# Patient Record
Sex: FEMALE | Race: BLACK OR AFRICAN AMERICAN | NOT HISPANIC OR LATINO | Employment: OTHER | URBAN - METROPOLITAN AREA
[De-identification: names, ages, dates, MRNs, and addresses within clinical notes are randomized per-mention and may not be internally consistent; named-entity substitution may affect disease eponyms.]

---

## 2020-03-28 ENCOUNTER — HOSPITAL ENCOUNTER (INPATIENT)
Facility: HOSPITAL | Age: 85
LOS: 2 days | Discharge: HOME | DRG: 189 | End: 2020-03-31
Attending: EMERGENCY MEDICINE | Admitting: EMERGENCY MEDICINE
Payer: MEDICARE

## 2020-03-28 DIAGNOSIS — R79.89 ELEVATED TROPONIN: ICD-10-CM

## 2020-03-28 DIAGNOSIS — R06.02 SHORTNESS OF BREATH: ICD-10-CM

## 2020-03-28 DIAGNOSIS — N17.9 AKI (ACUTE KIDNEY INJURY): ICD-10-CM

## 2020-03-28 DIAGNOSIS — J45.901 EXACERBATION OF ASTHMA, UNSPECIFIED ASTHMA SEVERITY, UNSPECIFIED WHETHER PERSISTENT: Primary | ICD-10-CM

## 2020-03-28 PROBLEM — I10 ESSENTIAL HYPERTENSION: Chronic | Status: ACTIVE | Noted: 2020-03-28

## 2020-03-28 PROBLEM — J96.01 ACUTE RESPIRATORY FAILURE WITH HYPOXIA: Status: ACTIVE | Noted: 2020-03-28

## 2020-03-28 LAB
ALBUMIN SERPL BCP-MCNC: 3.8 G/DL (ref 3.5–5.2)
ALP SERPL-CCNC: 59 U/L (ref 55–135)
ALT SERPL W/O P-5'-P-CCNC: 20 U/L (ref 10–44)
ANION GAP SERPL CALC-SCNC: 13 MMOL/L (ref 8–16)
AST SERPL-CCNC: 23 U/L (ref 10–40)
BACTERIA #/AREA URNS HPF: ABNORMAL /HPF
BASOPHILS # BLD AUTO: 0.03 K/UL (ref 0–0.2)
BASOPHILS NFR BLD: 0.2 % (ref 0–1.9)
BILIRUB SERPL-MCNC: 0.2 MG/DL (ref 0.1–1)
BILIRUB UR QL STRIP: NEGATIVE
BNP SERPL-MCNC: 36 PG/ML (ref 0–99)
BUN SERPL-MCNC: 25 MG/DL (ref 8–23)
CALCIUM SERPL-MCNC: 9.2 MG/DL (ref 8.7–10.5)
CHLORIDE SERPL-SCNC: 107 MMOL/L (ref 95–110)
CLARITY UR: CLEAR
CO2 SERPL-SCNC: 23 MMOL/L (ref 23–29)
COLOR UR: YELLOW
CREAT SERPL-MCNC: 1.6 MG/DL (ref 0.5–1.4)
CRP SERPL-MCNC: 40.3 MG/L (ref 0–8.2)
DIFFERENTIAL METHOD: ABNORMAL
EOSINOPHIL # BLD AUTO: 0.1 K/UL (ref 0–0.5)
EOSINOPHIL NFR BLD: 1 % (ref 0–8)
ERYTHROCYTE [DISTWIDTH] IN BLOOD BY AUTOMATED COUNT: 14.3 % (ref 11.5–14.5)
EST. GFR  (AFRICAN AMERICAN): 34 ML/MIN/1.73 M^2
EST. GFR  (NON AFRICAN AMERICAN): 30 ML/MIN/1.73 M^2
GLUCOSE SERPL-MCNC: 112 MG/DL (ref 70–110)
GLUCOSE UR QL STRIP: NEGATIVE
HCT VFR BLD AUTO: 35.1 % (ref 37–48.5)
HGB BLD-MCNC: 10.4 G/DL (ref 12–16)
HGB UR QL STRIP: ABNORMAL
HYALINE CASTS #/AREA URNS LPF: 15 /LPF
IMM GRANULOCYTES # BLD AUTO: 0.09 K/UL (ref 0–0.04)
IMM GRANULOCYTES NFR BLD AUTO: 0.7 % (ref 0–0.5)
INR PPP: 0.9 (ref 0.8–1.2)
KETONES UR QL STRIP: NEGATIVE
LEUKOCYTE ESTERASE UR QL STRIP: NEGATIVE
LYMPHOCYTES # BLD AUTO: 0.8 K/UL (ref 1–4.8)
LYMPHOCYTES NFR BLD: 6 % (ref 18–48)
MCH RBC QN AUTO: 27.1 PG (ref 27–31)
MCHC RBC AUTO-ENTMCNC: 29.6 G/DL (ref 32–36)
MCV RBC AUTO: 91 FL (ref 82–98)
MICROSCOPIC COMMENT: ABNORMAL
MONOCYTES # BLD AUTO: 0.8 K/UL (ref 0.3–1)
MONOCYTES NFR BLD: 5.6 % (ref 4–15)
NEUTROPHILS # BLD AUTO: 11.9 K/UL (ref 1.8–7.7)
NEUTROPHILS NFR BLD: 86.5 % (ref 38–73)
NITRITE UR QL STRIP: NEGATIVE
NRBC BLD-RTO: 0 /100 WBC
PH UR STRIP: 5 [PH] (ref 5–8)
PLATELET # BLD AUTO: 286 K/UL (ref 150–350)
PMV BLD AUTO: 10 FL (ref 9.2–12.9)
POTASSIUM SERPL-SCNC: 4.5 MMOL/L (ref 3.5–5.1)
PROCALCITONIN SERPL IA-MCNC: 0.05 NG/ML
PROT SERPL-MCNC: 7.4 G/DL (ref 6–8.4)
PROT UR QL STRIP: ABNORMAL
PROTHROMBIN TIME: 10.2 SEC (ref 9–12.5)
RBC # BLD AUTO: 3.84 M/UL (ref 4–5.4)
RBC #/AREA URNS HPF: 1 /HPF (ref 0–4)
SODIUM SERPL-SCNC: 143 MMOL/L (ref 136–145)
SP GR UR STRIP: 1.01 (ref 1–1.03)
TROPONIN I SERPL DL<=0.01 NG/ML-MCNC: 0.06 NG/ML (ref 0–0.03)
URN SPEC COLLECT METH UR: ABNORMAL
UROBILINOGEN UR STRIP-ACNC: NEGATIVE EU/DL
WBC # BLD AUTO: 13.7 K/UL (ref 3.9–12.7)
WBC #/AREA URNS HPF: 5 /HPF (ref 0–5)

## 2020-03-28 PROCEDURE — 86140 C-REACTIVE PROTEIN: CPT

## 2020-03-28 PROCEDURE — 93010 ELECTROCARDIOGRAM REPORT: CPT | Mod: ,,, | Performed by: INTERNAL MEDICINE

## 2020-03-28 PROCEDURE — 63600175 PHARM REV CODE 636 W HCPCS: Performed by: EMERGENCY MEDICINE

## 2020-03-28 PROCEDURE — 82728 ASSAY OF FERRITIN: CPT

## 2020-03-28 PROCEDURE — 83615 LACTATE (LD) (LDH) ENZYME: CPT

## 2020-03-28 PROCEDURE — 85610 PROTHROMBIN TIME: CPT

## 2020-03-28 PROCEDURE — 83880 ASSAY OF NATRIURETIC PEPTIDE: CPT

## 2020-03-28 PROCEDURE — 99285 EMERGENCY DEPT VISIT HI MDM: CPT | Mod: 25

## 2020-03-28 PROCEDURE — 83605 ASSAY OF LACTIC ACID: CPT

## 2020-03-28 PROCEDURE — 96374 THER/PROPH/DIAG INJ IV PUSH: CPT

## 2020-03-28 PROCEDURE — 93005 ELECTROCARDIOGRAM TRACING: CPT

## 2020-03-28 PROCEDURE — 93010 EKG 12-LEAD: ICD-10-PCS | Mod: ,,, | Performed by: INTERNAL MEDICINE

## 2020-03-28 PROCEDURE — 80053 COMPREHEN METABOLIC PANEL: CPT

## 2020-03-28 PROCEDURE — 84145 PROCALCITONIN (PCT): CPT

## 2020-03-28 PROCEDURE — 84484 ASSAY OF TROPONIN QUANT: CPT

## 2020-03-28 PROCEDURE — 82550 ASSAY OF CK (CPK): CPT

## 2020-03-28 PROCEDURE — 85025 COMPLETE CBC W/AUTO DIFF WBC: CPT

## 2020-03-28 PROCEDURE — 85652 RBC SED RATE AUTOMATED: CPT

## 2020-03-28 PROCEDURE — 81000 URINALYSIS NONAUTO W/SCOPE: CPT

## 2020-03-28 RX ORDER — AMLODIPINE AND BENAZEPRIL HYDROCHLORIDE 10; 40 MG/1; MG/1
1 CAPSULE ORAL DAILY
COMMUNITY

## 2020-03-28 RX ORDER — HYDRALAZINE HYDROCHLORIDE 20 MG/ML
5 INJECTION INTRAMUSCULAR; INTRAVENOUS
Status: COMPLETED | OUTPATIENT
Start: 2020-03-28 | End: 2020-03-28

## 2020-03-28 RX ORDER — ATORVASTATIN CALCIUM 40 MG/1
40 TABLET, FILM COATED ORAL DAILY
COMMUNITY

## 2020-03-28 RX ADMIN — HYDRALAZINE HYDROCHLORIDE 5 MG: 20 INJECTION INTRAMUSCULAR; INTRAVENOUS at 10:03

## 2020-03-29 PROBLEM — J45.901 EXACERBATION OF ASTHMA: Status: ACTIVE | Noted: 2020-03-29

## 2020-03-29 LAB
CK SERPL-CCNC: 87 U/L (ref 20–180)
ERYTHROCYTE [SEDIMENTATION RATE] IN BLOOD BY WESTERGREN METHOD: 36 MM/HR (ref 0–20)
FERRITIN SERPL-MCNC: 22 NG/ML (ref 20–300)
LACTATE SERPL-SCNC: 0.7 MMOL/L (ref 0.5–2.2)
LDH SERPL L TO P-CCNC: 244 U/L (ref 110–260)
TROPONIN I SERPL DL<=0.01 NG/ML-MCNC: 0.02 NG/ML (ref 0–0.03)

## 2020-03-29 PROCEDURE — 84484 ASSAY OF TROPONIN QUANT: CPT

## 2020-03-29 PROCEDURE — 25000003 PHARM REV CODE 250: Performed by: EMERGENCY MEDICINE

## 2020-03-29 PROCEDURE — 63600175 PHARM REV CODE 636 W HCPCS: Performed by: HOSPITALIST

## 2020-03-29 PROCEDURE — U0002 COVID-19 LAB TEST NON-CDC: HCPCS

## 2020-03-29 PROCEDURE — 21400001 HC TELEMETRY ROOM

## 2020-03-29 PROCEDURE — 63600175 PHARM REV CODE 636 W HCPCS: Performed by: EMERGENCY MEDICINE

## 2020-03-29 PROCEDURE — 36415 COLL VENOUS BLD VENIPUNCTURE: CPT

## 2020-03-29 RX ORDER — SODIUM CHLORIDE 0.9 % (FLUSH) 0.9 %
10 SYRINGE (ML) INJECTION
Status: DISCONTINUED | OUTPATIENT
Start: 2020-03-29 | End: 2020-03-31 | Stop reason: HOSPADM

## 2020-03-29 RX ORDER — SODIUM CHLORIDE 9 MG/ML
INJECTION, SOLUTION INTRAVENOUS CONTINUOUS
Status: DISCONTINUED | OUTPATIENT
Start: 2020-03-29 | End: 2020-03-30

## 2020-03-29 RX ORDER — ONDANSETRON 2 MG/ML
4 INJECTION INTRAMUSCULAR; INTRAVENOUS EVERY 8 HOURS PRN
Status: DISCONTINUED | OUTPATIENT
Start: 2020-03-29 | End: 2020-03-31 | Stop reason: HOSPADM

## 2020-03-29 RX ORDER — ACETAMINOPHEN 325 MG/1
650 TABLET ORAL EVERY 8 HOURS PRN
Status: DISCONTINUED | OUTPATIENT
Start: 2020-03-29 | End: 2020-03-31 | Stop reason: HOSPADM

## 2020-03-29 RX ORDER — PANTOPRAZOLE SODIUM 40 MG/1
40 TABLET, DELAYED RELEASE ORAL DAILY
Status: DISCONTINUED | OUTPATIENT
Start: 2020-03-29 | End: 2020-03-31 | Stop reason: HOSPADM

## 2020-03-29 RX ORDER — PREDNISONE 20 MG/1
20 TABLET ORAL DAILY
Status: DISCONTINUED | OUTPATIENT
Start: 2020-03-29 | End: 2020-03-31 | Stop reason: HOSPADM

## 2020-03-29 RX ORDER — AZITHROMYCIN 250 MG/1
500 TABLET, FILM COATED ORAL DAILY
Status: DISCONTINUED | OUTPATIENT
Start: 2020-03-29 | End: 2020-03-31 | Stop reason: HOSPADM

## 2020-03-29 RX ORDER — HYDRALAZINE HYDROCHLORIDE 20 MG/ML
10 INJECTION INTRAMUSCULAR; INTRAVENOUS EVERY 6 HOURS PRN
Status: DISCONTINUED | OUTPATIENT
Start: 2020-03-29 | End: 2020-03-31 | Stop reason: HOSPADM

## 2020-03-29 RX ORDER — ALBUTEROL SULFATE 90 UG/1
2 AEROSOL, METERED RESPIRATORY (INHALATION) EVERY 4 HOURS PRN
Status: DISCONTINUED | OUTPATIENT
Start: 2020-03-29 | End: 2020-03-31 | Stop reason: HOSPADM

## 2020-03-29 RX ORDER — PREDNISONE 20 MG/1
20 TABLET ORAL
Status: COMPLETED | OUTPATIENT
Start: 2020-03-29 | End: 2020-03-29

## 2020-03-29 RX ADMIN — AZITHROMYCIN 500 MG: 250 TABLET, FILM COATED ORAL at 11:03

## 2020-03-29 RX ADMIN — HYDRALAZINE HYDROCHLORIDE 10 MG: 20 INJECTION INTRAMUSCULAR; INTRAVENOUS at 07:03

## 2020-03-29 RX ADMIN — CEFTRIAXONE 1 G: 1 INJECTION, POWDER, FOR SOLUTION INTRAMUSCULAR; INTRAVENOUS at 12:03

## 2020-03-29 RX ADMIN — PANTOPRAZOLE SODIUM 40 MG: 40 TABLET, DELAYED RELEASE ORAL at 11:03

## 2020-03-29 RX ADMIN — PREDNISONE 20 MG: 20 TABLET ORAL at 11:03

## 2020-03-29 RX ADMIN — SODIUM CHLORIDE: 0.9 INJECTION, SOLUTION INTRAVENOUS at 11:03

## 2020-03-29 RX ADMIN — PREDNISONE 20 MG: 20 TABLET ORAL at 12:03

## 2020-03-29 RX ADMIN — SODIUM CHLORIDE: 0.9 INJECTION, SOLUTION INTRAVENOUS at 12:03

## 2020-03-29 RX ADMIN — CEFTRIAXONE 1 G: 1 INJECTION, POWDER, FOR SOLUTION INTRAMUSCULAR; INTRAVENOUS at 11:03

## 2020-03-29 RX ADMIN — AZITHROMYCIN 500 MG: 250 TABLET, FILM COATED ORAL at 12:03

## 2020-03-29 RX ADMIN — ACETAMINOPHEN 650 MG: 325 TABLET ORAL at 10:03

## 2020-03-29 NOTE — HPI
85 y.o. female with asthma, HTN, and former smoker presents with a complaint of SOB.  Acute onset tonight, associated with wheezing and chest tightness.  Attempted self treatment with albuterol MDI without relief.  Similar occurrence 2/23/2020 for which she was hospitalized at [facility], no intubation, treated with prednisone.  Denies fever, chills, cough, chest pain, orthopnea, PND, dizziness, syncope, n/v/d, abd pain, bloody or black stools, dysuria, frequency, or urgency.  In the ED, she was found to be hypoxic on room air.  CXR without acute abnormality.  Labs reveal ROD, mildly elevated troponin, lymphopenia, and elevated CRP.  UA unremarkable.  procalcitonin normal.  Admitted to Hospital Medicine for further evaluation and treatment.

## 2020-03-29 NOTE — ASSESSMENT & PLAN NOTE
Greater than 50% drop in GFR, Cr doubled.  Continue IV fluids, strict I/O's, monitor renal function and electrolytes, avoid nephrotoxic agents.

## 2020-03-29 NOTE — ED NOTES
LOC: The patient is awake, alert and aware of environment with an appropriate affect, the patient is oriented x 3 and speaking appropriately.  APPEARANCE: Patient resting comfortably and in no acute distress, patient is clean and well groomed, patient's clothing is properly fastened.  SKIN: The skin is warm and dry, color consistent with ethnicity, patient has normal skin turgor and moist mucus membranes, skin intact, no breakdown or bruising noted to visible skin.  MUSCULOSKELETAL: Patient moving all extremities spontaneously, no obvious swelling or deformities noted.  RESPIRATORY: Airway is open and patent, respirations are spontaneous, patient has a mild increase effort and normal rate, no accessory muscle use noted, bilateral breath sounds deminished and occasional wheeze throughout to auscultation. Patient reports no current shortness of breath, no distress noted. On 2L NC  CARDIAC: Patient has a normal rate and regular rhythm, no periphreal edema noted, capillary refill < 3 seconds.  ABDOMEN: Soft and non tender to palpation, no distention noted, normoactive bowel sounds present in all four quadrants.  NEUROLOGIC: PERRL, 3 mm bilaterally, eyes open spontaneously, behavior appropriate to situation, follows commands, facial expression symmetrical, bilateral hand grasp equal and even, purposeful motor response noted, normal sensation in all extremities when touched with a finger.  PSCYH: Brief WNL

## 2020-03-29 NOTE — PLAN OF CARE
03/29/20 1616   Discharge Assessment   Assessment Type Discharge Planning Assessment   Confirmed/corrected address and phone number on facesheet? Yes   Assessment information obtained from? Patient   Communicated expected length of stay with patient/caregiver no   Prior to hospitilization cognitive status: Alert/Oriented   Prior to hospitalization functional status: Independent;Assistive Equipment;Needs Assistance  (Nebulizer; walker; friends assist and transport if needed)   Current cognitive status: Alert/Oriented   Current Functional Status: Independent;Assistive Equipment;Needs Assistance  (Nebulizer, walker; friends assist and transport)   Facility Arrived From: Home   Lives With friend(s)   Able to Return to Prior Arrangements yes   Is patient able to care for self after discharge? Yes   Who are your caregiver(s) and their phone number(s)? Iris-friend: [phone]   Patient's perception of discharge disposition home or selfcare   Readmission Within the Last 30 Days no previous admission in last 30 days   Patient currently being followed by outpatient case management? No   Patient currently receives any other outside agency services? No   Equipment Currently Used at Home nebulizer;walker, rolling   Do you have any problems affording any of your prescribed medications? TBD  (No insurance listed)   Is the patient taking medications as prescribed? yes   Does the patient have transportation home? Yes   Transportation Anticipated family or friend will provide   Does the patient receive services at the [facility]? No   Discharge Plan A Home with family   Discharge Plan B Other  (TBD)   DME Needed Upon Discharge  other (see comments)  (TBD)   Patient/Family in Agreement with Plan yes   SW Role explained to patient; two patient identifiers recognized; SW contact information placed on Communication board. Discussed patient managing health care at home; determined who would be helping patient at home with recovery:  Lives with friend who helps; friends transport    PCP: [facility]    Extended Emergency Contact Information  Primary Emergency Contact: Misty Melvin  Address: [address] [place] [id number] [facility]  Home Phone: [phone]  Relation: Friend     Pam [address] GARCIA    Payor: /  None listed

## 2020-03-29 NOTE — ED NOTES
Patient resting, eyes closed. Appears alseep and resting comfortable. No distress. Vitals remained stable and uneventful overnight after improvemetn of BP with hydralizine eariler in shift.

## 2020-03-29 NOTE — ED NOTES
The patient is resting quietly, eyes closed, arouses easily to stimuli. Updated on plan of care. Medications as ordered. No complaints at this time.  Airway is open and patent, respirations are spontaneous, normal respiratory effort and rate noted, skin warm and dry, appearance: in no acute distress and resting comfortably.

## 2020-03-29 NOTE — ED PROVIDER NOTES
Encounter Date: 3/28/2020    SCRIBE #1 NOTE: I, Herman Miller, am scribing for, and in the presence of,  Jevon Conner MD. I have scribed the following portions of the note - Other sections scribed: HPI, ROS, PE.       History     Chief Complaint   Patient presents with    Shortness of Breath     pt c/o SOB that started all of a sudden tonight; hx of asthma; states this feels like an asthma attack; denies cough or fever    Hypoxia     pt is 88% on RA; received 1 breathing tx with EMS     This is a 85 y.o. female who presents to the ED via EMS with c/o sudden onset SOB that began earlier this evening. Symptoms are constant and severe in severity. The pt notes the current symptoms are consistent with her usual Asthma symptoms. She used her Albuterol pump at home with no relief. Upon EMS arrival the pt was given a duoneb treatment with noticeable improvement. No mitigating or exacerbating factors reported. There are no associated symptoms. Patient denies any known sick contact. Denies any fever, cough, CP, myalgias, and all other sxs at this time.    PMHx of Asthma.      The history is provided by the patient and medical records.     Review of patient's allergies indicates:  No Known Allergies  Past Medical History:   Diagnosis Date    Asthma     Hypertension      Past Surgical History:   Procedure Laterality Date    HYSTERECTOMY       Family History   Problem Relation Age of Onset    Hypertension Mother     Stroke Father      Social History     Tobacco Use    Smoking status: Former Smoker     Types: Cigarettes     Last attempt to quit: 3/28/1995     Years since quittin.0    Smokeless tobacco: Never Used   Substance Use Topics    Alcohol use: Yes     Frequency: Never     Comment: occasional/social    Drug use: Never     Review of Systems   Constitutional: Negative for chills, diaphoresis and fever.   HENT: Negative for congestion and sore throat.    Eyes: Negative for visual disturbance.   Respiratory:  Positive for shortness of breath. Negative for cough.    Cardiovascular: Negative for chest pain.   Gastrointestinal: Negative for abdominal pain, blood in stool, diarrhea, nausea and vomiting.        No melena.   Genitourinary: Negative for dysuria, flank pain and hematuria.   Skin: Negative for rash and wound.   Neurological: Negative for dizziness, speech difficulty, weakness, numbness and headaches.   Psychiatric/Behavioral: Negative for confusion.       Physical Exam     Initial Vitals [03/28/20 2023]   BP Pulse Resp Temp SpO2   (!) 186/104 106 (!) 30 98.4 °F (36.9 °C) (S) (!) 88 %      MAP       --         Physical Exam    Nursing note and vitals reviewed.  Constitutional: She is not diaphoretic. No distress.   HENT:   Head: Normocephalic and atraumatic.   Mouth/Throat: Oropharynx is clear and moist.   Eyes: EOM are normal. Pupils are equal, round, and reactive to light. No scleral icterus.   Neck: Normal range of motion. Neck supple. No JVD present.   Cardiovascular: Normal rate, regular rhythm, normal heart sounds and intact distal pulses.   Pulmonary/Chest: Breath sounds normal. No stridor. No respiratory distress. She has no wheezes. She has no rhonchi. She has no rales. She exhibits no tenderness.   Pt is speaking in full sentences.   Abdominal: Soft. Bowel sounds are normal. She exhibits no distension. There is no tenderness.   Musculoskeletal: Normal range of motion. She exhibits no edema or tenderness.   Neurological: She is alert. She has normal strength. No cranial nerve deficit or sensory deficit. GCS score is 15. GCS eye subscore is 4. GCS verbal subscore is 5. GCS motor subscore is 6.   Skin: Skin is warm and dry.   Psychiatric: She has a normal mood and affect.         ED Course   Procedures  Labs Reviewed   CBC W/ AUTO DIFFERENTIAL - Abnormal; Notable for the following components:       Result Value    WBC 13.70 (*)     RBC 3.84 (*)     Hemoglobin 10.4 (*)     Hematocrit 35.1 (*)     Mean  Corpuscular Hemoglobin Conc 29.6 (*)     Immature Granulocytes 0.7 (*)     Gran # (ANC) 11.9 (*)     Immature Grans (Abs) 0.09 (*)     Lymph # 0.8 (*)     Gran% 86.5 (*)     Lymph% 6.0 (*)     All other components within normal limits   COMPREHENSIVE METABOLIC PANEL - Abnormal; Notable for the following components:    Glucose 112 (*)     BUN, Bld 25 (*)     Creatinine 1.6 (*)     eGFR if  34 (*)     eGFR if non  30 (*)     All other components within normal limits   TROPONIN I - Abnormal; Notable for the following components:    Troponin I 0.058 (*)     All other components within normal limits   C-REACTIVE PROTEIN - Abnormal; Notable for the following components:    CRP 40.3 (*)     All other components within normal limits   URINALYSIS, REFLEX TO URINE CULTURE - Abnormal; Notable for the following components:    Protein, UA 1+ (*)     Occult Blood UA 1+ (*)     All other components within normal limits    Narrative:     Preferred Collection Type->Urine, Clean Catch   URINALYSIS MICROSCOPIC - Abnormal; Notable for the following components:    Hyaline Casts, UA 15 (*)     All other components within normal limits    Narrative:     Preferred Collection Type->Urine, Clean Catch   SEDIMENTATION RATE - Abnormal; Notable for the following components:    Sed Rate 36 (*)     All other components within normal limits    Narrative:     Suspect COVID 19   B-TYPE NATRIURETIC PEPTIDE   PROTIME-INR   PROCALCITONIN   FERRITIN    Narrative:     Suspect COVID 19   LACTATE DEHYDROGENASE    Narrative:     Suspect COVID 19   CK    Narrative:     Suspect COVID 19   LACTIC ACID, PLASMA    Narrative:     Suspect COVID 19     EKG Readings: (Independently Interpreted)   Initial Reading: No STEMI. Rhythm: Normal Sinus Rhythm. Heart Rate: 88 BPM. Ectopy: No Ectopy. Conduction: Normal. ST Segments: Normal ST Segments. T Waves: Normal. Clinical Impression: Normal Sinus Rhythm       Imaging Results          X-Ray  Chest AP Portable (Final result)  Result time 03/28/20 21:49:02    Final result by Mark Bustillo MD (03/28/20 21:49:02)                 Impression:      No acute cardiopulmonary process identified.      Electronically signed by: Mark Bustillo MD  Date:    03/28/2020  Time:    21:49             Narrative:    EXAMINATION:  XR CHEST AP PORTABLE    CLINICAL HISTORY:  SOB;    TECHNIQUE:  Single frontal view of the chest was performed.    COMPARISON:  None    FINDINGS:  Cardiac silhouette appears upper limits of normal in size, noting magnification on this single AP portable view.  Lungs are symmetrically expanded.  No evidence of focal consolidative process, pneumothorax, or significant effusion.  No acute osseous abnormality identified.                                 Medical Decision Making:   History:   Old Medical Records: I decided to obtain old medical records.  Differential Diagnosis:   Asthma, COPD,URI, PNA, CHF, ACS            Scribe Attestation:   Scribe #1: I performed the above scribed service and the documentation accurately describes the services I performed. I attest to the accuracy of the note.            ED Course as of Mar 29 0252   Sat Mar 28, 2020   2122 Patient is afebrile and in no acute distress at time of history and physical.  She denies fever, body aches, sick contacts or cough.  She reports her symptoms are consistent with her you for asthma exacerbation.  Patient reports improvement in his symptoms after DuoNeb given by EMS she does not have wheezing on auscultation time assessment.  She is saturating 100% on 2 L nasal cannula.  Due to concern steroid use in the setting of high local COVID-19 prevalent and patient without wheezing will withhold steroids at this time.  Will obtain lab testing imaging to further evaluate.    [SG]   2250 LabsWith leukocytosis and mild granulocytosis.  Unclear if this is related to infectious source or to demand.  Patient has ROD.  Unclear if this is related  to her hypertension or to CKD.  Troponin is elevated at 0.058.  Patient denies chest pain.  Unclear elevated troponin is related to renal insufficiency or demand related patient's hypertension.  Patient has mild lymphopenia, normal procalcitonin, CRP elevated at 40.3.  Chest x-ray without obvious infiltrate.  Given patient's ROD elevated troponin he is to be placed in observation for further evaluation.  Case discussed with hospital medicine PA recommends COVID-19 testing as well as isolation.  Recommends low does steroids and albuterol MDI treatment.     [SG]      ED Course User Index  [SG] Jevon Conner MD     After further discussion with utilization management patient to advise for inpatient admission due to acute kidney injury to as well as few symptom admitted           Clinical Impression:       ICD-10-CM ICD-9-CM   1. Exacerbation of asthma, unspecified asthma severity, unspecified whether persistent J45.901 493.92   2. Shortness of breath R06.02 786.05   3. ROD (acute kidney injury) N17.9 584.9   4. Elevated troponin R79.89 790.6         Disposition:   Disposition: Admitted  Condition: Fair     ED Disposition Condition    Admit            I, Jevon Conner , personally performed the services described in this documentation. All medical record entries made by the scribe were at my direction and in my presence.  I have reviewed the chart and agree that the record reflects my personal performance and is accurate and complete                    Jevon Conner MD  03/29/20 0254

## 2020-03-29 NOTE — ASSESSMENT & PLAN NOTE
Hypoxic on room air requiring supplemental oxygen, likely asthma exacerbation.  No fever, cough, or other associated symptoms. CRP elevated, lymphopenia, normal procal, and mildly elevated trop somewhat concerning.  Placed in isolation.

## 2020-03-29 NOTE — NURSING
"Admission to Telemetry from ED:  Patient assigned to Airborne/Driolet isolation.   Note that patient awake, alert and fully oriented;  C/O "off and on SOB & my asthma."  On 3L NC with SPO2@98%   BP: elevated at 186/88.  Denies pain at this time.     Oriented to room;  Will review orders.  All safety measures maintained.   Telemetry box 2669 applied.   Will continue to f/u.     "

## 2020-03-29 NOTE — ED TRIAGE NOTES
Patient with asthma hx and exacerbation not releived by at home albuterol inhaler. Patient called EMS. Was given additonal neb inroute with little improvement. Patient denies fever or feeling bad. Reports has taken bp medication today.

## 2020-03-29 NOTE — SUBJECTIVE & OBJECTIVE
Past Medical History:   Diagnosis Date    Asthma     Hypertension        Past Surgical History:   Procedure Laterality Date    HYSTERECTOMY         Review of patient's allergies indicates:  No Known Allergies    No current facility-administered medications on file prior to encounter.      Current Outpatient Medications on File Prior to Encounter   Medication Sig    amLODIPine-benazepriL (LOTREL) 10-40 mg per capsule Take 1 capsule by mouth once daily.    atorvastatin (LIPITOR) 40 MG tablet Take 40 mg by mouth once daily.     Family History     Problem Relation (Age of Onset)    Hypertension Mother    Stroke Father        Tobacco Use    Smoking status: Former Smoker     Types: Cigarettes     Last attempt to quit: 3/28/1995     Years since quittin.0    Smokeless tobacco: Never Used   Substance and Sexual Activity    Alcohol use: Yes     Frequency: Never     Comment: occasional/social    Drug use: Never    Sexual activity: Not Currently     Review of Systems   Constitutional: Negative for chills, fatigue and fever.   Eyes: Negative for photophobia and visual disturbance.   Respiratory: Positive for chest tightness, shortness of breath and wheezing. Negative for cough.    Cardiovascular: Negative for chest pain, palpitations and leg swelling.   Gastrointestinal: Negative for abdominal pain, diarrhea, nausea and vomiting.   Genitourinary: Negative for dysuria, frequency and urgency.   Skin: Negative for pallor, rash and wound.   Neurological: Negative for light-headedness and headaches.   Psychiatric/Behavioral: Negative for confusion and decreased concentration.     Objective:     Vital Signs (Most Recent):  Temp: 98.1 °F (36.7 °C) (20)  Pulse: 82 (20)  Resp: (!) 29 (20)  BP: (!) 189/90 (20)  SpO2: 96 % (20) Vital Signs (24h Range):  Temp:  [98.1 °F (36.7 °C)-98.4 °F (36.9 °C)] 98.1 °F (36.7 °C)  Pulse:  [] 82  Resp:  [19-30] 29  SpO2:  [88  %-100 %] 96 %  BP: (186-190)/() 189/90     Weight: 54.4 kg (120 lb)  Body mass index is 23.44 kg/m².    Physical Exam   Constitutional: She is oriented to person, place, and time. She appears well-developed and well-nourished. No distress.   HENT:   Head: Normocephalic and atraumatic.   Right Ear: External ear normal.   Left Ear: External ear normal.   Nose: Nose normal.   Mouth/Throat: Oropharynx is clear and moist.   Eyes: Pupils are equal, round, and reactive to light. Conjunctivae and EOM are normal.   Neck: Normal range of motion. Neck supple.   Cardiovascular: Normal rate, regular rhythm and intact distal pulses.   Pulmonary/Chest: Effort normal. No respiratory distress. She has decreased breath sounds. She has no wheezes.   Abdominal: Soft. Bowel sounds are normal. She exhibits no distension. There is no tenderness.   No palpable hepatomegaly or splenomegaly    Musculoskeletal: Normal range of motion. She exhibits no edema or tenderness.   Neurological: She is alert and oriented to person, place, and time.   Skin: Skin is warm and dry.   Psychiatric: She has a normal mood and affect. Thought content normal.   Nursing note and vitals reviewed.        CRANIAL NERVES     CN III, IV, VI   Pupils are equal, round, and reactive to light.  Extraocular motions are normal.        Significant Labs: All pertinent labs within the past 24 hours have been reviewed.    Significant Imaging: I have reviewed all pertinent imaging results/findings within the past 24 hours.

## 2020-03-29 NOTE — ED NOTES
LOC: The patient is awake, alert and aware of environment with an appropriate affect, the patient is oriented x 3 and speaking appropriately.  APPEARANCE: Patient resting comfortably and in no acute distress, patient is clean and well groomed, patient's clothing is properly fastened.  SKIN: The skin is warm and dry, color consistent with ethnicity, patient has normal skin turgor and moist mucus membranes, skin intact, no breakdown or bruising noted to visible skin.  MUSCULOSKELETAL: Patient moving all extremities spontaneously, no obvious swelling or deformities noted.  RESPIRATORY: Airway is open and patent, respirations are spontaneous, patient has a normal effort and normal rate, no accessory muscle use noted, bilateral breath sounds deminished and occasional wheeze throughout to auscultation. Patient reports no current shortness of breath, no distress noted. On 1L NC  CARDIAC: Patient has a normal rate and regular rhythm, no periphreal edema noted, capillary refill < 3 seconds.  ABDOMEN: Soft and non tender to palpation, no distention noted, normoactive bowel sounds present in all four quadrants.  NEUROLOGIC: PERRL, 3 mm bilaterally, eyes open spontaneously, behavior appropriate to situation, follows commands, facial expression symmetrical, bilateral hand grasp equal and even, purposeful motor response noted, normal sensation in all extremities when touched with a finger.  PSCYH: Brief WNL

## 2020-03-29 NOTE — H&P
[facility] Hospital Medicine  History & Physical    Patient Name: Gloria Garcia  MRN: [id number]  Admission Date: 3/28/2020  Attending Physician: Jevon Conner MD   Primary Care Provider: No primary care provider on file.         Patient information was obtained from patient, past medical records and ER records.     Subjective:     Principal Problem:Acute respiratory failure with hypoxia    Chief Complaint:   Chief Complaint   Patient presents with    Shortness of Breath     pt c/o SOB that started all of a sudden tonight; hx of asthma; states this feels like an asthma attack; denies cough or fever    Hypoxia     pt is 88% on RA; received 1 breathing tx with EMS        HPI: 85 y.o. female with asthma, HTN, and former smoker presents with a complaint of SOB.  Acute onset tonight, associated with wheezing and chest tightness.  Attempted self treatment with albuterol MDI without relief.  Similar occurrence 2/23/2020 for which she was hospitalized at [facility], no intubation, treated with prednisone.  Denies fever, chills, cough, chest pain, orthopnea, PND, dizziness, syncope, n/v/d, abd pain, bloody or black stools, dysuria, frequency, or urgency.  In the ED, she was found to be hypoxic on room air.  CXR without acute abnormality.  Labs reveal ROD, mildly elevated troponin, lymphopenia, and elevated CRP.  UA unremarkable.  procalcitonin normal.  Admitted to Hospital Medicine for further evaluation and treatment.    Past Medical History:   Diagnosis Date    Asthma     Hypertension        Past Surgical History:   Procedure Laterality Date    HYSTERECTOMY         Review of patient's allergies indicates:  No Known Allergies    No current facility-administered medications on file prior to encounter.      Current Outpatient Medications on File Prior to Encounter   Medication Sig    amLODIPine-benazepriL (LOTREL) 10-40 mg per capsule Take 1 capsule by mouth once daily.    atorvastatin (LIPITOR) 40 MG  tablet Take 40 mg by mouth once daily.     Family History     Problem Relation (Age of Onset)    Hypertension Mother    Stroke Father        Tobacco Use    Smoking status: Former Smoker     Types: Cigarettes     Last attempt to quit: 3/28/1995     Years since quittin.0    Smokeless tobacco: Never Used   Substance and Sexual Activity    Alcohol use: Yes     Frequency: Never     Comment: occasional/social    Drug use: Never    Sexual activity: Not Currently     Review of Systems   Constitutional: Negative for chills, fatigue and fever.   Eyes: Negative for photophobia and visual disturbance.   Respiratory: Positive for chest tightness, shortness of breath and wheezing. Negative for cough.    Cardiovascular: Negative for chest pain, palpitations and leg swelling.   Gastrointestinal: Negative for abdominal pain, diarrhea, nausea and vomiting.   Genitourinary: Negative for dysuria, frequency and urgency.   Skin: Negative for pallor, rash and wound.   Neurological: Negative for light-headedness and headaches.   Psychiatric/Behavioral: Negative for confusion and decreased concentration.     Objective:     Vital Signs (Most Recent):  Temp: 98.1 °F (36.7 °C) (20)  Pulse: 82 (20)  Resp: (!) 29 (20)  BP: (!) 189/90 (20)  SpO2: 96 % (20) Vital Signs (24h Range):  Temp:  [98.1 °F (36.7 °C)-98.4 °F (36.9 °C)] 98.1 °F (36.7 °C)  Pulse:  [] 82  Resp:  [19-30] 29  SpO2:  [88 %-100 %] 96 %  BP: (186-190)/() 189/90     Weight: 54.4 kg (120 lb)  Body mass index is 23.44 kg/m².    Physical Exam   Constitutional: She is oriented to person, place, and time. She appears well-developed and well-nourished. No distress.   HENT:   Head: Normocephalic and atraumatic.   Right Ear: External ear normal.   Left Ear: External ear normal.   Nose: Nose normal.   Mouth/Throat: Oropharynx is clear and moist.   Eyes: Pupils are equal, round, and reactive to light. Conjunctivae  and EOM are normal.   Neck: Normal range of motion. Neck supple.   Cardiovascular: Normal rate, regular rhythm and intact distal pulses.   Pulmonary/Chest: Effort normal. No respiratory distress. She has decreased breath sounds. She has no wheezes.   Abdominal: Soft. Bowel sounds are normal. She exhibits no distension. There is no tenderness.   No palpable hepatomegaly or splenomegaly    Musculoskeletal: Normal range of motion. She exhibits no edema or tenderness.   Neurological: She is alert and oriented to person, place, and time.   Skin: Skin is warm and dry.   Psychiatric: She has a normal mood and affect. Thought content normal.   Nursing note and vitals reviewed.        CRANIAL NERVES     CN III, IV, VI   Pupils are equal, round, and reactive to light.  Extraocular motions are normal.        Significant Labs: All pertinent labs within the past 24 hours have been reviewed.    Significant Imaging: I have reviewed all pertinent imaging results/findings within the past 24 hours.    Assessment/Plan:     * Acute respiratory failure with hypoxia  Hypoxic on room air requiring supplemental oxygen, likely asthma exacerbation.  No fever, cough, or other associated symptoms. CRP elevated, lymphopenia, normal procal, and mildly elevated trop somewhat concerning.  Placed in isolation.    ROD (acute kidney injury)  Greater than 50% drop in GFR, Cr doubled.  Continue IV fluids, strict I/O's, monitor renal function and electrolytes, avoid nephrotoxic agents.     Moderate asthma with acute exacerbation  Complaint of SOB and wheezing, somewhat improved with nebs given by EMS, continue albuterol MDI and low dose prednisone.    Essential hypertension  Poorly controlled, continue home medications and monitor blood pressure, adjust as needed.       VTE Risk Mitigation (From admission, onward)    None        Ciro Meyers Jr., APRN, AGACN-BC  Hospitalist - Department of Hospital Medicine  [facility]  [address] [place] [address]  Office #: [phone]; Pager #: [phone]

## 2020-03-29 NOTE — ED NOTES
The patient is resting quietly, eyes closed, arouses easily to stimuli. Patient with no complaitns at this time. BP improvied.  Airway is open and patent, respirations are spontaneous, normal respiratory effort and rate noted, skin warm and dry, appearance: in no acute distress and resting comfortably. Awaiting dispo.

## 2020-03-29 NOTE — ASSESSMENT & PLAN NOTE
Complaint of SOB and wheezing, somewhat improved with nebs given by EMS, continue albuterol MDI and low dose prednisone.

## 2020-03-30 LAB
ALBUMIN SERPL BCP-MCNC: 3.3 G/DL (ref 3.5–5.2)
ALP SERPL-CCNC: 60 U/L (ref 55–135)
ALT SERPL W/O P-5'-P-CCNC: 19 U/L (ref 10–44)
ANION GAP SERPL CALC-SCNC: 8 MMOL/L (ref 8–16)
AST SERPL-CCNC: 18 U/L (ref 10–40)
BASOPHILS # BLD AUTO: 0.02 K/UL (ref 0–0.2)
BASOPHILS NFR BLD: 0.2 % (ref 0–1.9)
BILIRUB SERPL-MCNC: 0.5 MG/DL (ref 0.1–1)
BUN SERPL-MCNC: 15 MG/DL (ref 8–23)
CALCIUM SERPL-MCNC: 8.6 MG/DL (ref 8.7–10.5)
CHLORIDE SERPL-SCNC: 107 MMOL/L (ref 95–110)
CO2 SERPL-SCNC: 25 MMOL/L (ref 23–29)
CREAT SERPL-MCNC: 0.8 MG/DL (ref 0.5–1.4)
DIFFERENTIAL METHOD: ABNORMAL
EOSINOPHIL # BLD AUTO: 0 K/UL (ref 0–0.5)
EOSINOPHIL NFR BLD: 0.3 % (ref 0–8)
ERYTHROCYTE [DISTWIDTH] IN BLOOD BY AUTOMATED COUNT: 14.2 % (ref 11.5–14.5)
EST. GFR  (AFRICAN AMERICAN): >60 ML/MIN/1.73 M^2
EST. GFR  (NON AFRICAN AMERICAN): >60 ML/MIN/1.73 M^2
GLUCOSE SERPL-MCNC: 78 MG/DL (ref 70–110)
HCT VFR BLD AUTO: 36.1 % (ref 37–48.5)
HGB BLD-MCNC: 10.7 G/DL (ref 12–16)
IMM GRANULOCYTES # BLD AUTO: 0.06 K/UL (ref 0–0.04)
IMM GRANULOCYTES NFR BLD AUTO: 0.5 % (ref 0–0.5)
LYMPHOCYTES # BLD AUTO: 1.6 K/UL (ref 1–4.8)
LYMPHOCYTES NFR BLD: 14.6 % (ref 18–48)
MCH RBC QN AUTO: 26.9 PG (ref 27–31)
MCHC RBC AUTO-ENTMCNC: 29.6 G/DL (ref 32–36)
MCV RBC AUTO: 91 FL (ref 82–98)
MONOCYTES # BLD AUTO: 0.9 K/UL (ref 0.3–1)
MONOCYTES NFR BLD: 7.7 % (ref 4–15)
NEUTROPHILS # BLD AUTO: 8.5 K/UL (ref 1.8–7.7)
NEUTROPHILS NFR BLD: 76.7 % (ref 38–73)
NRBC BLD-RTO: 0 /100 WBC
PLATELET # BLD AUTO: 319 K/UL (ref 150–350)
PMV BLD AUTO: 10.6 FL (ref 9.2–12.9)
POTASSIUM SERPL-SCNC: 4 MMOL/L (ref 3.5–5.1)
PROT SERPL-MCNC: 6.7 G/DL (ref 6–8.4)
RBC # BLD AUTO: 3.98 M/UL (ref 4–5.4)
SODIUM SERPL-SCNC: 140 MMOL/L (ref 136–145)
TROPONIN I SERPL DL<=0.01 NG/ML-MCNC: 0.02 NG/ML (ref 0–0.03)
WBC # BLD AUTO: 11.02 K/UL (ref 3.9–12.7)

## 2020-03-30 PROCEDURE — 25000003 PHARM REV CODE 250: Performed by: EMERGENCY MEDICINE

## 2020-03-30 PROCEDURE — 63600175 PHARM REV CODE 636 W HCPCS: Performed by: HOSPITALIST

## 2020-03-30 PROCEDURE — 25000003 PHARM REV CODE 250: Performed by: HOSPITALIST

## 2020-03-30 PROCEDURE — 36415 COLL VENOUS BLD VENIPUNCTURE: CPT

## 2020-03-30 PROCEDURE — 80053 COMPREHEN METABOLIC PANEL: CPT

## 2020-03-30 PROCEDURE — 85025 COMPLETE CBC W/AUTO DIFF WBC: CPT

## 2020-03-30 PROCEDURE — 63600175 PHARM REV CODE 636 W HCPCS: Performed by: EMERGENCY MEDICINE

## 2020-03-30 PROCEDURE — 21400001 HC TELEMETRY ROOM

## 2020-03-30 PROCEDURE — 84484 ASSAY OF TROPONIN QUANT: CPT

## 2020-03-30 RX ORDER — BENAZEPRIL HYDROCHLORIDE 10 MG/1
40 TABLET ORAL DAILY
Status: DISCONTINUED | OUTPATIENT
Start: 2020-03-30 | End: 2020-03-31 | Stop reason: HOSPADM

## 2020-03-30 RX ORDER — AMLODIPINE BESYLATE 5 MG/1
10 TABLET ORAL DAILY
Status: DISCONTINUED | OUTPATIENT
Start: 2020-03-30 | End: 2020-03-31 | Stop reason: HOSPADM

## 2020-03-30 RX ORDER — ATORVASTATIN CALCIUM 40 MG/1
40 TABLET, FILM COATED ORAL NIGHTLY
Status: DISCONTINUED | OUTPATIENT
Start: 2020-03-30 | End: 2020-03-31 | Stop reason: HOSPADM

## 2020-03-30 RX ADMIN — ATORVASTATIN CALCIUM 40 MG: 40 TABLET, FILM COATED ORAL at 09:03

## 2020-03-30 RX ADMIN — PREDNISONE 20 MG: 20 TABLET ORAL at 08:03

## 2020-03-30 RX ADMIN — AMLODIPINE BESYLATE 10 MG: 5 TABLET ORAL at 05:03

## 2020-03-30 RX ADMIN — HYDRALAZINE HYDROCHLORIDE 10 MG: 20 INJECTION INTRAMUSCULAR; INTRAVENOUS at 03:03

## 2020-03-30 RX ADMIN — BENAZEPRIL HYDROCHLORIDE 40 MG: 10 TABLET, COATED ORAL at 05:03

## 2020-03-30 RX ADMIN — HYDRALAZINE HYDROCHLORIDE 10 MG: 20 INJECTION INTRAMUSCULAR; INTRAVENOUS at 09:03

## 2020-03-30 RX ADMIN — PANTOPRAZOLE SODIUM 40 MG: 40 TABLET, DELAYED RELEASE ORAL at 08:03

## 2020-03-30 RX ADMIN — CEFTRIAXONE 1 G: 1 INJECTION, POWDER, FOR SOLUTION INTRAMUSCULAR; INTRAVENOUS at 11:03

## 2020-03-30 RX ADMIN — AZITHROMYCIN 500 MG: 250 TABLET, FILM COATED ORAL at 08:03

## 2020-03-30 NOTE — PROGRESS NOTES
Patient reports pain in legs, PRN Tylenol 650mg given @ 2223. Patient reports relief.  Patient /94 PRN Hydralazine given @0303, no distress noted, will continue to monitor

## 2020-03-30 NOTE — PROGRESS NOTES
Received report from ROGELIO Jerez. Patient awake & alert resting quietly in bed, 3L O2 NC & telemetry monitoring in place, no distress noted. Safety maintained, call light in reach.

## 2020-03-30 NOTE — NURSING
Note that patient c/o pain to BLE;    Turned off SCD pump temporarily.     Also, patient given PRN hydralazine for elevated bp.    Shift-change report given to ROGELIO Samayoa.

## 2020-03-30 NOTE — NURSING
Spoke with Dr. Tafoya to stop NS infusion at 100 ml/hr and she was notified that pt takes amlodipine 10 mg.

## 2020-03-30 NOTE — PLAN OF CARE
Problem: Fall Injury Risk  Goal: Absence of Fall and Fall-Related Injury  Intervention: Identify and Manage Contributors to Fall Injury Risk  Flowsheets (Taken 3/29/2020 2002)  Self-Care Promotion: BADL personal objects within reach  Medication Review/Management: high risk medications identified  Intervention: Promote Injury-Free Environment  Flowsheets (Taken 3/29/2020 2002)  Safety Promotion/Fall Prevention: bed alarm set; Fall Risk reviewed with patient/family; room near unit station     Problem: Fall Injury Risk  Goal: Absence of Fall and Fall-Related Injury  Intervention: Identify and Manage Contributors to Fall Injury Risk  Flowsheets (Taken 3/29/2020 2002)  Self-Care Promotion: BADL personal objects within reach  Medication Review/Management: high risk medications identified     Problem: Fall Injury Risk  Goal: Absence of Fall and Fall-Related Injury  Intervention: Promote Injury-Free Environment  Flowsheets (Taken 3/29/2020 2002)  Safety Promotion/Fall Prevention: bed alarm set; Fall Risk reviewed with patient/family; room near unit station

## 2020-03-31 VITALS
HEART RATE: 80 BPM | HEIGHT: 60 IN | RESPIRATION RATE: 18 BRPM | BODY MASS INDEX: 21.39 KG/M2 | SYSTOLIC BLOOD PRESSURE: 146 MMHG | WEIGHT: 108.94 LBS | TEMPERATURE: 98 F | OXYGEN SATURATION: 99 % | DIASTOLIC BLOOD PRESSURE: 88 MMHG

## 2020-03-31 PROBLEM — J96.01 ACUTE RESPIRATORY FAILURE WITH HYPOXIA: Status: RESOLVED | Noted: 2020-03-28 | Resolved: 2020-03-31

## 2020-03-31 PROBLEM — N17.9 AKI (ACUTE KIDNEY INJURY): Status: RESOLVED | Noted: 2020-03-28 | Resolved: 2020-03-31

## 2020-03-31 LAB
ALBUMIN SERPL BCP-MCNC: 3.2 G/DL (ref 3.5–5.2)
ALP SERPL-CCNC: 55 U/L (ref 55–135)
ALT SERPL W/O P-5'-P-CCNC: 17 U/L (ref 10–44)
ANION GAP SERPL CALC-SCNC: 9 MMOL/L (ref 8–16)
AST SERPL-CCNC: 15 U/L (ref 10–40)
BASOPHILS # BLD AUTO: 0.02 K/UL (ref 0–0.2)
BASOPHILS NFR BLD: 0.2 % (ref 0–1.9)
BILIRUB SERPL-MCNC: 0.7 MG/DL (ref 0.1–1)
BUN SERPL-MCNC: 20 MG/DL (ref 8–23)
CALCIUM SERPL-MCNC: 8.7 MG/DL (ref 8.7–10.5)
CHLORIDE SERPL-SCNC: 107 MMOL/L (ref 95–110)
CO2 SERPL-SCNC: 25 MMOL/L (ref 23–29)
CREAT SERPL-MCNC: 0.8 MG/DL (ref 0.5–1.4)
DIFFERENTIAL METHOD: ABNORMAL
EOSINOPHIL # BLD AUTO: 0.1 K/UL (ref 0–0.5)
EOSINOPHIL NFR BLD: 0.5 % (ref 0–8)
ERYTHROCYTE [DISTWIDTH] IN BLOOD BY AUTOMATED COUNT: 14.2 % (ref 11.5–14.5)
EST. GFR  (AFRICAN AMERICAN): >60 ML/MIN/1.73 M^2
EST. GFR  (NON AFRICAN AMERICAN): >60 ML/MIN/1.73 M^2
GLUCOSE SERPL-MCNC: 62 MG/DL (ref 70–110)
HCT VFR BLD AUTO: 34.5 % (ref 37–48.5)
HGB BLD-MCNC: 10.1 G/DL (ref 12–16)
IMM GRANULOCYTES # BLD AUTO: 0.04 K/UL (ref 0–0.04)
IMM GRANULOCYTES NFR BLD AUTO: 0.4 % (ref 0–0.5)
LYMPHOCYTES # BLD AUTO: 1.9 K/UL (ref 1–4.8)
LYMPHOCYTES NFR BLD: 18.3 % (ref 18–48)
MCH RBC QN AUTO: 27.2 PG (ref 27–31)
MCHC RBC AUTO-ENTMCNC: 29.3 G/DL (ref 32–36)
MCV RBC AUTO: 93 FL (ref 82–98)
MONOCYTES # BLD AUTO: 1 K/UL (ref 0.3–1)
MONOCYTES NFR BLD: 9.4 % (ref 4–15)
NEUTROPHILS # BLD AUTO: 7.4 K/UL (ref 1.8–7.7)
NEUTROPHILS NFR BLD: 71.2 % (ref 38–73)
NRBC BLD-RTO: 0 /100 WBC
PLATELET # BLD AUTO: 299 K/UL (ref 150–350)
PMV BLD AUTO: 11.1 FL (ref 9.2–12.9)
POTASSIUM SERPL-SCNC: 4.4 MMOL/L (ref 3.5–5.1)
PROT SERPL-MCNC: 6.2 G/DL (ref 6–8.4)
RBC # BLD AUTO: 3.71 M/UL (ref 4–5.4)
SODIUM SERPL-SCNC: 141 MMOL/L (ref 136–145)
WBC # BLD AUTO: 10.42 K/UL (ref 3.9–12.7)

## 2020-03-31 PROCEDURE — 36415 COLL VENOUS BLD VENIPUNCTURE: CPT

## 2020-03-31 PROCEDURE — 25000003 PHARM REV CODE 250: Performed by: HOSPITALIST

## 2020-03-31 PROCEDURE — 63600175 PHARM REV CODE 636 W HCPCS: Performed by: HOSPITALIST

## 2020-03-31 PROCEDURE — 25000003 PHARM REV CODE 250: Performed by: EMERGENCY MEDICINE

## 2020-03-31 PROCEDURE — 63600175 PHARM REV CODE 636 W HCPCS: Performed by: EMERGENCY MEDICINE

## 2020-03-31 PROCEDURE — 80053 COMPREHEN METABOLIC PANEL: CPT

## 2020-03-31 PROCEDURE — 63700000 PHARM REV CODE 250 ALT 637 W/O HCPCS: Performed by: EMERGENCY MEDICINE

## 2020-03-31 PROCEDURE — 85025 COMPLETE CBC W/AUTO DIFF WBC: CPT

## 2020-03-31 RX ORDER — ALBUTEROL SULFATE 90 UG/1
2 AEROSOL, METERED RESPIRATORY (INHALATION) EVERY 4 HOURS PRN
Qty: 18 G | Refills: 11 | Status: SHIPPED | OUTPATIENT
Start: 2020-03-31

## 2020-03-31 RX ORDER — PREDNISONE 20 MG/1
20 TABLET ORAL DAILY
Qty: 3 TABLET | Refills: 0 | Status: SHIPPED | OUTPATIENT
Start: 2020-04-01 | End: 2020-04-04

## 2020-03-31 RX ORDER — AZITHROMYCIN 250 MG/1
250 TABLET, FILM COATED ORAL DAILY
Qty: 3 TABLET | Refills: 0 | Status: SHIPPED | OUTPATIENT
Start: 2020-04-01 | End: 2020-04-04

## 2020-03-31 RX ADMIN — AZITHROMYCIN 500 MG: 250 TABLET, FILM COATED ORAL at 09:03

## 2020-03-31 RX ADMIN — HYDRALAZINE HYDROCHLORIDE 10 MG: 20 INJECTION INTRAMUSCULAR; INTRAVENOUS at 06:03

## 2020-03-31 RX ADMIN — BENAZEPRIL HYDROCHLORIDE 40 MG: 10 TABLET, COATED ORAL at 09:03

## 2020-03-31 RX ADMIN — AMLODIPINE BESYLATE 10 MG: 5 TABLET ORAL at 09:03

## 2020-03-31 RX ADMIN — PANTOPRAZOLE SODIUM 40 MG: 40 TABLET, DELAYED RELEASE ORAL at 09:03

## 2020-03-31 RX ADMIN — PREDNISONE 20 MG: 20 TABLET ORAL at 09:03

## 2020-03-31 NOTE — PLAN OF CARE
10:29AM TN reviewed discharge instructions with pt.  COVID-19 WRITTEN DISCHARGE INSTRUCTIONS with teachback: 1. Wash carly with soap and water for 20 secs, 2. Use hand .  Help at home is significant other, Mark Delgado.    10:49AM TN Spoke with telemetry nurse Tiki that pt is [facility].     03/31/20 1050   Post-Acute Status   Discharge Delays None known at this time   Discharge Plan   Discharge Plan A Home with family   Discharge Plan B Home with family   .Bethany Dumont, RN, BSN, STN [facility]  3/31/2020

## 2020-03-31 NOTE — ASSESSMENT & PLAN NOTE
Hypoxic on room air requiring supplemental oxygen, likely asthma exacerbation.  No fever, cough, or other associated symptoms. CRP elevated, lymphopenia, normal procal, and mildly elevated trop somewhat concerning.  Placed in isolation.    covid pending

## 2020-03-31 NOTE — PROGRESS NOTES
[facility] WRITTEN HEALTHCARE AND DISCHARGE INFORMATION.  Follow-up Information     [facility]. Schedule an appointment as soon as possible for a visit in 2 weeks.    Contact information:  [address] [facility] [address]  [phone]             Schedule an appointment as soon as possible for a visit with [facility].    Contact information:  Patient will call.  Patient does not have doctor name and will make appointment from home for pcp care.                 Help at Home           [phone]  After discharge for assistance [facility] 24/7 Assistance.   Things You are responsible For To Manage Your Care At Home:  1.    Getting your prescriptions filled   2.    Taking your medications as directed, DO NOT MISS ANY DOSES!  3.    Going to your follow-up doctor appointment. This is important because it  allow the doctor to monitor your progress and determine if  any changes need to made to your treatment plan.   Thank you for choosing [facility] for your care. [facility] happy to have the opportunity to serve you.    Sincerely,  Your [facility],  Bethany Dumont RN, BSN, [facility]  [phone]  3/31/2020

## 2020-03-31 NOTE — DISCHARGE INSTRUCTIONS
Instructions for Patients Awaiting COVID-19 Test Results    You will either be called with your test result or it will be released to the patient portal.  If you have any questions about your test, please visit [email or web address] or call our COVID-19 information line at [phone].    Prevention steps for patients with confirmed or suspected COVID-19       Stay home and stay away from family members and friends. The CDC says, you can leave home after these three things have happened: 1) You have had no fever for at least 72 hours (that is three full days of no fever without the use of medicine that reduces fevers) 2) AND other symptoms have improved (for example, when your cough or shortness of breath have improved) 3) AND at least 7 days have passed since your symptoms first appeared.   Separate yourself from other people and animals in your home.   Call ahead before visiting your doctor.   Wear a facemask.   Cover your coughs and sneezes.   Wash your hands often with soap and water; hand  can be used, too.   Avoid sharing personal household items.   Wipe down surfaces used daily.   Monitor your symptoms. Seek prompt medical attention if your illness is worsening (e.g., difficulty breathing).    Before seeking care, call your healthcare provider.   If you have a medical emergency and need to call 911, notify the dispatch personnel that you have, or are being evaluated for COVID-19. If possible, put on a facemask before emergency medical services arrive.        Recommended precautions for household members, intimate partners, and caregivers in a home setting of a patient with symptomatic laboratory-confirmed COVID-19 or a patient under investigation.  Household members, intimate partners, and caregivers in the home setting awaiting tests results have close contact with a person with symptomatic, laboratory-confirmed COVID-19 or a person under investigation. Close contacts should  monitor their health; they should call their provider right away if they develop symptoms suggestive of COVID-19 (e.g., fever, cough, shortness of breath).    Close contacts should also follow these recommendations:   Make sure that you understand and can help the patient follow their provider's instructions for medication(s) and care. You should help the patient with basic needs in the home and provide support for getting groceries, prescriptions, and other personal needs.   Monitor the patient's symptoms. If the patient is getting sicker, call his or her healthcare provider and tell them that the patient has laboratory-confirmed COVID-19. If the patient has a medical emergency and you need to call 911, notify the dispatch personnel that the patient has, or is being evaluated for COVID-19.   Household members should stay in another room or be  from the patient. Household members should use a separate bedroom and bathroom, if available.   Prohibit visitors.   Household members should care for any pets in the home.   Make sure that shared spaces in the home have good air flow, such as by an air conditioner or an opened window, weather permitting.   Perform hand hygiene frequently. Wash your hands often with soap and water for at least 20 seconds or use an alcohol-based hand  (that contains > 60% alcohol) covering all surfaces of your hands and rubbing them together until they feel dry. Soap and water should be used preferentially.   Avoid touching your eyes, nose, and mouth.   The patient should wear a facemask. If the patient is not able to wear a facemask (for example, because it causes trouble breathing), caregivers should wear a mask when they are in the same room as the patient.   Wear a disposable facemask and gloves when you touch or have contact with the patient's blood, stool, or body fluids, such as saliva, sputum, nasal mucus, vomit, urine.  o Throw out disposable facemasks and  gloves after using them. Do not reuse.  o When removing personal protective equipment, first remove and dispose of gloves. Then, immediately clean your hands with soap and water or alcohol-based hand . Next, remove and dispose of facemask, and immediately clean your hands again with soap and water or alcohol-based hand .   You should not share dishes, drinking glasses, cups, eating utensils, towels, bedding, or other items with the patient. After the patient uses these items, you should wash them thoroughly (see below Wash laundry thoroughly).   Clean all high-touch surfaces, such as counters, tabletops, doorknobs, bathroom fixtures, toilets, phones, keyboards, tablets, and bedside tables, every day. Also, clean any surfaces that may have blood, stool, or body fluids on them.   Use a household cleaning spray or wipe, according to the label instructions. Labels contain instructions for safe and effective use of the cleaning product including precautions you should take when applying the product, such as wearing gloves and making sure you have good ventilation during use of the product.   Wash laundry thoroughly.  o Immediately remove and wash clothes or bedding that have blood, stool, or body fluids on them.  o Wear disposable gloves while handling soiled items and keep soiled items away from your body. Clean your hands (with soap and water or an alcohol-based hand ) immediately after removing your gloves.  o Read and follow directions on labels of laundry or clothing items and detergent. In general, using a normal laundry detergent according to washing machine instructions and dry thoroughly using the warmest temperatures recommended on the clothing label.   Place all used disposable gloves, facemasks, and other contaminated items in a lined container before disposing of them with other household waste. Clean your hands (with soap and water or an alcohol-based hand )  immediately after handling these items. Soap and water should be used preferentially if hands are visibly dirty.   Discuss any additional questions with your state or local health department or healthcare provider. Check available hours when contacting your local health department.    For more information see CDC link below.      https://www.cdc.gov/coronavirus/2019-ncov/hcp/guidance-prevent-spread.html#precautions        Sources:  [facility], [place] [facility] and [facility]

## 2020-03-31 NOTE — PROGRESS NOTES
[facility] Hospital Medicine  Progress Note    Patient Name: Gloria Garcia  MRN: [id number]  Patient Class: IP- Inpatient   Admission Date: 3/28/2020  Length of Stay: 1 days  Attending Physician: Morenita Tafoya MD  Primary Care Provider: No primary care provider on file.        Subjective:     Principal Problem:Acute respiratory failure with hypoxia        HPI:  85 y.o. female with asthma, HTN, and former smoker presents with a complaint of SOB.  Acute onset tonight, associated with wheezing and chest tightness.  Attempted self treatment with albuterol MDI without relief.  Similar occurrence 2/23/2020 for which she was hospitalized at [facility], no intubation, treated with prednisone.  Denies fever, chills, cough, chest pain, orthopnea, PND, dizziness, syncope, n/v/d, abd pain, bloody or black stools, dysuria, frequency, or urgency.  In the ED, she was found to be hypoxic on room air.  CXR without acute abnormality.  Labs reveal ROD, mildly elevated troponin, lymphopenia, and elevated CRP.  UA unremarkable.  procalcitonin normal.  Admitted to Hospital Medicine for further evaluation and treatment.    Overview/Hospital Course:  Pt admitted with SOB and ROD.     Covid test pending.   2 liters NC  BP uncontrolled   Renal function improved with IVF      Interval History: pt has no new complaints     Review of Systems   Constitutional: Negative for chills, fatigue and fever.   Eyes: Negative for photophobia and visual disturbance.   Respiratory: Positive for chest tightness, shortness of breath and wheezing. Negative for cough.    Cardiovascular: Negative for chest pain, palpitations and leg swelling.   Gastrointestinal: Negative for abdominal pain, diarrhea, nausea and vomiting.   Genitourinary: Negative for dysuria, frequency and urgency.   Skin: Negative for pallor, rash and wound.   Neurological: Negative for light-headedness and headaches.   Psychiatric/Behavioral: Negative for confusion and  decreased concentration.     Objective:     Vital Signs (Most Recent):  Temp: 98.7 °F (37.1 °C) (03/30/20 2137)  Pulse: 77 (03/30/20 2137)  Resp: 18 (03/30/20 2137)  BP: (!) 156/70 (03/30/20 2149)  SpO2: 100 % (03/30/20 2137) Vital Signs (24h Range):  Temp:  [97.4 °F (36.3 °C)-98.7 °F (37.1 °C)] 98.7 °F (37.1 °C)  Pulse:  [70-82] 77  Resp:  [18-19] 18  SpO2:  [99 %-100 %] 100 %  BP: (154-194)/(68-93) 156/70     Weight: 53.3 kg (117 lb 8.1 oz)  Body mass index is 22.95 kg/m².    Intake/Output Summary (Last 24 hours) at 3/30/2020 2255  Last data filed at 3/30/2020 1708  Gross per 24 hour   Intake 340 ml   Output --   Net 340 ml      Physical Exam   Constitutional: She is oriented to person, place, and time. She appears well-developed and well-nourished. No distress.   HENT:   Head: Normocephalic and atraumatic.   Right Ear: External ear normal.   Left Ear: External ear normal.   Nose: Nose normal.   Mouth/Throat: Oropharynx is clear and moist.   Eyes: Pupils are equal, round, and reactive to light. Conjunctivae and EOM are normal.   Neck: Normal range of motion. Neck supple.   Cardiovascular: Normal rate, regular rhythm and intact distal pulses.   Pulmonary/Chest: Effort normal. No respiratory distress. She has decreased breath sounds. She has no wheezes.   Abdominal: Soft. Bowel sounds are normal. She exhibits no distension. There is no tenderness.   No palpable hepatomegaly or splenomegaly    Musculoskeletal: Normal range of motion. She exhibits no edema or tenderness.   Neurological: She is alert and oriented to person, place, and time.   Skin: Skin is warm and dry.   Psychiatric: She has a normal mood and affect. Thought content normal.   Nursing note and vitals reviewed.      Significant Labs:   BMP:   Recent Labs   Lab 03/30/20  0746   GLU 78      K 4.0      CO2 25   BUN 15   CREATININE 0.8   CALCIUM 8.6*       Significant Imaging: I have reviewed and interpreted all pertinent imaging  results/findings within the past 24 hours.      Assessment/Plan:      * Acute respiratory failure with hypoxia  Hypoxic on room air requiring supplemental oxygen, likely asthma exacerbation.  No fever, cough, or other associated symptoms. CRP elevated, lymphopenia, normal procal, and mildly elevated trop somewhat concerning.  Placed in isolation.    covid pending       Essential hypertension  Poorly controlled, continue home medications and monitor blood pressure, adjust as needed.     Moderate asthma with acute exacerbation  Complaint of SOB and wheezing, somewhat improved with nebs given by EMS, continue albuterol MDI and low dose prednisone.    ROD (acute kidney injury)  Greater than 50% drop in GFR, Cr doubled.  Continue IV fluids, strict I/O's, monitor renal function and electrolytes, avoid nephrotoxic agents.       VTE Risk Mitigation (From admission, onward)         Ordered     IP VTE HIGH RISK PATIENT  Once      03/29/20 7138     Place sequential compression device  Until discontinued      03/29/20 9467                      Morenita Tafoya MD  Department of Hospital Medicine   [facility]

## 2020-03-31 NOTE — SUBJECTIVE & OBJECTIVE
Interval History: pt has no new complaints     Review of Systems   Constitutional: Negative for chills, fatigue and fever.   Eyes: Negative for photophobia and visual disturbance.   Respiratory: Positive for chest tightness, shortness of breath and wheezing. Negative for cough.    Cardiovascular: Negative for chest pain, palpitations and leg swelling.   Gastrointestinal: Negative for abdominal pain, diarrhea, nausea and vomiting.   Genitourinary: Negative for dysuria, frequency and urgency.   Skin: Negative for pallor, rash and wound.   Neurological: Negative for light-headedness and headaches.   Psychiatric/Behavioral: Negative for confusion and decreased concentration.     Objective:     Vital Signs (Most Recent):  Temp: 98.7 °F (37.1 °C) (03/30/20 2137)  Pulse: 77 (03/30/20 2137)  Resp: 18 (03/30/20 2137)  BP: (!) 156/70 (03/30/20 2149)  SpO2: 100 % (03/30/20 2137) Vital Signs (24h Range):  Temp:  [97.4 °F (36.3 °C)-98.7 °F (37.1 °C)] 98.7 °F (37.1 °C)  Pulse:  [70-82] 77  Resp:  [18-19] 18  SpO2:  [99 %-100 %] 100 %  BP: (154-194)/(68-93) 156/70     Weight: 53.3 kg (117 lb 8.1 oz)  Body mass index is 22.95 kg/m².    Intake/Output Summary (Last 24 hours) at 3/30/2020 3758  Last data filed at 3/30/2020 1708  Gross per 24 hour   Intake 340 ml   Output --   Net 340 ml      Physical Exam   Constitutional: She is oriented to person, place, and time. She appears well-developed and well-nourished. No distress.   HENT:   Head: Normocephalic and atraumatic.   Right Ear: External ear normal.   Left Ear: External ear normal.   Nose: Nose normal.   Mouth/Throat: Oropharynx is clear and moist.   Eyes: Pupils are equal, round, and reactive to light. Conjunctivae and EOM are normal.   Neck: Normal range of motion. Neck supple.   Cardiovascular: Normal rate, regular rhythm and intact distal pulses.   Pulmonary/Chest: Effort normal. No respiratory distress. She has decreased breath sounds. She has no wheezes.   Abdominal: Soft.  Bowel sounds are normal. She exhibits no distension. There is no tenderness.   No palpable hepatomegaly or splenomegaly    Musculoskeletal: Normal range of motion. She exhibits no edema or tenderness.   Neurological: She is alert and oriented to person, place, and time.   Skin: Skin is warm and dry.   Psychiatric: She has a normal mood and affect. Thought content normal.   Nursing note and vitals reviewed.      Significant Labs:   BMP:   Recent Labs   Lab 03/30/20  0746   GLU 78      K 4.0      CO2 25   BUN 15   CREATININE 0.8   CALCIUM 8.6*       Significant Imaging: I have reviewed and interpreted all pertinent imaging results/findings within the past 24 hours.

## 2020-03-31 NOTE — NURSING
Discharge instructions given to patient in blue folder. Belongings given to patient. IV and cardiac monitor removed. Three prescriptions were written for the patient; albuterol, azithromycin, and prednisone. Review of medications with pt. Pt stated that she has used an inhaler before and reteaching of how to use inhaler was reviewed with pt. Patient folder at desk was cleaned and put in filing bin.

## 2020-03-31 NOTE — HOSPITAL COURSE
84 y/o female with hx of asthma presents with shortness of breath.  Noted to be hypoxic on RA on presentation.  Admitted with acute hypoxic respiratory failure, probably secondary to asthma exacerbation.  Patient tested for COVID 19, but has remained afebrile with no associated symptoms.  Test results still pending.  She has remained afebrile and hemodynamically stable.  Empirically started on Rocephin and Zithromax on presentation.  Patient's symptoms have greatly improved.  Currently not requiring any oxygen and no wheezing on exam.  She is back to baseline and ready for discharge.  Patient will be given detailed COVID instruction upon discharge while test is still pending.  She will be discharged home on Albuterol inhaler, few more days of Zithromax and Prednisone.

## 2020-03-31 NOTE — NURSING
Hydralazine 10mg IV administered for b/p 168/77. See MARs.  0650- Report given to Tiki MERCADO. Pt had an uneventful night. Pt resting comfortably in bed. NAD noted. Airborne, droplet and contact precautions maintained.

## 2020-03-31 NOTE — DISCHARGE SUMMARY
[facility] Hospital Medicine  Discharge Summary      Patient Name: Gloria Garcia  MRN: [id number]  Admission Date: 3/28/2020  Hospital Length of Stay: 2 days  Discharge Date and Time:  03/31/2020 9:36 AM  Attending Physician: Washington Pierson MD   Discharging Provider: Washington Pierson MD  Primary Care Provider: No primary care provider on file.      HPI:   85 y.o. female with asthma, HTN, and former smoker presents with a complaint of SOB.  Acute onset tonight, associated with wheezing and chest tightness.  Attempted self treatment with albuterol MDI without relief.  Similar occurrence 2/23/2020 for which she was hospitalized at [facility], no intubation, treated with prednisone.  Denies fever, chills, cough, chest pain, orthopnea, PND, dizziness, syncope, n/v/d, abd pain, bloody or black stools, dysuria, frequency, or urgency.  In the ED, she was found to be hypoxic on room air.  CXR without acute abnormality.  Labs reveal ROD, mildly elevated troponin, lymphopenia, and elevated CRP.  UA unremarkable.  procalcitonin normal.  Admitted to Hospital Medicine for further evaluation and treatment.    * No surgery found *      Hospital Course:   84 y/o female with hx of asthma presents with shortness of breath.  Noted to be hypoxic on RA on presentation.  Admitted with acute hypoxic respiratory failure, probably secondary to asthma exacerbation.  Patient tested for COVID 19, but has remained afebrile with no associated symptoms.  Test results still pending.  She has remained afebrile and hemodynamically stable.  Empirically started on Rocephin and Zithromax on presentation.  Patient's symptoms have greatly improved.  Currently not requiring any oxygen and no wheezing on exam.  She is back to baseline and ready for discharge.  Patient will be given detailed COVID instruction upon discharge while test is still pending.  She will be discharged home on Albuterol inhaler, few more days of Zithromax and  Prednisone.       Consults:     No new Assessment & Plan notes have been filed under this hospital service since the last note was generated.  Service: Hospital Medicine    Final Active Diagnoses:    Diagnosis Date Noted POA    Moderate asthma with acute exacerbation [J45.901] 03/28/2020 Yes    Essential hypertension [I10] 03/28/2020 Yes     Chronic      Problems Resolved During this Admission:    Diagnosis Date Noted Date Resolved POA    PRINCIPAL PROBLEM:  Acute respiratory failure with hypoxia [J96.01] 03/28/2020 03/31/2020 Yes    ROD (acute kidney injury) [N17.9] 03/28/2020 03/31/2020 Yes       Discharged Condition: stable    Disposition: Home or Self Care    Follow Up:  Follow-up Information     [facility] In 2 weeks.    Contact information:  [address] [place] [address] [phone]                 Patient Instructions:      Diet Cardiac     Notify your health care provider if you experience any of the following:  temperature >100.4     Notify your health care provider if you experience any of the following:  persistent nausea and vomiting or diarrhea     Notify your health care provider if you experience any of the following:  severe uncontrolled pain     Notify your health care provider if you experience any of the following:  difficulty breathing or increased cough     Notify your health care provider if you experience any of the following:  persistent dizziness, light-headedness, or visual disturbances     Notify your health care provider if you experience any of the following:  increased confusion or weakness     Activity as tolerated         Pending Diagnostic Studies:     Procedure Component Value Units Date/Time    SARS- CoV-2 (COVID-19) QUALITATIVE PCR [[id number]] Collected:  03/29/20 0824    Order Status:  Sent Lab Status:  In process Updated:  03/29/20 0900    Specimen:  Nasopharyngeal          Medications:  Reconciled Home Medications:      Medication List      START  taking these medications    albuterol 90 mcg/actuation inhaler  Commonly known as:  PROVENTIL/VENTOLIN HFA  Inhale 2 puffs into the lungs every 4 (four) hours as needed for Wheezing or Shortness of Breath. Rescue     azithromycin 250 MG tablet  Commonly known as:  Z-DIMA  Take 1 tablet (250 mg total) by mouth once daily. for 3 days  Start taking on:  April 1, 2020     predniSONE 20 MG tablet  Commonly known as:  DELTASONE  Take 1 tablet (20 mg total) by mouth once daily. for 3 days  Start taking on:  April 1, 2020        CONTINUE taking these medications    amLODIPine-benazepriL 10-40 mg per capsule  Commonly known as:  LOTREL  Take 1 capsule by mouth once daily.     atorvastatin 40 MG tablet  Commonly known as:  LIPITOR  Take 40 mg by mouth once daily.            Indwelling Lines/Drains at time of discharge:   Lines/Drains/Airways     None                 Time spent on the discharge of patient: >30 minutes  Patient was seen and examined on the date of discharge and determined to be suitable for discharge.         Washington Pierson MD  Department of Hospital Medicine  [facility]

## 2020-04-01 ENCOUNTER — TELEPHONE (OUTPATIENT)
Dept: FAMILY MEDICINE | Facility: CLINIC | Age: 85
End: 2020-04-01

## 2020-04-01 LAB — SARS-COV-2 RNA RESP QL NAA+PROBE: NORMAL

## 2024-04-20 ENCOUNTER — APPOINTMENT (OUTPATIENT)
Dept: RADIOLOGY | Facility: OTHER | Age: 89
End: 2024-04-20
Attending: EMERGENCY MEDICINE
Payer: MEDICARE

## 2024-04-20 PROCEDURE — 71045 X-RAY EXAM CHEST 1 VIEW: CPT | Mod: 26,,, | Performed by: RADIOLOGY

## 2024-04-20 PROCEDURE — 71045 X-RAY EXAM CHEST 1 VIEW: CPT | Mod: TC

## 2024-07-09 ENCOUNTER — HOSPITAL ENCOUNTER (OUTPATIENT)
Facility: HOSPITAL | Age: 89
Discharge: HOME | End: 2024-07-10
Attending: EMERGENCY MEDICINE | Admitting: HOSPITALIST
Payer: MEDICARE

## 2024-07-09 DIAGNOSIS — R07.9 CHEST PAIN: ICD-10-CM

## 2024-07-09 DIAGNOSIS — J44.1 COPD EXACERBATION: Primary | ICD-10-CM

## 2024-07-09 DIAGNOSIS — R06.02 SHORTNESS OF BREATH: ICD-10-CM

## 2024-07-09 PROBLEM — J96.02 ACUTE RESPIRATORY FAILURE WITH HYPOXIA AND HYPERCAPNIA: Status: ACTIVE | Noted: 2024-07-09

## 2024-07-09 PROBLEM — J96.01 ACUTE RESPIRATORY FAILURE WITH HYPOXIA AND HYPERCAPNIA: Status: ACTIVE | Noted: 2024-07-09

## 2024-07-09 LAB
ALBUMIN SERPL BCP-MCNC: 3.9 G/DL (ref 3.5–5.2)
ALLENS TEST: ABNORMAL
ALP SERPL-CCNC: 51 U/L (ref 55–135)
ALT SERPL W/O P-5'-P-CCNC: 7 U/L (ref 10–44)
ANION GAP SERPL CALC-SCNC: 11 MMOL/L (ref 8–16)
AST SERPL-CCNC: 16 U/L (ref 10–40)
BASOPHILS # BLD AUTO: 0.06 K/UL (ref 0–0.2)
BASOPHILS NFR BLD: 1.1 % (ref 0–1.9)
BILIRUB SERPL-MCNC: 0.5 MG/DL (ref 0.1–1)
BNP SERPL-MCNC: 49 PG/ML (ref 0–99)
BUN SERPL-MCNC: 20 MG/DL (ref 8–23)
CALCIUM SERPL-MCNC: 9.4 MG/DL (ref 8.7–10.5)
CHLORIDE SERPL-SCNC: 108 MMOL/L (ref 95–110)
CO2 SERPL-SCNC: 22 MMOL/L (ref 23–29)
CREAT SERPL-MCNC: 1.5 MG/DL (ref 0.5–1.4)
DELSYS: ABNORMAL
DIFFERENTIAL METHOD BLD: ABNORMAL
EOSINOPHIL # BLD AUTO: 0.2 K/UL (ref 0–0.5)
EOSINOPHIL NFR BLD: 4.2 % (ref 0–8)
ERYTHROCYTE [DISTWIDTH] IN BLOOD BY AUTOMATED COUNT: 13.2 % (ref 11.5–14.5)
ERYTHROCYTE [SEDIMENTATION RATE] IN BLOOD BY WESTERGREN METHOD: 19 MM/H
EST. GFR  (NO RACE VARIABLE): 33.5 ML/MIN/1.73 M^2
FIO2: 32
FLOW: 3
GLUCOSE SERPL-MCNC: 104 MG/DL (ref 70–110)
HCO3 UR-SCNC: 28.8 MMOL/L (ref 24–28)
HCT VFR BLD AUTO: 38 % (ref 37–48.5)
HCV AB SERPL QL IA: NORMAL
HGB BLD-MCNC: 11.8 G/DL (ref 12–16)
HIV 1+2 AB+HIV1 P24 AG SERPL QL IA: NORMAL
IMM GRANULOCYTES # BLD AUTO: 0.01 K/UL (ref 0–0.04)
IMM GRANULOCYTES NFR BLD AUTO: 0.2 % (ref 0–0.5)
INFLUENZA A, MOLECULAR: NEGATIVE
INFLUENZA B, MOLECULAR: NEGATIVE
LYMPHOCYTES # BLD AUTO: 2.2 K/UL (ref 1–4.8)
LYMPHOCYTES NFR BLD: 40.1 % (ref 18–48)
MCH RBC QN AUTO: 27.6 PG (ref 27–31)
MCHC RBC AUTO-ENTMCNC: 31.1 G/DL (ref 32–36)
MCV RBC AUTO: 89 FL (ref 82–98)
MODE: ABNORMAL
MONOCYTES # BLD AUTO: 0.3 K/UL (ref 0.3–1)
MONOCYTES NFR BLD: 5.8 % (ref 4–15)
NEUTROPHILS # BLD AUTO: 2.7 K/UL (ref 1.8–7.7)
NEUTROPHILS NFR BLD: 48.6 % (ref 38–73)
NRBC BLD-RTO: 0 /100 WBC
OHS QRS DURATION: 68 MS
OHS QTC CALCULATION: 478 MS
PCO2 BLDA: 53.9 MMHG (ref 35–45)
PH SMN: 7.33 [PH] (ref 7.35–7.45)
PLATELET # BLD AUTO: 307 K/UL (ref 150–450)
PMV BLD AUTO: 9.3 FL (ref 9.2–12.9)
PO2 BLDA: 17 MMHG (ref 40–60)
POC BE: 3 MMOL/L
POC SATURATED O2: 20 % (ref 95–100)
POC TCO2: 30 MMOL/L (ref 24–29)
POTASSIUM SERPL-SCNC: 4.4 MMOL/L (ref 3.5–5.1)
PROT SERPL-MCNC: 7.3 G/DL (ref 6–8.4)
RBC # BLD AUTO: 4.27 M/UL (ref 4–5.4)
SAMPLE: ABNORMAL
SARS-COV-2 RDRP RESP QL NAA+PROBE: NEGATIVE
SITE: ABNORMAL
SODIUM SERPL-SCNC: 141 MMOL/L (ref 136–145)
SP02: 100
SPECIMEN SOURCE: NORMAL
TROPONIN I SERPL DL<=0.01 NG/ML-MCNC: 0.01 NG/ML (ref 0–0.03)
WBC # BLD AUTO: 5.48 K/UL (ref 3.9–12.7)

## 2024-07-09 PROCEDURE — G0378 HOSPITAL OBSERVATION PER HR: HCPCS

## 2024-07-09 PROCEDURE — U0002 COVID-19 LAB TEST NON-CDC: HCPCS

## 2024-07-09 PROCEDURE — 96372 THER/PROPH/DIAG INJ SC/IM: CPT | Performed by: PHYSICIAN ASSISTANT

## 2024-07-09 PROCEDURE — 94640 AIRWAY INHALATION TREATMENT: CPT | Mod: XB

## 2024-07-09 PROCEDURE — 63600175 PHARM REV CODE 636 W HCPCS: Performed by: PHYSICIAN ASSISTANT

## 2024-07-09 PROCEDURE — 87389 HIV-1 AG W/HIV-1&-2 AB AG IA: CPT | Performed by: PHYSICIAN ASSISTANT

## 2024-07-09 PROCEDURE — 25000003 PHARM REV CODE 250: Performed by: PHYSICIAN ASSISTANT

## 2024-07-09 PROCEDURE — 84484 ASSAY OF TROPONIN QUANT: CPT

## 2024-07-09 PROCEDURE — 86803 HEPATITIS C AB TEST: CPT | Performed by: PHYSICIAN ASSISTANT

## 2024-07-09 PROCEDURE — 25000242 PHARM REV CODE 250 ALT 637 W/ HCPCS: Performed by: PHYSICIAN ASSISTANT

## 2024-07-09 PROCEDURE — 82803 BLOOD GASES ANY COMBINATION: CPT

## 2024-07-09 PROCEDURE — 85025 COMPLETE CBC W/AUTO DIFF WBC: CPT

## 2024-07-09 PROCEDURE — 93005 ELECTROCARDIOGRAM TRACING: CPT

## 2024-07-09 PROCEDURE — 87502 INFLUENZA DNA AMP PROBE: CPT

## 2024-07-09 PROCEDURE — 82800 BLOOD PH: CPT

## 2024-07-09 PROCEDURE — 93010 ELECTROCARDIOGRAM REPORT: CPT | Mod: ,,, | Performed by: INTERNAL MEDICINE

## 2024-07-09 PROCEDURE — 25000242 PHARM REV CODE 250 ALT 637 W/ HCPCS

## 2024-07-09 PROCEDURE — 80053 COMPREHEN METABOLIC PANEL: CPT

## 2024-07-09 PROCEDURE — 99900035 HC TECH TIME PER 15 MIN (STAT)

## 2024-07-09 PROCEDURE — 94761 N-INVAS EAR/PLS OXIMETRY MLT: CPT

## 2024-07-09 PROCEDURE — 83880 ASSAY OF NATRIURETIC PEPTIDE: CPT

## 2024-07-09 PROCEDURE — 27000221 HC OXYGEN, UP TO 24 HOURS

## 2024-07-09 RX ORDER — ATORVASTATIN CALCIUM 40 MG/1
40 TABLET, FILM COATED ORAL DAILY
Status: DISCONTINUED | OUTPATIENT
Start: 2024-07-09 | End: 2024-07-10 | Stop reason: HOSPADM

## 2024-07-09 RX ORDER — POLYETHYLENE GLYCOL 3350 17 G/17G
17 POWDER, FOR SOLUTION ORAL DAILY PRN
Status: DISCONTINUED | OUTPATIENT
Start: 2024-07-09 | End: 2024-07-10 | Stop reason: HOSPADM

## 2024-07-09 RX ORDER — ACETAMINOPHEN 500 MG
500 TABLET ORAL 2 TIMES DAILY PRN
COMMUNITY

## 2024-07-09 RX ORDER — HEPARIN SODIUM 5000 [USP'U]/ML
5000 INJECTION, SOLUTION INTRAVENOUS; SUBCUTANEOUS EVERY 8 HOURS
Status: DISCONTINUED | OUTPATIENT
Start: 2024-07-09 | End: 2024-07-10 | Stop reason: HOSPADM

## 2024-07-09 RX ORDER — SODIUM CHLORIDE 0.9 % (FLUSH) 0.9 %
3 SYRINGE (ML) INJECTION
Status: DISCONTINUED | OUTPATIENT
Start: 2024-07-09 | End: 2024-07-10 | Stop reason: HOSPADM

## 2024-07-09 RX ORDER — TALC
6 POWDER (GRAM) TOPICAL NIGHTLY PRN
Status: DISCONTINUED | OUTPATIENT
Start: 2024-07-09 | End: 2024-07-10 | Stop reason: HOSPADM

## 2024-07-09 RX ORDER — ONDANSETRON HYDROCHLORIDE 2 MG/ML
4 INJECTION, SOLUTION INTRAVENOUS EVERY 12 HOURS PRN
Status: DISCONTINUED | OUTPATIENT
Start: 2024-07-09 | End: 2024-07-09

## 2024-07-09 RX ORDER — ONDANSETRON 8 MG/1
8 TABLET, ORALLY DISINTEGRATING ORAL EVERY 8 HOURS PRN
Status: DISCONTINUED | OUTPATIENT
Start: 2024-07-09 | End: 2024-07-09

## 2024-07-09 RX ORDER — IPRATROPIUM BROMIDE AND ALBUTEROL SULFATE 2.5; .5 MG/3ML; MG/3ML
3 SOLUTION RESPIRATORY (INHALATION)
Status: DISCONTINUED | OUTPATIENT
Start: 2024-07-09 | End: 2024-07-09

## 2024-07-09 RX ORDER — GLUCAGON 1 MG
1 KIT INJECTION
Status: DISCONTINUED | OUTPATIENT
Start: 2024-07-09 | End: 2024-07-10 | Stop reason: HOSPADM

## 2024-07-09 RX ORDER — AMLODIPINE BESYLATE 10 MG/1
10 TABLET ORAL DAILY
Status: DISCONTINUED | OUTPATIENT
Start: 2024-07-09 | End: 2024-07-10 | Stop reason: HOSPADM

## 2024-07-09 RX ORDER — PREDNISONE 20 MG/1
40 TABLET ORAL DAILY
Status: DISCONTINUED | OUTPATIENT
Start: 2024-07-09 | End: 2024-07-10 | Stop reason: HOSPADM

## 2024-07-09 RX ORDER — PROCHLORPERAZINE EDISYLATE 5 MG/ML
5 INJECTION INTRAMUSCULAR; INTRAVENOUS EVERY 6 HOURS PRN
Status: DISCONTINUED | OUTPATIENT
Start: 2024-07-09 | End: 2024-07-10 | Stop reason: HOSPADM

## 2024-07-09 RX ORDER — ALBUTEROL SULFATE 90 UG/1
2 AEROSOL, METERED RESPIRATORY (INHALATION) EVERY 4 HOURS PRN
Status: DISCONTINUED | OUTPATIENT
Start: 2024-07-09 | End: 2024-07-09

## 2024-07-09 RX ORDER — IBUPROFEN 200 MG
16 TABLET ORAL
Status: DISCONTINUED | OUTPATIENT
Start: 2024-07-09 | End: 2024-07-10 | Stop reason: HOSPADM

## 2024-07-09 RX ORDER — ALBUTEROL SULFATE 2.5 MG/.5ML
10 SOLUTION RESPIRATORY (INHALATION)
Status: COMPLETED | OUTPATIENT
Start: 2024-07-09 | End: 2024-07-09

## 2024-07-09 RX ORDER — IBUPROFEN 200 MG
24 TABLET ORAL
Status: DISCONTINUED | OUTPATIENT
Start: 2024-07-09 | End: 2024-07-10 | Stop reason: HOSPADM

## 2024-07-09 RX ORDER — DONEPEZIL HYDROCHLORIDE 5 MG/1
5 TABLET, FILM COATED ORAL NIGHTLY
COMMUNITY

## 2024-07-09 RX ORDER — ACETAMINOPHEN 325 MG/1
650 TABLET ORAL EVERY 8 HOURS PRN
Status: DISCONTINUED | OUTPATIENT
Start: 2024-07-09 | End: 2024-07-09

## 2024-07-09 RX ORDER — IPRATROPIUM BROMIDE AND ALBUTEROL SULFATE 2.5; .5 MG/3ML; MG/3ML
3 SOLUTION RESPIRATORY (INHALATION)
Status: COMPLETED | OUTPATIENT
Start: 2024-07-09 | End: 2024-07-09

## 2024-07-09 RX ORDER — IPRATROPIUM BROMIDE AND ALBUTEROL SULFATE 2.5; .5 MG/3ML; MG/3ML
3 SOLUTION RESPIRATORY (INHALATION)
Status: DISCONTINUED | OUTPATIENT
Start: 2024-07-09 | End: 2024-07-10 | Stop reason: HOSPADM

## 2024-07-09 RX ORDER — NALOXONE HCL 0.4 MG/ML
0.02 VIAL (ML) INJECTION
Status: DISCONTINUED | OUTPATIENT
Start: 2024-07-09 | End: 2024-07-10 | Stop reason: HOSPADM

## 2024-07-09 RX ADMIN — ATORVASTATIN CALCIUM 40 MG: 40 TABLET, FILM COATED ORAL at 12:07

## 2024-07-09 RX ADMIN — PREDNISONE 40 MG: 20 TABLET ORAL at 12:07

## 2024-07-09 RX ADMIN — IPRATROPIUM BROMIDE AND ALBUTEROL SULFATE 3 ML: 2.5; .5 SOLUTION RESPIRATORY (INHALATION) at 07:07

## 2024-07-09 RX ADMIN — ALBUTEROL SULFATE 10 MG: 2.5 SOLUTION RESPIRATORY (INHALATION) at 09:07

## 2024-07-09 RX ADMIN — IPRATROPIUM BROMIDE AND ALBUTEROL SULFATE 3 ML: 2.5; .5 SOLUTION RESPIRATORY (INHALATION) at 08:07

## 2024-07-09 RX ADMIN — HEPARIN SODIUM 5000 UNITS: 5000 INJECTION INTRAVENOUS; SUBCUTANEOUS at 09:07

## 2024-07-09 RX ADMIN — HEPARIN SODIUM 5000 UNITS: 5000 INJECTION INTRAVENOUS; SUBCUTANEOUS at 02:07

## 2024-07-09 RX ADMIN — IPRATROPIUM BROMIDE AND ALBUTEROL SULFATE 3 ML: 2.5; .5 SOLUTION RESPIRATORY (INHALATION) at 01:07

## 2024-07-09 RX ADMIN — AMLODIPINE BESYLATE 10 MG: 10 TABLET ORAL at 12:07

## 2024-07-09 NOTE — HPI
Ms. Gloria Garcia is a 88 yo F w/ a pmhx of hypertension, dementia, COPD and asthma w/ presents w/ c/o shortness of breath, which began last night. Pt reports SOB began while in her home at rest. She endorses associated dry cough. She has utilized her albuterol treatments multiple times w/o significant improvement. States she does not have home O2. Denies any fever, chills, congestion, malaise, wheezing, chest pain, lower leg edema, REIS. She reports compliance w/ her home medications.     In ED, Pt is afebrile, satting 90% on room air, placed on 4L nasal cannula with improvement to %. No leukocytosis, no significant electrolyte abnormalities. Cr 1.5. BNP 49, trop 0.006. PH 7.33, CO2 53.9, PO2 17, HCO3 28.8. Covid and flu negative. CXR without acute process  Received 125 mg solumedrol and duoneb x1 per EMS. Given duoneb x2 and 1 continous albuterol in the ED.

## 2024-07-09 NOTE — PHARMACY MED REC
"Admission Medication History     The home medication history was taken by Su Myers.    You may go to "Admission" then "Reconcile Home Medications" tabs to review and/or act upon these items.     The home medication list has been updated by the Pharmacy department.   Please read ALL comments highlighted in yellow.   Please address this information as you see fit.    Feel free to contact us if you have any questions or require assistance.      Medications listed below were obtained from: Patient and Analytic software- [employer] Medications   Medication Sig    acetaminophen (TYLENOL) 500 MG tablet   Take 500 mg by mouth 2 (two) times daily as needed for Pain.    albuterol (PROVENTIL/VENTOLIN HFA) 90 mcg/actuation inhaler   Inhale 1-2 puffs into the lungs every 6 (six) hours as needed for Wheezing. Rescue    amlodipine-benazepril 10-20mg (LOTREL) 10-20 mg per capsule   Take 1 capsule by mouth every morning.    atorvastatin (LIPITOR) 10 MG tablet   Take 10 mg by mouth once daily.    cyanocobalamin, vitamin B-12, (VITAMIN B-12 ORAL)   Take 1 tablet by mouth once daily.    trolamine salicylate (ARTHRITIS TOP)   Apply topically daily as needed (Pain).    ARICEPT 5 mg tablet Take 5 mg by mouth every evening.       Potential issues to be addressed PRIOR TO DISCHARGE  Patient requested refills for the following medications: (VENTOLIN INH)          Su Myers  EXT [id number]                .          "

## 2024-07-09 NOTE — NURSING
Nurses Note -- 4 Eyes      7/9/2024   4:01 PM      Skin assessed during: Admit      [] No Altered Skin Integrity Present    []Prevention Measures Documented      [] Yes- Altered Skin Integrity Present or Discovered   [] LDA Added if Not in Epic (Describe Wound)   [] New Altered Skin Integrity was Present on Admit and Documented in LDA   [] Wound Image Taken    Wound Care Consulted? No    Attending Nurse:  Mary Bazan RN/Staff Member:   Hermes

## 2024-07-09 NOTE — ED NOTES
Assumed care of the patient. Report received from Sam MERCADO. Pt on continuous cardiac monitoring, continuous pulse oximetry, and automatic BP cuff cycling Q 15 min. Pt in hospital gown, side rails up X2, bed low and locked, and call light is placed within reach. No family/visitors at bedside at this time. Pt denies any complaints or needs.

## 2024-07-09 NOTE — PROGRESS NOTES
07/09/24 1531   Vital Signs   Temp 98.2 °F (36.8 °C)   Temp Source Oral   Pulse 89   Heart Rate Source Monitor   Resp 16   SpO2 100 %   BP (!) 153/67   MAP (mmHg) 96   BP Location Left arm   Patient Position Lying   Height and Weight   Weight 89 kg (196 lb 3.4 oz)   Weight Method Bed Scale   Assessments (Pre/Post)   Level of Consciousness (AVPU) alert     Pt arrived to the unit. Pt ambulated to the bed with a one person assistance. No distress noted. NC 1L. Bed in lowest position. Side rails up x2. Call bell and personal belongs within reach. Bed alarm activated. Safety precautions maintained. Pt free of falls or injuries. No further issues or concerns at this time. Plan of care continues.

## 2024-07-09 NOTE — ED TRIAGE NOTES
PT arrives to ED via EMS with complaints of SOB.  Pt does breathing treatments at home but reports her mask is broken/not working and was not able to get her treatments effectively  Pt started experiencing SOB last night . . Pt is supposed to be on  1L O2 at home but ran out a couple days ago. Denies CP . PT has hx of asthma and COPD.

## 2024-07-09 NOTE — ASSESSMENT & PLAN NOTE
Moderate asthma w/ acute exacerbation  Patient's COPD is with exacerbation noted by continued dyspnea and worsening of baseline hypoxia currently.  Patient is currently on COPD Pathway. Continue scheduled inhalers Steroids and Supplemental oxygen and monitor respiratory status closely.     - 4L nasal cannula with improvement to %.  - No leukocytosis, no significant electrolyte abnormalities.  - BNP 49, trop 0.006  - PH 7.33, CO2 53.9, PO2 17, HCO3 28.8.   - Covid and flu negative.  - CXR without acute process  - s/p 125 mg solumedrol x1, now prednisone 40 mg qd  - duonebs q6h and incentive spirometry  - COPD pathway  - prn O2  - 6mwt prior to dc  - continue to monitor

## 2024-07-09 NOTE — ED PROVIDER NOTES
Encounter Date: 2024       History     Chief Complaint   Patient presents with    Shortness of Breath     Arrives via EMS with c/o SOB received Due neb in route and IV steroids RA currently 93% hx of asthma      Pt is an 89 year old female with PMHx significant for HTN, asthma, and COPD who presents for evaluation of shortness of breath, which began last night. Associated cough. She has utilized her albuterol treatments more often. States she utilized 1 L home O2 but has run out in the last several days. No fever, chills, chest pain, nausea, vomiting, numbness, or weakness. Received duoneb x1 and solumedrol 125 mg per EMS prior to arrival.    The history is provided by the patient, the EMS personnel and medical records.     Review of patient's allergies indicates:  No Known Allergies  Past Medical History:   Diagnosis Date    Asthma     COPD exacerbation 2024    Hypertension      Past Surgical History:   Procedure Laterality Date    HYSTERECTOMY       Family History   Problem Relation Name Age of Onset    Hypertension Mother      Stroke Father       Social History     Tobacco Use    Smoking status: Former     Current packs/day: 0.00     Types: Cigarettes     Quit date: 3/28/1995     Years since quittin.3    Smokeless tobacco: Never   Substance Use Topics    Alcohol use: Yes     Comment: occasional/social    Drug use: Never     Review of Systems    Physical Exam     Initial Vitals   BP Pulse Resp Temp SpO2   24 0803 24 0803 24 0803 24 0804 24 0803   (!) 200/100 76 18 97.9 °F (36.6 °C) (!) 93 %      MAP       --                Physical Exam    Nursing note and vitals reviewed.  Constitutional: She appears well-developed and well-nourished. No distress.   HENT:   Head: Normocephalic and atraumatic.   Eyes: EOM are normal. Pupils are equal, round, and reactive to light.   Neck: Neck supple. No tracheal deviation present.   Normal range of motion.  Cardiovascular:  Normal rate,  regular rhythm and intact distal pulses.           No murmur heard.  Pulmonary/Chest: No stridor. No respiratory distress. She has wheezes. She has no rhonchi. She has no rales.   Abdominal: Abdomen is soft. She exhibits no distension. There is no abdominal tenderness.   Musculoskeletal:         General: No edema. Normal range of motion.      Cervical back: Normal range of motion and neck supple.     Neurological: She is alert and oriented to person, place, and time.   Skin: Skin is warm and dry. Capillary refill takes less than 2 seconds.         ED Course   Procedures  Labs Reviewed   CBC W/ AUTO DIFFERENTIAL - Abnormal; Notable for the following components:       Result Value    Hemoglobin 11.8 (*)     MCHC 31.1 (*)     All other components within normal limits    Narrative:     Release to patient->Immediate   COMPREHENSIVE METABOLIC PANEL - Abnormal; Notable for the following components:    CO2 22 (*)     Creatinine 1.5 (*)     Alkaline Phosphatase 51 (*)     ALT 7 (*)     eGFR 33.5 (*)     All other components within normal limits    Narrative:     Release to patient->Immediate   ISTAT PROCEDURE - Abnormal; Notable for the following components:    POC PH 7.335 (*)     POC PCO2 53.9 (*)     POC PO2 17 (*)     POC HCO3 28.8 (*)     POC BE 3 (*)     POC TCO2 30 (*)     All other components within normal limits   INFLUENZA A & B BY MOLECULAR   HIV 1 / 2 ANTIBODY    Narrative:     Release to patient->Immediate    add BNP per Misha Guerin Jr, MD  07/09/2024  09:40    HEPATITIS C ANTIBODY    Narrative:     Release to patient->Immediate    add BNP per Misha Guerin Jr, MD  07/09/2024  09:40    TROPONIN I    Narrative:     Release to patient->Immediate   SARS-COV-2 RNA AMPLIFICATION, QUAL   B-TYPE NATRIURETIC PEPTIDE   B-TYPE NATRIURETIC PEPTIDE    Narrative:     Release to patient->Immediate    add BNP per Misha Guerin Jr, MD  07/09/2024  09:40         ECG Results              EKG 12-lead (Final result)         Collection Time Result Time QRS Duration OHS QTC Calculation    07/09/24 08:15:29 07/09/24 09:36:24 68 478                     Final result by Interface, Lab In [facility] (07/09/24 09:36:26)                   Narrative:    Test Reason : R06.02,    Vent. Rate : 066 BPM     Atrial Rate : 066 BPM     P-R Int : 152 ms          QRS Dur : 068 ms      QT Int : 456 ms       P-R-T Axes : 051 049 074 degrees     QTc Int : 478 ms    Normal sinus rhythm  Nonspecific T wave abnormality  Low septal forces ; Abnormal R wave progression in the precordial leads  Prolonged QT  Abnormal ECG  When compared with ECG of 20-APR-2024 23:40,  No significant change was found  Confirmed by Steve WILLIAM MD ([id number]) on 7/9/2024 9:36:19 AM    Referred By: System System           Confirmed By:Steve WILLIAM MD                                  Imaging Results              X-Ray Chest AP Portable (Final result)  Result time 07/09/24 10:28:00      Final result by Felipe Vega MD (07/09/24 10:28:00)                   Impression:      No significant intrathoracic abnormality directly referable to the current history of dyspnea is observed.  No significant detrimental interval change in the appearance of the chest since 04/20/2024 is appreciated.      Electronically signed by: Felipe Vega MD  Date:    07/09/2024  Time:    10:28               Narrative:    EXAMINATION:  XR CHEST AP PORTABLE    CLINICAL HISTORY:  shortness of breath;    TECHNIQUE:  One view    COMPARISON:  Comparison is made to 04/20/2024.  Clinical information of shortness of breath.    FINDINGS:  Heart size is within normal limits, as is the appearance of the pulmonary vascularity.  Lung zones appear stable, and are free of significant airspace consolidation or volume loss.  No definite pulmonary parenchymal nodules.  No pleural fluid.  No abnormal mediastinal widening, with some tortuosity of the thoracic aorta again incidentally noted.  Skin folds are superimposed over both hemithoraces, but  no pneumothorax is appreciated.                                       Medications   amLODIPine tablet 10 mg (10 mg Oral Given 7/9/24 1201)   atorvastatin tablet 40 mg (40 mg Oral Given 7/9/24 1201)   sodium chloride 0.9% flush 3 mL (has no administration in time range)   predniSONE tablet 40 mg (40 mg Oral Given 7/9/24 1201)   albuterol-ipratropium 2.5 mg-0.5 mg/3 mL nebulizer solution 3 mL (3 mLs Nebulization Given 7/9/24 1936)   heparin (porcine) injection 5,000 Units (5,000 Units Subcutaneous Given 7/9/24 1411)   melatonin tablet 6 mg (has no administration in time range)   polyethylene glycol packet 17 g (has no administration in time range)   prochlorperazine injection Soln 5 mg (has no administration in time range)   naloxone 0.4 mg/mL injection 0.02 mg (has no administration in time range)   glucose chewable tablet 16 g (has no administration in time range)   glucose chewable tablet 24 g (has no administration in time range)   glucagon (human recombinant) injection 1 mg (has no administration in time range)   dextrose 10% bolus 125 mL 125 mL (has no administration in time range)   dextrose 10% bolus 250 mL 250 mL (has no administration in time range)   albuterol-ipratropium 2.5 mg-0.5 mg/3 mL nebulizer solution 3 mL (3 mLs Nebulization Given 7/9/24 0835)   albuterol sulfate nebulizer solution 10 mg (10 mg Nebulization Given 7/9/24 0929)     Medical Decision Making  Pt presents for shortness of breath. Ddx: covid, flu, pneumonia, asthma exacerbation, COPD, CHF, ACS. Pt with diffuse wheezing on exam, satting 90% on room air. Placed on 4L nasal cannula with improvement to %. Received 125 mg solumedrol and duoneb x1 per EMS. Given duoneb x2 and 1 continous albuterol in the ED with improvement in wheezing. Labs without leukocytosis. Covid and flu negative. CXR without acute process. EKG without ST elevation. Trop negative. Discussed with hospital medicine, plan for admission for further management and  evaluation     Amount and/or Complexity of Data Reviewed  Labs: ordered.  Radiology: ordered.    Risk  Prescription drug management.                                      Clinical Impression:  Final diagnoses:  [R06.02] Shortness of breath  [J44.1] COPD exacerbation          ED Disposition Condition    Observation                 Misha Guerin Jr., MD  Resident  07/09/24 1952

## 2024-07-09 NOTE — ASSESSMENT & PLAN NOTE
Patient with Hypercapnic and Hypoxic Respiratory failure which is Acute on chronic.  she is not on home oxygen. Supplemental oxygen was provided and noted- Oxygen Concentration (%):  [32] 32    Signs/symptoms of respiratory failure include- wheezing and dyspnea . Contributing diagnoses includes - COPD Labs and images were reviewed. Patient Has recent ABG, which has been reviewed. Will treat underlying causes and adjust management of respiratory failure as needed. See COPD

## 2024-07-09 NOTE — ASSESSMENT & PLAN NOTE
Chronic, controlled. Latest blood pressure and vitals reviewed-     Temp:  [97.9 °F (36.6 °C)-98 °F (36.7 °C)]   Pulse:  [64-87]   Resp:  [15-24]   BP: (122-200)/()   SpO2:  [93 %-100 %] .   Home meds for hypertension were reviewed and noted below.   Hypertension Medications               amLODIPine-benazepriL (LOTREL) 10-40 mg per capsule Take 1 capsule by mouth once daily.     While in the hospital, will manage blood pressure as follows; Continue home antihypertensive regimen    Will utilize p.r.n. blood pressure medication only if patient's blood pressure greater than 180/110 and she develops symptoms such as worsening chest pain or shortness of breath.

## 2024-07-09 NOTE — H&P
[facility] - Emergency Dept  [facility]  History & Physical    Patient Name: Gloria Garcia  MRN: [id number]  Patient Class: OP- Observation  Admission Date: 7/9/2024  Attending Physician: Kaitlin Ortiz MD   Primary Care Provider: Hailey Primary Doctor         Patient information was obtained from patient, past medical records, and ER records.     Subjective:     Principal Problem:COPD exacerbation    Chief Complaint:   Chief Complaint   Patient presents with    Shortness of Breath     Arrives via EMS with c/o SOB received Due neb in route and IV steroids RA currently 93% hx of asthma         HPI: Ms. Gloria Garcia is a 88 yo F w/ a pmhx of hypertension, dementia, COPD and asthma w/ presents w/ c/o shortness of breath, which began last night. Pt reports SOB began while in her home at rest. She endorses associated dry cough. She has utilized her albuterol treatments multiple times w/o significant improvement. States she does not have home O2. Denies any fever, chills, congestion, malaise, wheezing, chest pain, lower leg edema, REIS. She reports compliance w/ her home medications.     In ED, Pt is afebrile, satting 90% on room air, placed on 4L nasal cannula with improvement to %. No leukocytosis, no significant electrolyte abnormalities. Cr 1.5. BNP 49, trop 0.006. PH 7.33, CO2 53.9, PO2 17, HCO3 28.8. Covid and flu negative. CXR without acute process  Received 125 mg solumedrol and duoneb x1 per EMS. Given duoneb x2 and 1 continous albuterol in the ED.    Past Medical History:   Diagnosis Date    Asthma     COPD exacerbation 7/9/2024    Hypertension        Past Surgical History:   Procedure Laterality Date    HYSTERECTOMY         Review of patient's allergies indicates:  No Known Allergies    No current facility-administered medications on file prior to encounter.     Current Outpatient Medications on File Prior to Encounter   Medication Sig    albuterol (PROVENTIL/VENTOLIN HFA) 90 mcg/actuation inhaler  Inhale 2 puffs into the lungs every 4 (four) hours as needed for Wheezing or Shortness of Breath. Rescue    albuterol (PROVENTIL/VENTOLIN HFA) 90 mcg/actuation inhaler Inhale 1-2 puffs into the lungs every 6 (six) hours as needed for Wheezing. Rescue    amLODIPine-benazepriL (LOTREL) 10-40 mg per capsule Take 1 capsule by mouth once daily.    atorvastatin (LIPITOR) 40 MG tablet Take 40 mg by mouth once daily.     Family History       Problem Relation (Age of Onset)    Hypertension Mother    Stroke Father          Tobacco Use    Smoking status: Former     Current packs/day: 0.00     Types: Cigarettes     Quit date: 3/28/1995     Years since quittin.3    Smokeless tobacco: Never   Substance and Sexual Activity    Alcohol use: Yes     Comment: occasional/social    Drug use: Never    Sexual activity: Not Currently     Review of Systems   Constitutional:  Negative for activity change, chills, diaphoresis, fatigue and fever.   HENT:  Negative for congestion, rhinorrhea and sore throat.    Respiratory:  Positive for cough (dry) and shortness of breath. Negative for chest tightness and wheezing.    Cardiovascular:  Negative for chest pain, palpitations and leg swelling.   Gastrointestinal:  Negative for abdominal distention, abdominal pain, blood in stool, constipation, diarrhea, nausea and vomiting.   Genitourinary:  Negative for difficulty urinating, dysuria, frequency, hematuria and urgency.   Musculoskeletal:  Negative for arthralgias, back pain and neck stiffness.   Neurological:  Negative for dizziness, tremors, seizures, syncope, weakness, light-headedness, numbness and headaches.   Psychiatric/Behavioral:  Negative for agitation, confusion and hallucinations.      Objective:     Vital Signs (Most Recent):  Temp: 98 °F (36.7 °C) (24 1100)  Pulse: 74 (24 1200)  Resp: 17 (24 1200)  BP: (!) 149/78 (24 1201)  SpO2: 98 % (24 1200) Vital Signs (24h Range):  Temp:  [97.9 °F (36.6 °C)-98 °F  (36.7 °C)] 98 °F (36.7 °C)  Pulse:  [64-87] 74  Resp:  [15-24] 17  SpO2:  [93 %-100 %] 98 %  BP: (122-200)/() 149/78     Weight: 47.2 kg (104 lb)  Body mass index is 19.02 kg/m².     Physical Exam  Vitals and nursing note reviewed.   Constitutional:       General: She is not in acute distress.     Appearance: She is well-developed. She is not diaphoretic.      Interventions: Nasal cannula in place.   HENT:      Head: Normocephalic and atraumatic.      Right Ear: External ear normal.      Left Ear: External ear normal.      Nose: Nose normal. No congestion.      Mouth/Throat:      Pharynx: Oropharynx is clear.   Eyes:      General: No scleral icterus.     Extraocular Movements: Extraocular movements intact.   Cardiovascular:      Rate and Rhythm: Normal rate and regular rhythm.      Pulses: Normal pulses.      Heart sounds: Normal heart sounds. No murmur heard.  Pulmonary:      Effort: Pulmonary effort is normal. No respiratory distress.      Breath sounds: Decreased breath sounds (throughout) present. No wheezing or rales.   Abdominal:      General: Bowel sounds are normal. There is no distension.      Palpations: Abdomen is soft.      Tenderness: There is no abdominal tenderness. There is no guarding or rebound.   Musculoskeletal:      Cervical back: Normal range of motion.      Right lower leg: No edema.      Left lower leg: No edema.   Skin:     General: Skin is warm and dry.      Capillary Refill: Capillary refill takes less than 2 seconds.   Neurological:      General: No focal deficit present.      Mental Status: She is alert and oriented to person, place, and time. Mental status is at baseline.   Psychiatric:         Mood and Affect: Mood normal.         Behavior: Behavior normal.         Thought Content: Thought content normal.                Significant Labs: All pertinent labs within the past 24 hours have been reviewed.  ABGs:   Recent Labs   Lab 07/09/24  0842   PH 7.335*   PCO2 53.9*   HCO3 28.8*    POCSATURATED 20   BE 3*   PO2 17*     CBC:   Recent Labs   Lab 07/09/24  0838   WBC 5.48   HGB 11.8*   HCT 38.0        CMP:   Recent Labs   Lab 07/09/24 0838      K 4.4      CO2 22*      BUN 20   CREATININE 1.5*   CALCIUM 9.4   PROT 7.3   ALBUMIN 3.9   BILITOT 0.5   ALKPHOS 51*   AST 16   ALT 7*   ANIONGAP 11     Cardiac Markers:   Recent Labs   Lab 07/09/24  0838   BNP 49     Troponin:   Recent Labs   Lab 07/09/24 0838   TROPONINI 0.006       Significant Imaging: I have reviewed all pertinent imaging results/findings within the past 24 hours.  Imaging Results              X-Ray Chest AP Portable (Final result)  Result time 07/09/24 10:28:00      Final result by Felipe Vega MD (07/09/24 10:28:00)                   Impression:      No significant intrathoracic abnormality directly referable to the current history of dyspnea is observed.  No significant detrimental interval change in the appearance of the chest since 04/20/2024 is appreciated.      Electronically signed by: Felipe Vega MD  Date:    07/09/2024  Time:    10:28               Narrative:    EXAMINATION:  XR CHEST AP PORTABLE    CLINICAL HISTORY:  shortness of breath;    TECHNIQUE:  One view    COMPARISON:  Comparison is made to 04/20/2024.  Clinical information of shortness of breath.    FINDINGS:  Heart size is within normal limits, as is the appearance of the pulmonary vascularity.  Lung zones appear stable, and are free of significant airspace consolidation or volume loss.  No definite pulmonary parenchymal nodules.  No pleural fluid.  No abnormal mediastinal widening, with some tortuosity of the thoracic aorta again incidentally noted.  Skin folds are superimposed over both hemithoraces, but no pneumothorax is appreciated.                                     Assessment/Plan:     * COPD exacerbation  Moderate asthma w/ acute exacerbation  Patient's COPD is with exacerbation noted by continued dyspnea and worsening of  baseline hypoxia currently.  Patient is currently on COPD Pathway. Continue scheduled inhalers Steroids and Supplemental oxygen and monitor respiratory status closely.     - 4L nasal cannula with improvement to %.  - No leukocytosis, no significant electrolyte abnormalities.  - BNP 49, trop 0.006  - PH 7.33, CO2 53.9, PO2 17, HCO3 28.8.   - Covid and flu negative.  - CXR without acute process  - s/p 125 mg solumedrol x1, now prednisone 40 mg qd  - duonebs q6h and incentive spirometry  - COPD pathway  - prn O2  - 6mwt prior to dc  - continue to monitor    Acute respiratory failure with hypoxia and hypercapnia  Patient with Hypercapnic and Hypoxic Respiratory failure which is Acute on chronic.  she is not on home oxygen. Supplemental oxygen was provided and noted- Oxygen Concentration (%):  [32] 32    Signs/symptoms of respiratory failure include- wheezing and dyspnea . Contributing diagnoses includes - COPD Labs and images were reviewed. Patient Has recent ABG, which has been reviewed. Will treat underlying causes and adjust management of respiratory failure as needed. See COPD    Essential hypertension  Chronic, controlled. Latest blood pressure and vitals reviewed-     Temp:  [97.9 °F (36.6 °C)-98 °F (36.7 °C)]   Pulse:  [64-87]   Resp:  [15-24]   BP: (122-200)/()   SpO2:  [93 %-100 %] .   Home meds for hypertension were reviewed and noted below.   Hypertension Medications               amLODIPine-benazepriL (LOTREL) 10-40 mg per capsule Take 1 capsule by mouth once daily.     While in the hospital, will manage blood pressure as follows; Continue home antihypertensive regimen    Will utilize p.r.n. blood pressure medication only if patient's blood pressure greater than 180/110 and she develops symptoms such as worsening chest pain or shortness of breath.      VTE Risk Mitigation (From admission, onward)           Ordered     heparin (porcine) injection 5,000 Units  Every 8 hours         07/09/24 3139      IP VTE HIGH RISK PATIENT  Once         07/09/24 1134     Place sequential compression device  Until discontinued         07/09/24 1134                         On 07/09/2024, patient should be placed in hospital observation services under my care in collaboration with Dr. Ortiz.           Thomas Olivares PA-C  Department of Hospital Medicine  [facility] - Emergency Dept

## 2024-07-09 NOTE — SUBJECTIVE & OBJECTIVE
Past Medical History:   Diagnosis Date    Asthma     COPD exacerbation 2024    Hypertension        Past Surgical History:   Procedure Laterality Date    HYSTERECTOMY         Review of patient's allergies indicates:  No Known Allergies    No current facility-administered medications on file prior to encounter.     Current Outpatient Medications on File Prior to Encounter   Medication Sig    albuterol (PROVENTIL/VENTOLIN HFA) 90 mcg/actuation inhaler Inhale 2 puffs into the lungs every 4 (four) hours as needed for Wheezing or Shortness of Breath. Rescue    albuterol (PROVENTIL/VENTOLIN HFA) 90 mcg/actuation inhaler Inhale 1-2 puffs into the lungs every 6 (six) hours as needed for Wheezing. Rescue    amLODIPine-benazepriL (LOTREL) 10-40 mg per capsule Take 1 capsule by mouth once daily.    atorvastatin (LIPITOR) 40 MG tablet Take 40 mg by mouth once daily.     Family History       Problem Relation (Age of Onset)    Hypertension Mother    Stroke Father          Tobacco Use    Smoking status: Former     Current packs/day: 0.00     Types: Cigarettes     Quit date: 3/28/1995     Years since quittin.3    Smokeless tobacco: Never   Substance and Sexual Activity    Alcohol use: Yes     Comment: occasional/social    Drug use: Never    Sexual activity: Not Currently     Review of Systems   Constitutional:  Negative for activity change, chills, diaphoresis, fatigue and fever.   HENT:  Negative for congestion, rhinorrhea and sore throat.    Respiratory:  Positive for cough (dry) and shortness of breath. Negative for chest tightness and wheezing.    Cardiovascular:  Negative for chest pain, palpitations and leg swelling.   Gastrointestinal:  Negative for abdominal distention, abdominal pain, blood in stool, constipation, diarrhea, nausea and vomiting.   Genitourinary:  Negative for difficulty urinating, dysuria, frequency, hematuria and urgency.   Musculoskeletal:  Negative for arthralgias, back pain and neck stiffness.    Neurological:  Negative for dizziness, tremors, seizures, syncope, weakness, light-headedness, numbness and headaches.   Psychiatric/Behavioral:  Negative for agitation, confusion and hallucinations.      Objective:     Vital Signs (Most Recent):  Temp: 98 °F (36.7 °C) (07/09/24 1100)  Pulse: 74 (07/09/24 1200)  Resp: 17 (07/09/24 1200)  BP: (!) 149/78 (07/09/24 1201)  SpO2: 98 % (07/09/24 1200) Vital Signs (24h Range):  Temp:  [97.9 °F (36.6 °C)-98 °F (36.7 °C)] 98 °F (36.7 °C)  Pulse:  [64-87] 74  Resp:  [15-24] 17  SpO2:  [93 %-100 %] 98 %  BP: (122-200)/() 149/78     Weight: 47.2 kg (104 lb)  Body mass index is 19.02 kg/m².     Physical Exam  Vitals and nursing note reviewed.   Constitutional:       General: She is not in acute distress.     Appearance: She is well-developed. She is not diaphoretic.      Interventions: Nasal cannula in place.   HENT:      Head: Normocephalic and atraumatic.      Right Ear: External ear normal.      Left Ear: External ear normal.      Nose: Nose normal. No congestion.      Mouth/Throat:      Pharynx: Oropharynx is clear.   Eyes:      General: No scleral icterus.     Extraocular Movements: Extraocular movements intact.   Cardiovascular:      Rate and Rhythm: Normal rate and regular rhythm.      Pulses: Normal pulses.      Heart sounds: Normal heart sounds. No murmur heard.  Pulmonary:      Effort: Pulmonary effort is normal. No respiratory distress.      Breath sounds: Decreased breath sounds (throughout) present. No wheezing or rales.   Abdominal:      General: Bowel sounds are normal. There is no distension.      Palpations: Abdomen is soft.      Tenderness: There is no abdominal tenderness. There is no guarding or rebound.   Musculoskeletal:      Cervical back: Normal range of motion.      Right lower leg: No edema.      Left lower leg: No edema.   Skin:     General: Skin is warm and dry.      Capillary Refill: Capillary refill takes less than 2 seconds.   Neurological:       General: No focal deficit present.      Mental Status: She is alert and oriented to person, place, and time. Mental status is at baseline.   Psychiatric:         Mood and Affect: Mood normal.         Behavior: Behavior normal.         Thought Content: Thought content normal.                Significant Labs: All pertinent labs within the past 24 hours have been reviewed.  ABGs:   Recent Labs   Lab 07/09/24  0842   PH 7.335*   PCO2 53.9*   HCO3 28.8*   POCSATURATED 20   BE 3*   PO2 17*     CBC:   Recent Labs   Lab 07/09/24  0838   WBC 5.48   HGB 11.8*   HCT 38.0        CMP:   Recent Labs   Lab 07/09/24  0838      K 4.4      CO2 22*      BUN 20   CREATININE 1.5*   CALCIUM 9.4   PROT 7.3   ALBUMIN 3.9   BILITOT 0.5   ALKPHOS 51*   AST 16   ALT 7*   ANIONGAP 11     Cardiac Markers:   Recent Labs   Lab 07/09/24  0838   BNP 49     Troponin:   Recent Labs   Lab 07/09/24  0838   TROPONINI 0.006       Significant Imaging: I have reviewed all pertinent imaging results/findings within the past 24 hours.  Imaging Results              X-Ray Chest AP Portable (Final result)  Result time 07/09/24 10:28:00      Final result by Felipe Vega MD (07/09/24 10:28:00)                   Impression:      No significant intrathoracic abnormality directly referable to the current history of dyspnea is observed.  No significant detrimental interval change in the appearance of the chest since 04/20/2024 is appreciated.      Electronically signed by: Felipe Vega MD  Date:    07/09/2024  Time:    10:28               Narrative:    EXAMINATION:  XR CHEST AP PORTABLE    CLINICAL HISTORY:  shortness of breath;    TECHNIQUE:  One view    COMPARISON:  Comparison is made to 04/20/2024.  Clinical information of shortness of breath.    FINDINGS:  Heart size is within normal limits, as is the appearance of the pulmonary vascularity.  Lung zones appear stable, and are free of significant airspace consolidation or volume loss.   No definite pulmonary parenchymal nodules.  No pleural fluid.  No abnormal mediastinal widening, with some tortuosity of the thoracic aorta again incidentally noted.  Skin folds are superimposed over both hemithoraces, but no pneumothorax is appreciated.

## 2024-07-10 ENCOUNTER — TELEPHONE (OUTPATIENT)
Dept: PULMONOLOGY | Facility: CLINIC | Age: 89
End: 2024-07-10
Payer: MEDICARE

## 2024-07-10 VITALS
BODY MASS INDEX: 36.1 KG/M2 | SYSTOLIC BLOOD PRESSURE: 154 MMHG | OXYGEN SATURATION: 94 % | HEART RATE: 89 BPM | HEIGHT: 62 IN | DIASTOLIC BLOOD PRESSURE: 72 MMHG | RESPIRATION RATE: 16 BRPM | WEIGHT: 196.19 LBS | TEMPERATURE: 98 F

## 2024-07-10 DIAGNOSIS — J44.9 CHRONIC OBSTRUCTIVE PULMONARY DISEASE, UNSPECIFIED COPD TYPE: Primary | ICD-10-CM

## 2024-07-10 PROBLEM — I16.0 HYPERTENSIVE URGENCY: Status: ACTIVE | Noted: 2024-07-10

## 2024-07-10 LAB
ANION GAP SERPL CALC-SCNC: 10 MMOL/L (ref 8–16)
BASOPHILS # BLD AUTO: 0 K/UL (ref 0–0.2)
BASOPHILS NFR BLD: 0 % (ref 0–1.9)
BUN SERPL-MCNC: 24 MG/DL (ref 8–23)
CALCIUM SERPL-MCNC: 9.4 MG/DL (ref 8.7–10.5)
CHLORIDE SERPL-SCNC: 107 MMOL/L (ref 95–110)
CO2 SERPL-SCNC: 21 MMOL/L (ref 23–29)
CREAT SERPL-MCNC: 1.4 MG/DL (ref 0.5–1.4)
DIFFERENTIAL METHOD BLD: ABNORMAL
EOSINOPHIL # BLD AUTO: 0 K/UL (ref 0–0.5)
EOSINOPHIL NFR BLD: 0 % (ref 0–8)
ERYTHROCYTE [DISTWIDTH] IN BLOOD BY AUTOMATED COUNT: 13.2 % (ref 11.5–14.5)
EST. GFR  (NO RACE VARIABLE): 36.4 ML/MIN/1.73 M^2
GLUCOSE SERPL-MCNC: 133 MG/DL (ref 70–110)
HCT VFR BLD AUTO: 32.4 % (ref 37–48.5)
HGB BLD-MCNC: 10.1 G/DL (ref 12–16)
IMM GRANULOCYTES # BLD AUTO: 0.03 K/UL (ref 0–0.04)
IMM GRANULOCYTES NFR BLD AUTO: 0.5 % (ref 0–0.5)
LYMPHOCYTES # BLD AUTO: 0.6 K/UL (ref 1–4.8)
LYMPHOCYTES NFR BLD: 9 % (ref 18–48)
MAGNESIUM SERPL-MCNC: 2.1 MG/DL (ref 1.6–2.6)
MCH RBC QN AUTO: 27.8 PG (ref 27–31)
MCHC RBC AUTO-ENTMCNC: 31.2 G/DL (ref 32–36)
MCV RBC AUTO: 89 FL (ref 82–98)
MONOCYTES # BLD AUTO: 0.2 K/UL (ref 0.3–1)
MONOCYTES NFR BLD: 3 % (ref 4–15)
NEUTROPHILS # BLD AUTO: 5.6 K/UL (ref 1.8–7.7)
NEUTROPHILS NFR BLD: 87.5 % (ref 38–73)
NRBC BLD-RTO: 0 /100 WBC
PHOSPHATE SERPL-MCNC: 3.2 MG/DL (ref 2.7–4.5)
PLATELET # BLD AUTO: 285 K/UL (ref 150–450)
PMV BLD AUTO: 10 FL (ref 9.2–12.9)
POTASSIUM SERPL-SCNC: 4.2 MMOL/L (ref 3.5–5.1)
RBC # BLD AUTO: 3.63 M/UL (ref 4–5.4)
SODIUM SERPL-SCNC: 138 MMOL/L (ref 136–145)
WBC # BLD AUTO: 6.34 K/UL (ref 3.9–12.7)

## 2024-07-10 PROCEDURE — 25000242 PHARM REV CODE 250 ALT 637 W/ HCPCS: Performed by: PHYSICIAN ASSISTANT

## 2024-07-10 PROCEDURE — G0378 HOSPITAL OBSERVATION PER HR: HCPCS

## 2024-07-10 PROCEDURE — 94640 AIRWAY INHALATION TREATMENT: CPT | Mod: XB

## 2024-07-10 PROCEDURE — 84100 ASSAY OF PHOSPHORUS: CPT | Performed by: PHYSICIAN ASSISTANT

## 2024-07-10 PROCEDURE — 83735 ASSAY OF MAGNESIUM: CPT | Performed by: PHYSICIAN ASSISTANT

## 2024-07-10 PROCEDURE — 25000003 PHARM REV CODE 250: Performed by: HOSPITALIST

## 2024-07-10 PROCEDURE — 63600175 PHARM REV CODE 636 W HCPCS: Performed by: PHYSICIAN ASSISTANT

## 2024-07-10 PROCEDURE — 80048 BASIC METABOLIC PNL TOTAL CA: CPT | Performed by: PHYSICIAN ASSISTANT

## 2024-07-10 PROCEDURE — 96372 THER/PROPH/DIAG INJ SC/IM: CPT | Performed by: PHYSICIAN ASSISTANT

## 2024-07-10 PROCEDURE — 94761 N-INVAS EAR/PLS OXIMETRY MLT: CPT

## 2024-07-10 PROCEDURE — 27000221 HC OXYGEN, UP TO 24 HOURS

## 2024-07-10 PROCEDURE — 25000003 PHARM REV CODE 250: Performed by: PHYSICIAN ASSISTANT

## 2024-07-10 PROCEDURE — 36415 COLL VENOUS BLD VENIPUNCTURE: CPT | Performed by: PHYSICIAN ASSISTANT

## 2024-07-10 PROCEDURE — 99900035 HC TECH TIME PER 15 MIN (STAT)

## 2024-07-10 PROCEDURE — 85025 COMPLETE CBC W/AUTO DIFF WBC: CPT | Performed by: PHYSICIAN ASSISTANT

## 2024-07-10 RX ORDER — PREDNISONE 20 MG/1
40 TABLET ORAL DAILY
Qty: 6 TABLET | Refills: 0 | Status: SHIPPED | OUTPATIENT
Start: 2024-07-11 | End: 2024-07-14

## 2024-07-10 RX ORDER — FLUTICASONE PROPIONATE AND SALMETEROL 250; 50 UG/1; UG/1
1 POWDER RESPIRATORY (INHALATION) DAILY
COMMUNITY
Start: 2024-03-21

## 2024-07-10 RX ORDER — CHLORTHALIDONE 25 MG/1
25 TABLET ORAL DAILY
COMMUNITY
Start: 2024-04-26

## 2024-07-10 RX ORDER — LISINOPRIL 20 MG/1
20 TABLET ORAL DAILY
Status: DISCONTINUED | OUTPATIENT
Start: 2024-07-10 | End: 2024-07-10 | Stop reason: HOSPADM

## 2024-07-10 RX ADMIN — AMLODIPINE BESYLATE 10 MG: 10 TABLET ORAL at 09:07

## 2024-07-10 RX ADMIN — HEPARIN SODIUM 5000 UNITS: 5000 INJECTION INTRAVENOUS; SUBCUTANEOUS at 05:07

## 2024-07-10 RX ADMIN — PREDNISONE 40 MG: 20 TABLET ORAL at 09:07

## 2024-07-10 RX ADMIN — IPRATROPIUM BROMIDE AND ALBUTEROL SULFATE 3 ML: 2.5; .5 SOLUTION RESPIRATORY (INHALATION) at 10:07

## 2024-07-10 RX ADMIN — ATORVASTATIN CALCIUM 40 MG: 40 TABLET, FILM COATED ORAL at 09:07

## 2024-07-10 RX ADMIN — LISINOPRIL 20 MG: 20 TABLET ORAL at 09:07

## 2024-07-10 NOTE — PLAN OF CARE
Problem: Adult Inpatient Plan of Care  Goal: Plan of Care Review  Outcome: Progressing     Problem: COPD (Chronic Obstructive Pulmonary Disease)  Goal: Optimal Chronic Illness Coping  Outcome: Progressing     Problem: Fall Injury Risk  Goal: Absence of Fall and Fall-Related Injury  Outcome: Progressing

## 2024-07-10 NOTE — PLAN OF CARE
[facility] - Observation 11H  Initial Discharge Assessment       Primary Care Provider: Hailey, Primary Doctor    Admission Diagnosis: Shortness of breath [R06.02]  COPD exacerbation [J44.1]  Chest pain [R07.9]    Admission Date: 7/9/2024  Expected Discharge Date: 7/10/2024    Transition of Care Barriers: None    Payor: [employer] MANAGED MEDICARE / Plan: [employer] MEDICARE HMO / Product Type: Capitation /     Extended Emergency Contact Information  Primary Emergency Contact: Meseret Christensen  Mobile Phone: [phone]  Relation: Relative  Secondary Emergency Contact: Lynn Christensen  Mobile Phone: [phone]  Relation: Relative    Discharge Plan A: Home with family  Discharge Plan B: Home      [id number] - [place], [place] - [address] [facility]  [address]  [facility] [address]  Phone: [phone] Fax: [phone]      Initial Assessment (most recent)       Adult Discharge Assessment - 07/10/24 0856          Discharge Assessment    Assessment Type Discharge Planning Assessment     Confirmed/corrected address, phone number and insurance Yes     Confirmed Demographics Correct on Facesheet     Source of Information patient;family     If unable to respond/provide information was family/caregiver contacted? Yes     Contact Name/Number Daughter: Lynn Christensen [phone]     Communicated DOTTIE with patient/caregiver Date not available/Unable to determine     Reason For Admission COPD exacerbation     People in Home child(chase), adult;sibling(s);friend(s)     Do you expect to return to your current living situation? Yes     Do you have help at home or someone to help you manage your care at home? Yes     Who are your caregiver(s) and their phone number(s)? Daughter: Lynn Christensen [phone]     Prior to hospitilization cognitive status: Alert/Oriented     Current cognitive status: Alert/Oriented     Walking or Climbing Stairs Difficulty no     Dressing/Bathing  Difficulty no     Equipment Currently Used at Home rollator;shower chair;bedside commode;nebulizer     Patient currently being followed by outpatient case management? No     Do you currently have service(s) that help you manage your care at home? No     Do you take prescription medications? Yes     Do you have prescription coverage? Yes     Coverage [employer]     Do you have any problems affording any of your prescribed medications? No     Is the patient taking medications as prescribed? yes     Who is going to help you get home at discharge? Daughter: Lynn Christensen [phone]     How do you get to doctors appointments? family or friend will provide     Are you on dialysis? No     Do you take coumadin? No     Discharge Plan A Home with family     Discharge Plan B Home     DME Needed Upon Discharge  none     Discharge Plan discussed with: Patient;Adult children     Transition of Care Barriers None                   SW met with the patient at the bedside and spoke to daughterShaun Bailey via ophone to discuss the discharge plan. Gave her the discharge booklet and placed contact numbers on the white board in the room. Patient alert and sitting up in bed.  Patient verified her name , , PCP, Insurance and Pharmacy . Stated she lives with daughter, sister, and friend and has 1 step to point of entry . Prior to admission patient was independent with all ADLS and wasn't receiving any HH services. She is not on coumadin nor is she a dialysis patient . DME's include:Rollator, shower chair, BSC, and nebulizer that daughter reports of using 3x/daily PRN.  She takes  medication as prescribed and has no problems getting  medication. Family will provide transportation at [place].      RAMESH Ward  Department of Case Management   [facility] [address] [place] [address]  Phone : [phone]      Discharge Plan A and Plan B have been determined by review of patient's clinical status, future medical and  therapeutic needs, and coverage/benefits for post-acute care in coordination with multidisciplinary team members.

## 2024-07-10 NOTE — DISCHARGE SUMMARY
[facility] [address] [place] [address] Medicine  Discharge Summary      Patient Name: Gloria Garcia  MRN: [id number]  SABI: [id number]  Patient Class: OP- Observation  Admission Date: 7/9/2024  Hospital Length of Stay: 0 days  Discharge Date and Time: 7/10/2024  2:00 PM  Attending Physician: Hailey att. providers found   Discharging Provider: Sharyn Deluna PA-C  Primary Care Provider: Hailey Primary Doctor  [facility] Medicine Team: [facility] F Sharyn Deluna PA-C  Primary Care Team: [facility]    HPI:   Ms. Gloria Garcia is a 89 yo F w/ a pmhx of hypertension, dementia, COPD and asthma w/ presents w/ c/o shortness of breath, which began last night. Pt reports SOB began while in her home at rest. She endorses associated dry cough. She has utilized her albuterol treatments multiple times w/o significant improvement. States she does not have home O2. Denies any fever, chills, congestion, malaise, wheezing, chest pain, lower leg edema, REIS. She reports compliance w/ her home medications.     In ED, Pt is afebrile, satting 90% on room air, placed on 4L nasal cannula with improvement to %. No leukocytosis, no significant electrolyte abnormalities. Cr 1.5. BNP 49, trop 0.006. PH 7.33, CO2 53.9, PO2 17, HCO3 28.8. Covid and flu negative. CXR without acute process  Received 125 mg solumedrol and duoneb x1 per EMS. Given duoneb x2 and 1 continous albuterol in the ED.    * No surgery found *      Hospital Course:   Gloria Garcia is a 89 y.o. F who was admitted to  for further evaluation of COPD exacerbation. Required 4L O2 on admission. Initiated scheduled duonebs, steroids, other supportive treatment. 6MWT performed: patient passed, O2sat @ 89% with exertion.  Patient medically ready for discharge. Sent with rx for continued steroids. Plan to follow up with PCP, pulmonology. Return precautions provided. Patient was seen and assessed on day of discharge. Plan of care discussed with patient, patient agreeable with  plan, and all questions answered.    Physical Exam  Gen: in NAD, appears stated age  Neuro: mental status at baseline, motor, sensory, and strength grossly intact BL  HEENT: EOMI, PERRLA; no JVD appreciated  CVS: RRR, no m/r/g  Resp: lungs CTAB, no w/r/r; no belabored breathing or accessory muscle use appreciated   Abd: NTND, soft to palpation  Extrem: no UE or LE edema BL         Goals of Care Treatment Preferences:  Code Status: Full Code      Consults:   Consults (From admission, onward)          Status Ordering Provider     COPD PharmD consult  Once        Provider:  (Not yet assigned)    Acknowledged HILARY AUSTIN            No new Assessment & Plan notes have been filed under this hospital service since the last note was generated.  Service: Hospital Medicine    Final Active Diagnoses:    Diagnosis Date Noted POA    PRINCIPAL PROBLEM:  COPD exacerbation [J44.1] 07/09/2024 Yes    Hypertensive urgency [I16.0] 07/10/2024 Yes    Acute respiratory failure with hypoxia and hypercapnia [J96.01, J96.02] 07/09/2024 Yes    Essential hypertension [I10] 03/28/2020 Yes     Chronic    Moderate asthma with acute exacerbation [J45.901] 03/28/2020 Yes      Problems Resolved During this Admission:       Discharged Condition: good    Disposition: Home or Self Care    Follow Up:    Patient Instructions:      Ambulatory referral/consult to Pulmonology   Standing Status: Future   Referral Priority: Routine Referral Type: Consultation   Referral Reason: Specialty Services Required   Requested Specialty: Pulmonary Disease   Number of Visits Requested: 1     Ambulatory referral/consult to Internal Medicine   Standing Status: Future   Referral Priority: Routine Referral Type: Consultation   Referral Reason: Specialty Services Required   Requested Specialty: Internal Medicine   Number of Visits Requested: 1     Notify your health care provider if you experience any of the following:  temperature >100.4     Notify your health  care provider if you experience any of the following:  difficulty breathing or increased cough     Notify your health care provider if you experience any of the following:  increased confusion or weakness     Activity as tolerated       Significant Diagnostic Studies: N/A    Pending Diagnostic Studies:       None           Medications:  Reconciled Home Medications:      Medication List        START taking these medications      predniSONE 20 MG tablet  Commonly known as: DELTASONE  Take 2 tablets (40 mg total) by mouth once daily. for 3 days  Start taking on: July 11, 2024            CONTINUE taking these medications      acetaminophen 500 MG tablet  Commonly known as: TYLENOL  Take 500 mg by mouth 2 (two) times daily as needed for Pain.     albuterol 90 mcg/actuation inhaler  Commonly known as: PROVENTIL/VENTOLIN HFA  Inhale 1-2 puffs into the lungs every 6 (six) hours as needed for Wheezing. Rescue     amlodipine-benazepril 10-20mg 10-20 mg per capsule  Commonly known as: LOTREL  Take 1 capsule by mouth every morning.     ARICEPT 5 mg tablet  Generic drug: donepeziL  Take 5 mg by mouth every evening.     ARTHRITIS TOP  Apply topically daily as needed (Pain).     atorvastatin 10 MG tablet  Commonly known as: LIPITOR  Take 10 mg by mouth once daily.     chlorthalidone 25 MG Tab  Commonly known as: HYGROTEN  Take 25 mg by mouth once daily.     VITAMIN B-12 ORAL  Take 1 tablet by mouth once daily.     WIXELA INHUB 250-50 mcg/dose diskus inhaler  Generic drug: fluticasone-salmeterol 250-50 mcg/dose  Inhale 1 puff into the lungs Daily.              Indwelling Lines/Drains at time of discharge:   Lines/Drains/Airways       None                   Time spent on the discharge of patient: 36 minutes         Sharyn Deluna PA-C  Department of Hospital Medicine  [facility]

## 2024-07-10 NOTE — HOSPITAL COURSE
Gloria Garcia is a 89 y.o. F who was admitted to  for further evaluation of COPD exacerbation. Required 4L O2 on admission. Initiated scheduled duonebs, steroids, other supportive treatment. 6MWT performed: patient passed, O2sat @ 89% with exertion.  Patient medically ready for discharge. Sent with rx for continued steroids. Plan to follow up with PCP, pulmonology. Return precautions provided. Patient was seen and assessed on day of discharge. Plan of care discussed with patient, patient agreeable with plan, and all questions answered.    Physical Exam  Gen: in NAD, appears stated age  Neuro: mental status at baseline, motor, sensory, and strength grossly intact BL  HEENT: EOMI, PERRLA; no JVD appreciated  CVS: RRR, no m/r/g  Resp: lungs CTAB, no w/r/r; no belabored breathing or accessory muscle use appreciated   Abd: NTND, soft to palpation  Extrem: no UE or LE edema BL

## 2024-07-10 NOTE — NURSING
Home Oxygen Evaluation    Date Performed: 7/10/2024    1) Patient's Home O2 Sat on room air, while at rest: 91%        If O2 sats on room air at rest are 88% or below, patient qualifies. No additional testing needed. Document N/A in steps 2 and 3. If 89% or above, complete steps 2.      2) Patient's O2 Sat on room air while exercisin%        If O2 sats on room air while exercising remain 89% or above patient does not qualify, no further testing needed Document N/A in step 3. If O2 sats on room air while exercising are 88% or below, continue to step 3.      3) Patient's O2 Sat while exercising on O2:n/a at n/a         (Must show improvement from #2 for patients to qualify)    If O2 sats improve on oxygen, patient qualifies for portable oxygen. If not, the patient does not qualify.

## 2024-07-10 NOTE — PLAN OF CARE
07/10/24 1438   Final Note   Assessment Type Final Discharge Note   Anticipated Discharge Disposition Home   Hospital Resources/Appts/Education Provided Provided patient/caregiver with written discharge plan information   Post-Acute Status   Patient choice form signed by patient/caregiver List with quality metrics by geographic area provided   Discharge Delays None known at this time     [facility]  Discharge Final Note    Primary Care Provider: Hailey, Primary Doctor    Expected Discharge Date: 7/10/2024    Patient cleared for discharge from case management standpoint.     Discharge Plan A and Plan B have been determined by review of patient's clinical status, future medical and therapeutic needs, and coverage/benefits for post-acute care in coordination with multidisciplinary team members.        Final Discharge Note (most recent)       Final Note - 07/10/24 1438          Final Note    Assessment Type Final Discharge Note (P)      Anticipated Discharge Disposition Home or Self Care (P)      Hospital Resources/Appts/Education Provided Provided patient/caregiver with written discharge plan information (P)         Post-Acute Status    Patient choice form signed by patient/caregiver List with quality metrics by geographic area provided (P)      Discharge Delays None known at this time (P)                      Important Message from Medicare                 Future Appointments   Date Time Provider Department Center   7/11/2024  3:00 PM PULMONARY FUNCTION [facility]   7/11/2024  3:15 PM PULMONARY FUNCTION [facility]   7/11/2024  3:30 PM PULMONARY FUNCTION [facility]   7/11/2024  4:00 PM Silvio Curtis MD [facility]        scheduled post-discharge follow-up appointment and information added to AVS.     Monique Paul, RAMESH  [facility]  Ext. [phone]

## 2024-07-10 NOTE — PLAN OF CARE
Problem: Adult Inpatient Plan of Care  Goal: Plan of Care Review  Outcome: Met  Goal: Patient-Specific Goal (Individualized)  Outcome: Met  Goal: Absence of Hospital-Acquired Illness or Injury  Outcome: Met  Goal: Optimal Comfort and Wellbeing  Outcome: Met  Goal: Readiness for Transition of Care  Outcome: Met     Problem: COPD (Chronic Obstructive Pulmonary Disease)  Goal: Optimal Chronic Illness Coping  Outcome: Met  Goal: Optimal Level of Functional [place]  Outcome: Met  Goal: Absence of Infection Signs and Symptoms  Outcome: Met  Goal: Improved Oral Intake  Outcome: Met  Goal: Effective Oxygenation and Ventilation  Outcome: Met     Problem: Infection  Goal: Absence of Infection Signs and Symptoms  Outcome: Met     Problem: Fall Injury Risk  Goal: Absence of Fall and Fall-Related Injury  Outcome: Met   Pt discharged, AVS given,education completed. Pt verbalized understanding. All questions and concerns were met. Awaiting bedside delivery, pt received

## 2024-07-11 ENCOUNTER — PATIENT OUTREACH (OUTPATIENT)
Dept: ADMINISTRATIVE | Facility: CLINIC | Age: 89
End: 2024-07-11
Payer: MEDICARE

## 2024-07-11 NOTE — PROGRESS NOTES
C3 nurse spoke with Gloria Garcia  for a TCC post hospital discharge follow up call. The patient reports does not have a scheduled HOSFU appointment. C3 nurse was unable to schedule HOSFU appointment [facility]. Patient advised to contact their PCP to schedule a HOSPFU within 5-7 days.

## 2024-07-25 ENCOUNTER — HOSPITAL ENCOUNTER (EMERGENCY)
Facility: OTHER | Age: 89
Discharge: HOME | End: 2024-07-26
Attending: EMERGENCY MEDICINE
Payer: MEDICARE

## 2024-07-25 DIAGNOSIS — R03.0 ELEVATED BLOOD PRESSURE READING: ICD-10-CM

## 2024-07-25 DIAGNOSIS — I10 HYPERTENSION, UNSPECIFIED TYPE: Primary | ICD-10-CM

## 2024-07-25 LAB
ALBUMIN SERPL BCP-MCNC: 4 G/DL (ref 3.5–5.2)
ALP SERPL-CCNC: 47 U/L (ref 55–135)
ALT SERPL W/O P-5'-P-CCNC: 11 U/L (ref 10–44)
ANION GAP SERPL CALC-SCNC: 12 MMOL/L (ref 8–16)
AST SERPL-CCNC: 22 U/L (ref 10–40)
BACTERIA #/AREA URNS HPF: ABNORMAL /HPF
BASOPHILS # BLD AUTO: 0.05 K/UL (ref 0–0.2)
BASOPHILS NFR BLD: 1.2 % (ref 0–1.9)
BILIRUB SERPL-MCNC: 0.6 MG/DL (ref 0.1–1)
BILIRUB UR QL STRIP: NEGATIVE
BUN SERPL-MCNC: 25 MG/DL (ref 8–23)
CALCIUM SERPL-MCNC: 9.8 MG/DL (ref 8.7–10.5)
CHLORIDE SERPL-SCNC: 106 MMOL/L (ref 95–110)
CLARITY UR: CLEAR
CO2 SERPL-SCNC: 19 MMOL/L (ref 23–29)
COLOR UR: YELLOW
CREAT SERPL-MCNC: 1.5 MG/DL (ref 0.5–1.4)
DIFFERENTIAL METHOD BLD: NORMAL
EOSINOPHIL # BLD AUTO: 0.2 K/UL (ref 0–0.5)
EOSINOPHIL NFR BLD: 4.4 % (ref 0–8)
ERYTHROCYTE [DISTWIDTH] IN BLOOD BY AUTOMATED COUNT: 13.9 % (ref 11.5–14.5)
EST. GFR  (NO RACE VARIABLE): 34 ML/MIN/1.73 M^2
GLUCOSE SERPL-MCNC: 86 MG/DL (ref 70–110)
GLUCOSE UR QL STRIP: NEGATIVE
HCT VFR BLD AUTO: 37.9 % (ref 37–48.5)
HGB BLD-MCNC: 12.3 G/DL (ref 12–16)
HGB UR QL STRIP: NEGATIVE
HYALINE CASTS #/AREA URNS LPF: 9 /LPF
IMM GRANULOCYTES # BLD AUTO: 0.01 K/UL (ref 0–0.04)
IMM GRANULOCYTES NFR BLD AUTO: 0.2 % (ref 0–0.5)
KETONES UR QL STRIP: NEGATIVE
LEUKOCYTE ESTERASE UR QL STRIP: NEGATIVE
LYMPHOCYTES # BLD AUTO: 1.4 K/UL (ref 1–4.8)
LYMPHOCYTES NFR BLD: 31.6 % (ref 18–48)
MCH RBC QN AUTO: 28.6 PG (ref 27–31)
MCHC RBC AUTO-ENTMCNC: 32.5 G/DL (ref 32–36)
MCV RBC AUTO: 88 FL (ref 82–98)
MICROSCOPIC COMMENT: ABNORMAL
MONOCYTES # BLD AUTO: 0.3 K/UL (ref 0.3–1)
MONOCYTES NFR BLD: 7.8 % (ref 4–15)
NEUTROPHILS # BLD AUTO: 2.4 K/UL (ref 1.8–7.7)
NEUTROPHILS NFR BLD: 54.8 % (ref 38–73)
NITRITE UR QL STRIP: NEGATIVE
NRBC BLD-RTO: 0 /100 WBC
PH UR STRIP: 6 [PH] (ref 5–8)
PLATELET # BLD AUTO: 250 K/UL (ref 150–450)
PMV BLD AUTO: 9.8 FL (ref 9.2–12.9)
POTASSIUM SERPL-SCNC: 5.2 MMOL/L (ref 3.5–5.1)
PROT SERPL-MCNC: 7.5 G/DL (ref 6–8.4)
PROT UR QL STRIP: ABNORMAL
RBC # BLD AUTO: 4.3 M/UL (ref 4–5.4)
RBC #/AREA URNS HPF: 2 /HPF (ref 0–4)
SODIUM SERPL-SCNC: 137 MMOL/L (ref 136–145)
SP GR UR STRIP: 1.02 (ref 1–1.03)
SQUAMOUS #/AREA URNS HPF: 0 /HPF
URN SPEC COLLECT METH UR: ABNORMAL
UROBILINOGEN UR STRIP-ACNC: ABNORMAL EU/DL
WBC # BLD AUTO: 4.34 K/UL (ref 3.9–12.7)
WBC #/AREA URNS HPF: 6 /HPF (ref 0–5)

## 2024-07-25 PROCEDURE — 93010 ELECTROCARDIOGRAM REPORT: CPT | Mod: ,,, | Performed by: INTERNAL MEDICINE

## 2024-07-25 PROCEDURE — 99284 EMERGENCY DEPT VISIT MOD MDM: CPT | Mod: 25

## 2024-07-25 PROCEDURE — 80053 COMPREHEN METABOLIC PANEL: CPT | Performed by: EMERGENCY MEDICINE

## 2024-07-25 PROCEDURE — 93005 ELECTROCARDIOGRAM TRACING: CPT

## 2024-07-25 PROCEDURE — 85025 COMPLETE CBC W/AUTO DIFF WBC: CPT | Performed by: EMERGENCY MEDICINE

## 2024-07-25 PROCEDURE — 81000 URINALYSIS NONAUTO W/SCOPE: CPT | Performed by: EMERGENCY MEDICINE

## 2024-07-26 VITALS
BODY MASS INDEX: 29.7 KG/M2 | HEART RATE: 70 BPM | DIASTOLIC BLOOD PRESSURE: 79 MMHG | HEIGHT: 68 IN | OXYGEN SATURATION: 96 % | WEIGHT: 196 LBS | SYSTOLIC BLOOD PRESSURE: 163 MMHG | TEMPERATURE: 98 F | RESPIRATION RATE: 19 BRPM

## 2024-07-26 LAB
OHS QRS DURATION: 62 MS
OHS QTC CALCULATION: 447 MS

## 2024-07-26 NOTE — ED NOTES
Patient ambulated with steady gait and assisted by Moose Tracy. Patient denies any shortness of breath and dizziness. Notified MD Ramez.

## 2024-07-26 NOTE — ED NOTES
Gloria Garcia, an 89 y.o. female was brought by EMS to the ED due to High blood pressure, headache, and body weakness. Patient denies any complaints at this time.     Patient is conscious, alert, oriented to person, place, situation however, disoriented to time and year. ED workup in progress. Safety measures in place; side rails up x2. Call light within pt reach. Plan of care ongoing.    Chief Complaint   Patient presents with    Hypertension     Per EMS pt reports hx of HTN and has not taken meds today. +HA, +weakness

## 2024-10-06 ENCOUNTER — HOSPITAL ENCOUNTER (EMERGENCY)
Facility: OTHER | Age: 89
Discharge: HOME | End: 2024-10-06
Attending: EMERGENCY MEDICINE
Payer: MEDICARE

## 2024-10-06 VITALS
WEIGHT: 110 LBS | HEIGHT: 64 IN | BODY MASS INDEX: 18.78 KG/M2 | HEART RATE: 98 BPM | DIASTOLIC BLOOD PRESSURE: 79 MMHG | OXYGEN SATURATION: 97 % | RESPIRATION RATE: 18 BRPM | TEMPERATURE: 98 F | SYSTOLIC BLOOD PRESSURE: 173 MMHG

## 2024-10-06 DIAGNOSIS — R06.00 DYSPNEA: ICD-10-CM

## 2024-10-06 DIAGNOSIS — J44.1 COPD EXACERBATION: Primary | ICD-10-CM

## 2024-10-06 LAB
ANION GAP SERPL CALC-SCNC: 12 MMOL/L (ref 8–16)
BASOPHILS # BLD AUTO: 0.05 K/UL (ref 0–0.2)
BASOPHILS NFR BLD: 1.3 % (ref 0–1.9)
BNP SERPL-MCNC: 80 PG/ML (ref 0–99)
BUN SERPL-MCNC: 20 MG/DL (ref 8–23)
CALCIUM SERPL-MCNC: 9.4 MG/DL (ref 8.7–10.5)
CHLORIDE SERPL-SCNC: 109 MMOL/L (ref 95–110)
CO2 SERPL-SCNC: 22 MMOL/L (ref 23–29)
CREAT SERPL-MCNC: 1.2 MG/DL (ref 0.5–1.4)
CTP QC/QA: YES
D DIMER PPP IA.FEU-MCNC: 0.81 MG/L FEU
DIFFERENTIAL METHOD BLD: ABNORMAL
EOSINOPHIL # BLD AUTO: 0.3 K/UL (ref 0–0.5)
EOSINOPHIL NFR BLD: 6.3 % (ref 0–8)
ERYTHROCYTE [DISTWIDTH] IN BLOOD BY AUTOMATED COUNT: 13.8 % (ref 11.5–14.5)
EST. GFR  (NO RACE VARIABLE): 44 ML/MIN/1.73 M^2
GLUCOSE SERPL-MCNC: 87 MG/DL (ref 70–110)
HCT VFR BLD AUTO: 34.4 % (ref 37–48.5)
HGB BLD-MCNC: 10.5 G/DL (ref 12–16)
IMM GRANULOCYTES # BLD AUTO: 0.01 K/UL (ref 0–0.04)
IMM GRANULOCYTES NFR BLD AUTO: 0.3 % (ref 0–0.5)
LYMPHOCYTES # BLD AUTO: 1 K/UL (ref 1–4.8)
LYMPHOCYTES NFR BLD: 25.8 % (ref 18–48)
MCH RBC QN AUTO: 27.4 PG (ref 27–31)
MCHC RBC AUTO-ENTMCNC: 30.5 G/DL (ref 32–36)
MCV RBC AUTO: 90 FL (ref 82–98)
MONOCYTES # BLD AUTO: 0.3 K/UL (ref 0.3–1)
MONOCYTES NFR BLD: 6.8 % (ref 4–15)
NEUTROPHILS # BLD AUTO: 2.4 K/UL (ref 1.8–7.7)
NEUTROPHILS NFR BLD: 59.5 % (ref 38–73)
NRBC BLD-RTO: 0 /100 WBC
OHS QRS DURATION: 64 MS
OHS QTC CALCULATION: 462 MS
PLATELET # BLD AUTO: 252 K/UL (ref 150–450)
PMV BLD AUTO: 9.3 FL (ref 9.2–12.9)
POTASSIUM SERPL-SCNC: 3.9 MMOL/L (ref 3.5–5.1)
RBC # BLD AUTO: 3.83 M/UL (ref 4–5.4)
SARS-COV-2 RDRP RESP QL NAA+PROBE: NEGATIVE
SODIUM SERPL-SCNC: 143 MMOL/L (ref 136–145)
TROPONIN I SERPL DL<=0.01 NG/ML-MCNC: 0.01 NG/ML (ref 0–0.03)
WBC # BLD AUTO: 3.99 K/UL (ref 3.9–12.7)

## 2024-10-06 PROCEDURE — 84484 ASSAY OF TROPONIN QUANT: CPT | Performed by: EMERGENCY MEDICINE

## 2024-10-06 PROCEDURE — 99285 EMERGENCY DEPT VISIT HI MDM: CPT | Mod: 25

## 2024-10-06 PROCEDURE — 83880 ASSAY OF NATRIURETIC PEPTIDE: CPT | Performed by: EMERGENCY MEDICINE

## 2024-10-06 PROCEDURE — 85025 COMPLETE CBC W/AUTO DIFF WBC: CPT | Performed by: EMERGENCY MEDICINE

## 2024-10-06 PROCEDURE — 93010 ELECTROCARDIOGRAM REPORT: CPT | Mod: ,,, | Performed by: INTERNAL MEDICINE

## 2024-10-06 PROCEDURE — 80048 BASIC METABOLIC PNL TOTAL CA: CPT | Performed by: EMERGENCY MEDICINE

## 2024-10-06 PROCEDURE — 93005 ELECTROCARDIOGRAM TRACING: CPT

## 2024-10-06 PROCEDURE — 85379 FIBRIN DEGRADATION QUANT: CPT | Performed by: EMERGENCY MEDICINE

## 2024-10-06 PROCEDURE — 87635 SARS-COV-2 COVID-19 AMP PRB: CPT | Performed by: EMERGENCY MEDICINE

## 2024-10-06 NOTE — ED NOTES
Joel RN spoke with patient daughter by phone to notify family of patient discharge , patient daughter states patient is chronic asthatic and uses oxygen as needed and they do not have transportation for patient that and she can go [place] home via lyft ride , ER provider doctor Stephani notified at this time

## 2024-10-06 NOTE — ED PROVIDER NOTES
Encounter Date: 10/6/2024       History     Chief Complaint   Patient presents with    Shortness of Breath     BIB EMS for shortness of breath for  3 weeks Hx of COPD     89-year-old female with history of hypertension, asthma and COPD on 3 L all of the time presents via EMS for evaluation of dyspnea for the past 1-2 days.  The triage note states that she has been short of breath for the past 3 weeks with the patient states that this is not correct.  She denies any coughing, fever, chills, myalgias, headache, sore throat, nausea or vomiting, and chest pain.  She denies any known sick contacts.  She did receive 1 breathing treatment from EMS and currently denies any shortness of breath.  She has been using her inhaler roughly 3 times a day which is her usual.  She has not required increased use in her oxygen recently.  She denies any recent steroid use.      Review of patient's allergies indicates:  No Known Allergies  Past Medical History:   Diagnosis Date    Asthma     COPD exacerbation 2024    Hypertension      Past Surgical History:   Procedure Laterality Date    HYSTERECTOMY       Family History   Problem Relation Name Age of Onset    Hypertension Mother      Stroke Father       Social History     Tobacco Use    Smoking status: Former     Current packs/day: 0.00     Types: Cigarettes     Quit date: 3/28/1995     Years since quittin.5    Smokeless tobacco: Never   Substance Use Topics    Alcohol use: Yes     Comment: occasional/social    Drug use: Never     Review of Systems    Physical Exam     Initial Vitals   BP Pulse Resp Temp SpO2   10/06/24 0419 10/06/24 0419 10/06/24 0419 10/06/24 0419 10/06/24 0409   (!) 205/90 (!) 114 19 97.9 °F (36.6 °C) 95 %      MAP       --                Physical Exam    Nursing note and vitals reviewed.  Constitutional: She appears well-developed and well-nourished. She is not diaphoretic. No distress.   HENT:   Head: Normocephalic and atraumatic.   Right Ear: External ear  normal.   Left Ear: External ear normal.   Nose: Nose normal.   Eyes: Conjunctivae and EOM are normal. Right eye exhibits no discharge. Left eye exhibits no discharge.   Neck: Neck supple. JVD present.   Normal range of motion.  Cardiovascular:  Normal rate, regular rhythm and normal heart sounds.     Exam reveals no gallop and no friction rub.       No murmur heard.  Pulmonary/Chest: Breath sounds normal. No respiratory distress. She has no wheezes. She has no rhonchi. She has no rales.   Abdominal: Abdomen is soft. She exhibits no distension. There is no abdominal tenderness. There is no rebound and no guarding.   Musculoskeletal:         General: No tenderness or edema. Normal range of motion.      Cervical back: Normal range of motion and neck supple.     Neurological: She is alert and oriented to person, place, and time.         ED Course   Procedures  Labs Reviewed   CBC W/ AUTO DIFFERENTIAL - Abnormal       Result Value    WBC 3.99      RBC 3.83 (*)     Hemoglobin 10.5 (*)     Hematocrit 34.4 (*)     MCV 90      MCH 27.4      MCHC 30.5 (*)     RDW 13.8      Platelets 252      MPV 9.3      Immature Granulocytes 0.3      Gran # (ANC) 2.4      Immature Grans (Abs) 0.01      Lymph # 1.0      Mono # 0.3      Eos # 0.3      Baso # 0.05      nRBC 0      Gran % 59.5      Lymph % 25.8      Mono % 6.8      Eosinophil % 6.3      Basophil % 1.3      Differential Method Automated     BASIC METABOLIC PANEL - Abnormal    Sodium 143      Potassium 3.9      Chloride 109      CO2 22 (*)     Glucose 87      BUN 20      Creatinine 1.2      Calcium 9.4      Anion Gap 12      eGFR 44 (*)    D DIMER, QUANTITATIVE - Abnormal    D-Dimer 0.81 (*)    B-TYPE NATRIURETIC PEPTIDE    BNP 80     TROPONIN I    Troponin I 0.008     SARS-COV-2 RDRP GENE    POC Rapid COVID Negative       Acceptable Yes            Imaging Results              X-Ray Chest AP Portable (In process)  Result time 10/06/24 05:44:06                      Medications - No data to display  Medical Decision Making  89-year-old female with history of COPD/asthma presents complaining of increased dyspnea over the past 1-2 days with no other symptoms.  She currently is asymptomatic.  Triage vital signs significant for mild tachycardia.  She was hypoxic on room air but is satting in the upper 90s on her home 3 L.  Tachycardia likely secondary to the albuterol treatment she received in route.  Differential diagnosis includes COVID infection, COPD/asthma exacerbation, PE, new onset heart failure, dysrhythmia, ACS, symptomatic anemia.  Will obtain EKG, chest x-ray, COVID test and lab work.    Age-Adjusted D-dimer for Venous Thromboembolism (VTE) - MDCalcCalculated on Oct 06 2024 5:40  µg/L -> Age-adjusted D-dimer cutoff, FEUVTE unlikely -> Reported D-dimer is less than or equal to cutoff; consider alternative diagnosis     5:44 AM  On re-evaluation the patient was sitting comfortably.  She states she feels well now.  She was require no further interventions.  I do feel the patient was stable for discharge at this time.    Amount and/or Complexity of Data Reviewed  Labs: ordered.     Details: Age corrected dimer within normal limits.  Troponin and BNP normal.  CBC significant for mild anemia.  No leukocytosis.  BMP without any significant abnormalities.  Radiology: ordered and independent interpretation performed.     Details: No effusion or consolidation.  No pneumothorax.  ECG/medicine tests: independent interpretation performed.     Details: Sinus tachycardia, heart rate 106, Q-wave in leads 3 and V3 no other significant changes compared to 07/25/2024                                      Clinical Impression:  Final diagnoses:  [R06.00] Dyspnea  [J44.1] COPD exacerbation (Primary)          ED Disposition Condition    Discharge Stable          ED Prescriptions    None       Follow-up Information       Follow up With Specialties Details Why Contact Info    Primary care   Schedule an appointment as soon as possible for a visit       [facility] - Emergency Dept Emergency Medicine Go to  If symptoms worsen [id number] [facility] [id number] [phone]             Kennedi Styles MD  10/06/24 0503

## 2024-10-14 PROBLEM — J96.01 ACUTE RESPIRATORY FAILURE WITH HYPOXIA AND HYPERCAPNIA: Status: RESOLVED | Noted: 2024-07-09 | Resolved: 2024-10-14

## 2024-10-14 PROBLEM — J96.02 ACUTE RESPIRATORY FAILURE WITH HYPOXIA AND HYPERCAPNIA: Status: RESOLVED | Noted: 2024-07-09 | Resolved: 2024-10-14

## 2024-11-10 ENCOUNTER — HOSPITAL ENCOUNTER (OUTPATIENT)
Facility: OTHER | Age: 89
Discharge: STILL ADMITTED OR ADMINISTRATIVE | End: 2024-11-11
Attending: EMERGENCY MEDICINE | Admitting: EMERGENCY MEDICINE
Payer: MEDICARE

## 2024-11-10 DIAGNOSIS — J44.1 COPD EXACERBATION: Primary | ICD-10-CM

## 2024-11-10 DIAGNOSIS — R06.02 SOB (SHORTNESS OF BREATH): ICD-10-CM

## 2024-11-10 PROBLEM — J45.30 MILD PERSISTENT ASTHMA WITHOUT COMPLICATION: Chronic | Status: ACTIVE | Noted: 2019-02-23

## 2024-11-10 LAB
ALBUMIN SERPL BCP-MCNC: 4.2 G/DL (ref 3.5–5.2)
ALP SERPL-CCNC: 53 U/L (ref 40–150)
ALT SERPL W/O P-5'-P-CCNC: 10 U/L (ref 10–44)
ANION GAP SERPL CALC-SCNC: 12 MMOL/L (ref 8–16)
AST SERPL-CCNC: 20 U/L (ref 10–40)
BASOPHILS # BLD AUTO: 0.04 K/UL (ref 0–0.2)
BASOPHILS NFR BLD: 0.9 % (ref 0–1.9)
BILIRUB SERPL-MCNC: 0.4 MG/DL (ref 0.1–1)
BUN SERPL-MCNC: 18 MG/DL (ref 8–23)
CALCIUM SERPL-MCNC: 9.5 MG/DL (ref 8.7–10.5)
CHLORIDE SERPL-SCNC: 110 MMOL/L (ref 95–110)
CO2 SERPL-SCNC: 19 MMOL/L (ref 23–29)
CREAT SERPL-MCNC: 1 MG/DL (ref 0.5–1.4)
CTP QC/QA: YES
CTP QC/QA: YES
DIFFERENTIAL METHOD BLD: ABNORMAL
EOSINOPHIL # BLD AUTO: 0.2 K/UL (ref 0–0.5)
EOSINOPHIL NFR BLD: 4.2 % (ref 0–8)
ERYTHROCYTE [DISTWIDTH] IN BLOOD BY AUTOMATED COUNT: 14.3 % (ref 11.5–14.5)
EST. GFR  (NO RACE VARIABLE): 55 ML/MIN/1.73 M^2
GLUCOSE SERPL-MCNC: 98 MG/DL (ref 70–110)
HCO3 UR-SCNC: 24.6 MMOL/L (ref 24–28)
HCT VFR BLD AUTO: 35.9 % (ref 37–48.5)
HCT VFR BLD CALC: 32 %PCV (ref 36–54)
HGB BLD-MCNC: 11 G/DL
HGB BLD-MCNC: 11.6 G/DL (ref 12–16)
IMM GRANULOCYTES # BLD AUTO: 0.02 K/UL (ref 0–0.04)
IMM GRANULOCYTES NFR BLD AUTO: 0.5 % (ref 0–0.5)
LYMPHOCYTES # BLD AUTO: 1 K/UL (ref 1–4.8)
LYMPHOCYTES NFR BLD: 23 % (ref 18–48)
MCH RBC QN AUTO: 28.4 PG (ref 27–31)
MCHC RBC AUTO-ENTMCNC: 32.3 G/DL (ref 32–36)
MCV RBC AUTO: 88 FL (ref 82–98)
MONOCYTES # BLD AUTO: 0.3 K/UL (ref 0.3–1)
MONOCYTES NFR BLD: 6.1 % (ref 4–15)
NEUTROPHILS # BLD AUTO: 2.8 K/UL (ref 1.8–7.7)
NEUTROPHILS NFR BLD: 65.3 % (ref 38–73)
NRBC BLD-RTO: 0 /100 WBC
PCO2 BLDA: 41.5 MMHG (ref 35–45)
PH SMN: 7.38 [PH] (ref 7.35–7.45)
PLATELET # BLD AUTO: 270 K/UL (ref 150–450)
PMV BLD AUTO: 9.1 FL (ref 9.2–12.9)
PO2 BLDA: 87 MMHG (ref 80–100)
POC BE: -1 MMOL/L
POC MOLECULAR INFLUENZA A AGN: NEGATIVE
POC MOLECULAR INFLUENZA B AGN: NEGATIVE
POC SATURATED O2: 96 % (ref 95–100)
POC TCO2: 26 MMOL/L (ref 23–27)
POTASSIUM SERPL-SCNC: 4.7 MMOL/L (ref 3.5–5.1)
PROT SERPL-MCNC: 7.7 G/DL (ref 6–8.4)
RBC # BLD AUTO: 4.08 M/UL (ref 4–5.4)
SAMPLE: ABNORMAL
SARS-COV-2 RDRP RESP QL NAA+PROBE: NEGATIVE
SODIUM SERPL-SCNC: 141 MMOL/L (ref 136–145)
WBC # BLD AUTO: 4.26 K/UL (ref 3.9–12.7)

## 2024-11-10 PROCEDURE — 96365 THER/PROPH/DIAG IV INF INIT: CPT

## 2024-11-10 PROCEDURE — 93005 ELECTROCARDIOGRAM TRACING: CPT

## 2024-11-10 PROCEDURE — 25000003 PHARM REV CODE 250: Performed by: PHYSICIAN ASSISTANT

## 2024-11-10 PROCEDURE — 93010 ELECTROCARDIOGRAM REPORT: CPT | Mod: ,,, | Performed by: INTERNAL MEDICINE

## 2024-11-10 PROCEDURE — 85025 COMPLETE CBC W/AUTO DIFF WBC: CPT | Performed by: EMERGENCY MEDICINE

## 2024-11-10 PROCEDURE — 80053 COMPREHEN METABOLIC PANEL: CPT | Performed by: EMERGENCY MEDICINE

## 2024-11-10 PROCEDURE — 99285 EMERGENCY DEPT VISIT HI MDM: CPT | Mod: 25

## 2024-11-10 PROCEDURE — 63600175 PHARM REV CODE 636 W HCPCS: Performed by: EMERGENCY MEDICINE

## 2024-11-10 PROCEDURE — 99900035 HC TECH TIME PER 15 MIN (STAT)

## 2024-11-10 PROCEDURE — 27000221 HC OXYGEN, UP TO 24 HOURS

## 2024-11-10 PROCEDURE — 25000242 PHARM REV CODE 250 ALT 637 W/ HCPCS: Performed by: PHYSICIAN ASSISTANT

## 2024-11-10 PROCEDURE — 87635 SARS-COV-2 COVID-19 AMP PRB: CPT | Performed by: EMERGENCY MEDICINE

## 2024-11-10 PROCEDURE — 94640 AIRWAY INHALATION TREATMENT: CPT | Mod: XB

## 2024-11-10 PROCEDURE — 82803 BLOOD GASES ANY COMBINATION: CPT

## 2024-11-10 PROCEDURE — G0378 HOSPITAL OBSERVATION PER HR: HCPCS

## 2024-11-10 PROCEDURE — 96366 THER/PROPH/DIAG IV INF ADDON: CPT

## 2024-11-10 PROCEDURE — 25000242 PHARM REV CODE 250 ALT 637 W/ HCPCS: Performed by: EMERGENCY MEDICINE

## 2024-11-10 PROCEDURE — 36600 WITHDRAWAL OF ARTERIAL BLOOD: CPT

## 2024-11-10 PROCEDURE — 96375 TX/PRO/DX INJ NEW DRUG ADDON: CPT

## 2024-11-10 PROCEDURE — 63600175 PHARM REV CODE 636 W HCPCS: Performed by: PHYSICIAN ASSISTANT

## 2024-11-10 PROCEDURE — 94761 N-INVAS EAR/PLS OXIMETRY MLT: CPT | Mod: XB

## 2024-11-10 RX ORDER — ACETAMINOPHEN 325 MG/1
650 TABLET ORAL EVERY 4 HOURS PRN
Status: DISCONTINUED | OUTPATIENT
Start: 2024-11-10 | End: 2024-11-11 | Stop reason: HOSPADM

## 2024-11-10 RX ORDER — LACTULOSE 10 G/15ML
10 SOLUTION ORAL 2 TIMES DAILY PRN
Status: DISCONTINUED | OUTPATIENT
Start: 2024-11-10 | End: 2024-11-11 | Stop reason: HOSPADM

## 2024-11-10 RX ORDER — LISINOPRIL 20 MG/1
20 TABLET ORAL DAILY
Status: DISCONTINUED | OUTPATIENT
Start: 2024-11-10 | End: 2024-11-11 | Stop reason: HOSPADM

## 2024-11-10 RX ORDER — TALC
6 POWDER (GRAM) TOPICAL NIGHTLY PRN
Status: DISCONTINUED | OUTPATIENT
Start: 2024-11-10 | End: 2024-11-11 | Stop reason: HOSPADM

## 2024-11-10 RX ORDER — MAGNESIUM SULFATE HEPTAHYDRATE 40 MG/ML
2 INJECTION, SOLUTION INTRAVENOUS ONCE
Status: COMPLETED | OUTPATIENT
Start: 2024-11-10 | End: 2024-11-10

## 2024-11-10 RX ORDER — IPRATROPIUM BROMIDE AND ALBUTEROL SULFATE 2.5; .5 MG/3ML; MG/3ML
3 SOLUTION RESPIRATORY (INHALATION)
Status: COMPLETED | OUTPATIENT
Start: 2024-11-10 | End: 2024-11-10

## 2024-11-10 RX ORDER — IPRATROPIUM BROMIDE 0.5 MG/2.5ML
1 SOLUTION RESPIRATORY (INHALATION)
Status: COMPLETED | OUTPATIENT
Start: 2024-11-10 | End: 2024-11-10

## 2024-11-10 RX ORDER — LIDOCAINE 50 MG/G
1 PATCH TOPICAL
Status: DISCONTINUED | OUTPATIENT
Start: 2024-11-10 | End: 2024-11-11 | Stop reason: HOSPADM

## 2024-11-10 RX ORDER — SODIUM CHLORIDE 0.9 % (FLUSH) 0.9 %
3 SYRINGE (ML) INJECTION
Status: DISCONTINUED | OUTPATIENT
Start: 2024-11-10 | End: 2024-11-11 | Stop reason: HOSPADM

## 2024-11-10 RX ORDER — METHYLPREDNISOLONE SOD SUCC 125 MG
125 VIAL (EA) INJECTION
Status: COMPLETED | OUTPATIENT
Start: 2024-11-10 | End: 2024-11-10

## 2024-11-10 RX ORDER — AMLODIPINE BESYLATE 5 MG/1
10 TABLET ORAL DAILY
Status: DISCONTINUED | OUTPATIENT
Start: 2024-11-11 | End: 2024-11-10

## 2024-11-10 RX ORDER — ACETAMINOPHEN 325 MG/1
650 TABLET ORAL EVERY 8 HOURS PRN
Status: DISCONTINUED | OUTPATIENT
Start: 2024-11-10 | End: 2024-11-11 | Stop reason: HOSPADM

## 2024-11-10 RX ORDER — PREDNISONE 20 MG/1
40 TABLET ORAL DAILY
Status: DISCONTINUED | OUTPATIENT
Start: 2024-11-10 | End: 2024-11-11 | Stop reason: HOSPADM

## 2024-11-10 RX ORDER — SODIUM CHLORIDE 0.9 % (FLUSH) 0.9 %
10 SYRINGE (ML) INJECTION
Status: DISCONTINUED | OUTPATIENT
Start: 2024-11-10 | End: 2024-11-11 | Stop reason: HOSPADM

## 2024-11-10 RX ORDER — IPRATROPIUM BROMIDE AND ALBUTEROL SULFATE 2.5; .5 MG/3ML; MG/3ML
3 SOLUTION RESPIRATORY (INHALATION)
Status: DISCONTINUED | OUTPATIENT
Start: 2024-11-10 | End: 2024-11-11 | Stop reason: HOSPADM

## 2024-11-10 RX ORDER — ALBUTEROL SULFATE 2.5 MG/.5ML
15 SOLUTION RESPIRATORY (INHALATION)
Status: COMPLETED | OUTPATIENT
Start: 2024-11-10 | End: 2024-11-10

## 2024-11-10 RX ORDER — AMLODIPINE BESYLATE 5 MG/1
10 TABLET ORAL DAILY
Status: DISCONTINUED | OUTPATIENT
Start: 2024-11-10 | End: 2024-11-11 | Stop reason: HOSPADM

## 2024-11-10 RX ORDER — LACTULOSE 10 G/15ML
10 SOLUTION ORAL 2 TIMES DAILY PRN
COMMUNITY
Start: 2024-07-29

## 2024-11-10 RX ORDER — DONEPEZIL HYDROCHLORIDE 5 MG/1
5 TABLET, FILM COATED ORAL NIGHTLY
Status: DISCONTINUED | OUTPATIENT
Start: 2024-11-10 | End: 2024-11-11 | Stop reason: HOSPADM

## 2024-11-10 RX ORDER — FLUTICASONE FUROATE AND VILANTEROL 100; 25 UG/1; UG/1
1 POWDER RESPIRATORY (INHALATION) DAILY
Status: DISCONTINUED | OUTPATIENT
Start: 2024-11-10 | End: 2024-11-11 | Stop reason: HOSPADM

## 2024-11-10 RX ADMIN — IPRATROPIUM BROMIDE AND ALBUTEROL SULFATE 3 ML: 2.5; .5 SOLUTION RESPIRATORY (INHALATION) at 02:11

## 2024-11-10 RX ADMIN — IPRATROPIUM BROMIDE 1 MG: 0.5 SOLUTION RESPIRATORY (INHALATION) at 09:11

## 2024-11-10 RX ADMIN — AMLODIPINE BESYLATE 10 MG: 5 TABLET ORAL at 04:11

## 2024-11-10 RX ADMIN — METHYLPREDNISOLONE SODIUM SUCCINATE 125 MG: 125 INJECTION, POWDER, FOR SOLUTION INTRAMUSCULAR; INTRAVENOUS at 01:11

## 2024-11-10 RX ADMIN — MAGNESIUM SULFATE HEPTAHYDRATE 2 G: 40 INJECTION, SOLUTION INTRAVENOUS at 03:11

## 2024-11-10 RX ADMIN — LISINOPRIL 20 MG: 20 TABLET ORAL at 04:11

## 2024-11-10 RX ADMIN — PREDNISONE 40 MG: 20 TABLET ORAL at 03:11

## 2024-11-10 RX ADMIN — IPRATROPIUM BROMIDE AND ALBUTEROL SULFATE 3 ML: 2.5; .5 SOLUTION RESPIRATORY (INHALATION) at 01:11

## 2024-11-10 RX ADMIN — DONEPEZIL HYDROCHLORIDE 5 MG: 5 TABLET, FILM COATED ORAL at 08:11

## 2024-11-10 RX ADMIN — ALBUTEROL SULFATE 15 MG: 2.5 SOLUTION RESPIRATORY (INHALATION) at 09:11

## 2024-11-10 RX ADMIN — FLUTICASONE FUROATE AND VILANTEROL TRIFENATATE 1 PUFF: 100; 25 POWDER RESPIRATORY (INHALATION) at 03:11

## 2024-11-10 RX ADMIN — LIDOCAINE 1 PATCH: 50 PATCH CUTANEOUS at 08:11

## 2024-11-10 RX ADMIN — ACETAMINOPHEN 650 MG: 325 TABLET, FILM COATED ORAL at 06:11

## 2024-11-10 RX ADMIN — IPRATROPIUM BROMIDE AND ALBUTEROL SULFATE 3 ML: 2.5; .5 SOLUTION RESPIRATORY (INHALATION) at 08:11

## 2024-11-10 RX ADMIN — IPRATROPIUM BROMIDE AND ALBUTEROL SULFATE 3 ML: 2.5; .5 SOLUTION RESPIRATORY (INHALATION) at 03:11

## 2024-11-10 NOTE — H&P
[facility] Medicine  History & Physical    Patient Name: Gloria Garcia  MRN: [id number]  Patient Class: OP- Observation  Admission Date: 11/10/2024  Attending Physician: Susie Amaya MD   Primary Care Provider: [facility] Primary Doctor         Patient information was obtained from patient, past medical records, and ER records.     Subjective:     Principal Problem:COPD exacerbation    Chief Complaint:   Chief Complaint   Patient presents with    Shortness of Breath     SOB onset yesterday with cough. Daughter reports cold 1-2 wks ago, that has not resolved. Per EMS, 77% on RA, improved 100% with duoneb and 4L NC. Hx of COPD.        HPI: Ms. Gloria Garcia is a 89 F with hypertension, dementia, COPD, constipation and asthma who presents for shortness of breath that suddenly started. No family at bedside. She was seen in the ER about a month ago for similar complaints, diagnosed with COPD exacerbation. She says she has been in her usual state of health this month, no fevers or cough.  This morning she began to feel short of breath and tried her albuterol with no relief. Denies chest pain, headaches, cough, wheezing, leg swelling. Per ER noted EMS initially foundher hypoxic 77%, placed on 4L NC and given duonebs with improvement. Not a current smoker.     In ER: afebrile without leukocytosis, O2 sats 82% with ambulation; BP elevated 190s systolic (did not take her home meds); CBC/CMP unremarkable, Covid and flu negative; CXR unremarkable, ABG unremarkable; given IV solumedrol and duonebs in ER    Past Medical History:   Diagnosis Date    Asthma     COPD exacerbation 7/9/2024    Hypertension        Past Surgical History:   Procedure Laterality Date    HYSTERECTOMY         Review of patient's allergies indicates:  No Known Allergies    No current facility-administered medications on file prior to encounter.     Current Outpatient Medications on File Prior to Encounter   Medication Sig     lactulose 10 gram/15 ml (CHRONULAC) 10 gram/15 mL (15 mL) solution Take 10 g by mouth 2 (two) times daily as needed (constipation).    acetaminophen (TYLENOL) 500 MG tablet Take 500 mg by mouth 2 (two) times daily as needed for Pain.    albuterol (PROVENTIL/VENTOLIN HFA) 90 mcg/actuation inhaler Inhale 1-2 puffs into the lungs every 6 (six) hours as needed for Wheezing. Rescue    amlodipine-benazepril 10-20mg (LOTREL) 10-20 mg per capsule Take 1 capsule by mouth every morning.    ARICEPT 5 mg tablet Take 5 mg by mouth every evening.    atorvastatin (LIPITOR) 10 MG tablet Take 10 mg by mouth once daily. (Patient not taking: Reported on 2024)    chlorthalidone (HYGROTEN) 25 MG Tab Take 25 mg by mouth once daily. (Patient not taking: Reported on 2024)    cyanocobalamin, vitamin B-12, (VITAMIN B-12 ORAL) Take 1 tablet by mouth once daily. (Patient not taking: Reported on 2024)    trolamine salicylate (ARTHRITIS TOP) Apply topically daily as needed (Pain).    WIXELA INHUB 250-50 mcg/dose diskus inhaler Inhale 1 puff into the lungs Daily.     Family History       Problem Relation (Age of Onset)    Hypertension Mother    Stroke Father          Tobacco Use    Smoking status: Former     Current packs/day: 0.00     Types: Cigarettes     Quit date: 3/28/1995     Years since quittin.6    Smokeless tobacco: Never   Substance and Sexual Activity    Alcohol use: Yes     Comment: occasional/social    Drug use: Never    Sexual activity: Not Currently     Review of Systems   Constitutional:  Negative for fatigue and fever.   Respiratory:  Positive for shortness of breath. Negative for cough and wheezing.    Cardiovascular:  Negative for chest pain and leg swelling.   Gastrointestinal:  Positive for constipation (chronic). Negative for abdominal pain and nausea.   Genitourinary:  Negative for difficulty urinating and dysuria.   Musculoskeletal:  Positive for gait problem (uses walker or cane at baseline). Negative  "for arthralgias.   Skin:  Negative for color change.   Neurological:  Negative for weakness and headaches.   Psychiatric/Behavioral:  Negative for agitation. The patient is not nervous/anxious.      Objective:     Vital Signs (Most Recent):  Temp: 97.3 °F (36.3 °C) (11/10/24 0910)  Pulse: 83 (11/10/24 1502)  Resp: (!) 35 (11/10/24 1502)  BP: (!) 216/100 (11/10/24 1520)  SpO2: 100 % (11/10/24 1439) Vital Signs (24h Range):  Temp:  [97.3 °F (36.3 °C)] 97.3 °F (36.3 °C)  Pulse:  [] 83  Resp:  [14-36] 35  SpO2:  [82 %-100 %] 100 %  BP: (143-217)/() 216/100        There is no height or weight on file to calculate BMI.     Physical Exam  Constitutional:       General: She is not in acute distress.  HENT:      Head: Normocephalic and atraumatic.   Eyes:      Extraocular Movements: Extraocular movements intact.   Cardiovascular:      Rate and Rhythm: Normal rate and regular rhythm.   Pulmonary:      Effort: Pulmonary effort is normal. No respiratory distress.      Breath sounds: No wheezing.      Comments: Tight breath sounds  Abdominal:      General: There is no distension.      Palpations: Abdomen is soft.      Tenderness: There is no abdominal tenderness.   Musculoskeletal:      Right lower leg: No edema.      Left lower leg: No edema.   Skin:     General: Skin is warm and dry.   Neurological:      General: No focal deficit present.      Mental Status: She is alert and oriented to person, place, and time.      Comments: Oriented to name, ; for place she said "[facility]"    Psychiatric:         Mood and Affect: Mood normal.         Behavior: Behavior normal.                Significant Labs: All pertinent labs within the past 24 hours have been reviewed.    Significant Imaging: I have reviewed all pertinent imaging results/findings within the past 24 hours.  Assessment/Plan:     * COPD exacerbation  Gloria Garcia presents with shortness of breath that started this morning, 77% ORA per EMS    - " sats stable at rest, 82% with exertion requiring 2L NC  - flu and covid negative  - CXR unremarkable  - ABG reassuring, normal pH and pO2/pCO2  - feeling better after IV solumedrol and duonebs  - continue daily prednisone, duonebs, IS, mucinex  - has asthma history, will give IV mg x 1  - pulmonology referral at [place]    Essential hypertension  Patients blood pressure range in the last 24 hours was: BP  Min: 143/65  Max: 217/105.The patient's inpatient anti-hypertensive regimen is listed below:  Current Antihypertensives  amLODIPine tablet 10 mg, Daily, Oral  lisinopriL tablet 20 mg, Daily, Oral    Plan  Uncontrolled  Possibly contributing to SOB  Did not take her AM meds, give amlodipine and lisinopril now      VTE Risk Mitigation (From admission, onward)           Ordered     IP VTE HIGH RISK PATIENT  Once         11/10/24 1446     Place sequential compression device  Until discontinued         11/10/24 1446     IP VTE HIGH RISK PATIENT  Once         11/10/24 1446     Place sequential compression device  Until discontinued         11/10/24 1446                         On 11/10/2024, patient should be placed in hospital observation services under my care in collaboration with Dr. Amaya.           Sujey Stokes PA-C  Department of Hospital Medicine  [facility] - Emergency Dept

## 2024-11-10 NOTE — ED PROVIDER NOTES
Encounter Date: 11/10/2024    SCRIBE #1 NOTE: I, Manjeet Carmona, am scribing for, and in the presence of,  Rupa Myrick MD. I have scribed the following portions of the note - Other sections scribed: HPI, ROS, PE.       History     Chief Complaint   Patient presents with    Shortness of Breath     SOB onset yesterday with cough. Daughter reports cold 1-2 wks ago, that has not resolved. Per EMS, 77% on RA, improved 100% with duoneb and 4L NC. Hx of COPD.     Time seen by physician: 9:25 AM    This is a 89 y.o. female with PMHx of hypertension, asthma and COPD who presents by EMS with complaint of shortness of breath. She reports that her shortness of breath began this morning, and attempted to give herself 2 breathing treatments at home without any relief. She stated that she was not feeling sick yesterday, but per triage note her daughter said she had a cough and shortness of breath. Per EMS she had a cold approximately 1-2 weeks ago that has been lingering. EMS stated that upon arrival her O2 stats was around 77% after 4 L nasal canula and duonebs she had improvement of O2 stats.     Patient denies any other complaints at this time.      The history is provided by the patient and the EMS personnel.     Review of patient's allergies indicates:  No Known Allergies  Past Medical History:   Diagnosis Date    Asthma     COPD exacerbation 2024    Hypertension      Past Surgical History:   Procedure Laterality Date    HYSTERECTOMY       Family History   Problem Relation Name Age of Onset    Hypertension Mother      Stroke Father       Social History     Tobacco Use    Smoking status: Former     Current packs/day: 0.00     Types: Cigarettes     Quit date: 3/28/1995     Years since quittin.6    Smokeless tobacco: Never   Substance Use Topics    Alcohol use: Yes     Comment: occasional/social    Drug use: Never     Review of Systems   Constitutional:  Negative for chills and fever.   HENT:  Negative for congestion  and rhinorrhea.    Respiratory:  Positive for shortness of breath. Negative for chest tightness.    Cardiovascular:  Negative for chest pain and palpitations.   Gastrointestinal:  Negative for abdominal pain, diarrhea, nausea and vomiting.   Genitourinary:  Negative for dysuria and flank pain.   Musculoskeletal:  Negative for back pain and neck pain.   Skin:  Negative for color change and wound.   Neurological:  Negative for dizziness and headaches.       Physical Exam     Initial Vitals [11/10/24 0910]   BP Pulse Resp Temp SpO2   (!) 182/91 80 (!) 22 97.3 °F (36.3 °C) 100 %      MAP       --         Physical Exam    Nursing note and vitals reviewed.  Constitutional: She appears well-developed and well-nourished.   HENT:   Head: Normocephalic and atraumatic.   Eyes: Conjunctivae are normal.   Neck: Neck supple.   Normal range of motion.  Cardiovascular:  Normal rate, regular rhythm and normal heart sounds.           Pulmonary/Chest: No respiratory distress. She has wheezes (Diffuse). She has no rales.   Increased work of breathing.    Abdominal: Abdomen is soft. Bowel sounds are normal. She exhibits no distension. There is no abdominal tenderness. There is no rebound.   Musculoskeletal:         General: Normal range of motion.      Cervical back: Normal range of motion and neck supple.     Neurological: She is alert and oriented to person, place, and time.   Skin: Skin is warm and dry. Capillary refill takes less than 2 seconds.         ED Course   Procedures  Labs Reviewed   COMPREHENSIVE METABOLIC PANEL - Abnormal       Result Value    Sodium 141      Potassium 4.7      Chloride 110      CO2 19 (*)     Glucose 98      BUN 18      Creatinine 1.0      Calcium 9.5      Total Protein 7.7      Albumin 4.2      Total Bilirubin 0.4      Alkaline Phosphatase 53      AST 20      ALT 10      eGFR 55 (*)     Anion Gap 12     CBC W/ AUTO DIFFERENTIAL - Abnormal    WBC 4.26      RBC 4.08      Hemoglobin 11.6 (*)     Hematocrit  35.9 (*)     MCV 88      MCH 28.4      MCHC 32.3      RDW 14.3      Platelets 270      MPV 9.1 (*)     Immature Granulocytes 0.5      Gran # (ANC) 2.8      Immature Grans (Abs) 0.02      Lymph # 1.0      Mono # 0.3      Eos # 0.2      Baso # 0.04      nRBC 0      Gran % 65.3      Lymph % 23.0      Mono % 6.1      Eosinophil % 4.2      Basophil % 0.9      Differential Method Automated     ISTAT PROCEDURE - Abnormal    POC PH 7.381      POC PCO2 41.5      POC PO2 87      POC HCO3 24.6      POC BE -1      POC SATURATED O2 96      POC TCO2 26      POC Hematocrit 32 (*)     POC HEMOGLOBIN 11      Sample ARTERIAL     CULTURE, RESPIRATORY   SARS-COV-2 RDRP GENE    POC Rapid COVID Negative       Acceptable Yes     POCT INFLUENZA A/B MOLECULAR    POC Molecular Influenza A Ag Negative      POC Molecular Influenza B Ag Negative       Acceptable Yes       EKG Readings: (Independently Interpreted)   9:54AM:  Rate of 64.  Normal sinus rhythm.  Normal axis.  Normal intervals.  No ST or ischemic changes.       Imaging Results              X-Ray Chest AP Portable (Final result)  Result time 11/10/24 09:45:19      Final result by Chase Hays MD (11/10/24 09:45:19)                   Impression:      No interval detrimental change from comparison radiograph.      Electronically signed by: Chase Hays  Date:    11/10/2024  Time:    09:45               Narrative:    EXAMINATION:  XR CHEST AP PORTABLE    CLINICAL HISTORY:  Shortness of breath    TECHNIQUE:  Single frontal view of the chest was performed.    COMPARISON:  Chest radiograph 10/06/2024    FINDINGS:  Cardiac leads overlie the field of view.  Cardiomediastinal silhouette is unchanged in size.  Tortuosity and atherosclerosis of the aorta.  Mediastinal structures are midline.    Hyperexpansion of the lungs.  Coarsened interstitial lung markings with similar left greater than right basilar opacities, likely representing atelectasis.  No new  consolidation, pleural effusion, or pneumothorax.    Osseous structures demonstrate no evidence for acute fracture or osseous destructive lesion.  Degenerative change.    Soft tissues are unremarkable.  No free intra-abdominal air.                                    X-Rays:   Independently Interpreted Readings:   Chest X-Ray: Trachea midline.  No cardiomegaly.  No effusion, infiltrate, edema.     Medications   sodium chloride 0.9% flush 10 mL (has no administration in time range)   melatonin tablet 6 mg (has no administration in time range)   donepeziL tablet 5 mg (has no administration in time range)   lactulose 20 gram/30 mL solution Soln 10 g (has no administration in time range)   fluticasone furoate-vilanteroL 100-25 mcg/dose diskus inhaler 1 puff (1 puff Inhalation Given 11/10/24 1552)   sodium chloride 0.9% flush 3 mL (has no administration in time range)   predniSONE tablet 40 mg (40 mg Oral Given 11/10/24 1508)   albuterol-ipratropium 2.5 mg-0.5 mg/3 mL nebulizer solution 3 mL (3 mLs Nebulization Given 11/10/24 1549)   acetaminophen tablet 650 mg (has no administration in time range)   acetaminophen tablet 650 mg (has no administration in time range)   magnesium sulfate 2g in water 50mL IVPB (premix) (2 g Intravenous New Bag 11/10/24 1534)   amLODIPine tablet 10 mg (10 mg Oral Given 11/10/24 1605)   lisinopriL tablet 20 mg (20 mg Oral Given 11/10/24 1605)   albuterol sulfate nebulizer solution 15 mg (15 mg Nebulization Given 11/10/24 0939)   ipratropium 0.02 % nebulizer solution 1 mg (1 mg Nebulization Given 11/10/24 0939)   methylPREDNISolone sodium succinate injection 125 mg (125 mg Intravenous Given 11/10/24 1336)   albuterol-ipratropium 2.5 mg-0.5 mg/3 mL nebulizer solution 3 mL (3 mLs Nebulization Given 11/10/24 1405)     Medical Decision Making  9:25AM:  Patient is an 89-year-old female who presents to the emergency department with shortness of breath.  Patient does have increased work of breathing  with diffuse wheezing.  Will plan for treatments, steroids, will continue to follow and reassess.    Amount and/or Complexity of Data Reviewed  External Data Reviewed: notes.  Labs: ordered. Decision-making details documented in ED Course.  Radiology: ordered and independent interpretation performed. Decision-making details documented in ED Course.  ECG/medicine tests: ordered and independent interpretation performed. Decision-making details documented in ED Course.    Risk  OTC drugs.  Prescription drug management.    12:24 PM:  Patient doing well, remains stable.  She looks much better in his feeling much better.  However upon ambulation, she dropped her O2 sat to 82% with increased work of breathing.  Her chest x-ray and labs are otherwise unremarkable.  Will plan for additional breathing treatments.  Given her persistent hypoxia,will plan to admit for further observation and management.  I discussed the patient with Dr. Amaya, who is agreeable to plan for admission under her service.        Scribe Attestation:   Scribe #1: I performed the above scribed service and the documentation accurately describes the services I performed. I attest to the accuracy of the note.              Physician Attestation for Scribe: I, Rupa Myrick, reviewed documentation as scribed in my presence, which is both accurate and complete.                   Clinical Impression:  Final diagnoses:  [R06.02] SOB (shortness of breath)  [J44.1] COPD exacerbation (Primary)          ED Disposition Condition    Observation Stable                Rupa Myrick MD  11/10/24 0091

## 2024-11-10 NOTE — HPI
Ms. Gloria Garcia is a 89 F with hypertension, dementia, COPD, constipation and asthma who presents for shortness of breath that suddenly started. No family at bedside. She was seen in the ER about a month ago for similar complaints, diagnosed with COPD exacerbation. She says she has been in her usual state of health this month, no fevers or cough.  This morning she began to feel short of breath and tried her albuterol with no relief. Denies chest pain, headaches, cough, wheezing, leg swelling. Per ER noted EMS initially foundher hypoxic 77%, placed on 4L NC and given duonebs with improvement. Not a current smoker.     In ER: afebrile without leukocytosis, O2 sats 82% with ambulation; BP elevated 190s systolic (did not take her home meds); CBC/CMP unremarkable, Covid and flu negative; CXR unremarkable, ABG unremarkable; given IV solumedrol and duonebs in ER

## 2024-11-10 NOTE — Clinical Note
Diagnosis: COPD exacerbation [512553]   Future Attending Provider: JUAN JOSÉ JOSÉ [[id number]]   Is the patient being sent to ED Observation?: No   Special Needs:: No Special Needs [1]

## 2024-11-10 NOTE — NURSING
Patient received in room 317 from ED. Patient is Aox3, disoriented to time. Vitals stable on 2l of O2 via nasal cannula. Patient placed on purewick. Patient was made oriented about the room setting and call light.

## 2024-11-10 NOTE — ASSESSMENT & PLAN NOTE
Patients blood pressure range in the last 24 hours was: BP  Min: 143/65  Max: 217/105.The patient's inpatient anti-hypertensive regimen is listed below:  Current Antihypertensives  amLODIPine tablet 10 mg, Daily, Oral  lisinopriL tablet 20 mg, Daily, Oral    Plan  Uncontrolled  Possibly contributing to SOB  Did not take her AM meds, give amlodipine and lisinopril now

## 2024-11-10 NOTE — SUBJECTIVE & OBJECTIVE
Past Medical History:   Diagnosis Date    Asthma     COPD exacerbation 2024    Hypertension        Past Surgical History:   Procedure Laterality Date    HYSTERECTOMY         Review of patient's allergies indicates:  No Known Allergies    No current facility-administered medications on file prior to encounter.     Current Outpatient Medications on File Prior to Encounter   Medication Sig    lactulose 10 gram/15 ml (CHRONULAC) 10 gram/15 mL (15 mL) solution Take 10 g by mouth 2 (two) times daily as needed (constipation).    acetaminophen (TYLENOL) 500 MG tablet Take 500 mg by mouth 2 (two) times daily as needed for Pain.    albuterol (PROVENTIL/VENTOLIN HFA) 90 mcg/actuation inhaler Inhale 1-2 puffs into the lungs every 6 (six) hours as needed for Wheezing. Rescue    amlodipine-benazepril 10-20mg (LOTREL) 10-20 mg per capsule Take 1 capsule by mouth every morning.    ARICEPT 5 mg tablet Take 5 mg by mouth every evening.    atorvastatin (LIPITOR) 10 MG tablet Take 10 mg by mouth once daily. (Patient not taking: Reported on 2024)    chlorthalidone (HYGROTEN) 25 MG Tab Take 25 mg by mouth once daily. (Patient not taking: Reported on 2024)    cyanocobalamin, vitamin B-12, (VITAMIN B-12 ORAL) Take 1 tablet by mouth once daily. (Patient not taking: Reported on 2024)    trolamine salicylate (ARTHRITIS TOP) Apply topically daily as needed (Pain).    WIXELA INHUB 250-50 mcg/dose diskus inhaler Inhale 1 puff into the lungs Daily.     Family History       Problem Relation (Age of Onset)    Hypertension Mother    Stroke Father          Tobacco Use    Smoking status: Former     Current packs/day: 0.00     Types: Cigarettes     Quit date: 3/28/1995     Years since quittin.6    Smokeless tobacco: Never   Substance and Sexual Activity    Alcohol use: Yes     Comment: occasional/social    Drug use: Never    Sexual activity: Not Currently     Review of Systems   Constitutional:  Negative for fatigue and fever.  "  Respiratory:  Positive for shortness of breath. Negative for cough and wheezing.    Cardiovascular:  Negative for chest pain and leg swelling.   Gastrointestinal:  Positive for constipation (chronic). Negative for abdominal pain and nausea.   Genitourinary:  Negative for difficulty urinating and dysuria.   Musculoskeletal:  Positive for gait problem (uses walker or cane at baseline). Negative for arthralgias.   Skin:  Negative for color change.   Neurological:  Negative for weakness and headaches.   Psychiatric/Behavioral:  Negative for agitation. The patient is not nervous/anxious.      Objective:     Vital Signs (Most Recent):  Temp: 97.3 °F (36.3 °C) (11/10/24 0910)  Pulse: 83 (11/10/24 1502)  Resp: (!) 35 (11/10/24 1502)  BP: (!) 216/100 (11/10/24 1520)  SpO2: 100 % (11/10/24 1439) Vital Signs (24h Range):  Temp:  [97.3 °F (36.3 °C)] 97.3 °F (36.3 °C)  Pulse:  [] 83  Resp:  [14-36] 35  SpO2:  [82 %-100 %] 100 %  BP: (143-217)/() 216/100        There is no height or weight on file to calculate BMI.     Physical Exam  Constitutional:       General: She is not in acute distress.  HENT:      Head: Normocephalic and atraumatic.   Eyes:      Extraocular Movements: Extraocular movements intact.   Cardiovascular:      Rate and Rhythm: Normal rate and regular rhythm.   Pulmonary:      Effort: Pulmonary effort is normal. No respiratory distress.      Breath sounds: No wheezing.      Comments: Tight breath sounds  Abdominal:      General: There is no distension.      Palpations: Abdomen is soft.      Tenderness: There is no abdominal tenderness.   Musculoskeletal:      Right lower leg: No edema.      Left lower leg: No edema.   Skin:     General: Skin is warm and dry.   Neurological:      General: No focal deficit present.      Mental Status: She is alert and oriented to person, place, and time.      Comments: Oriented to name, ; for place she said "[facility]"    Psychiatric:         Mood and " Affect: Mood normal.         Behavior: Behavior normal.                Significant Labs: All pertinent labs within the past 24 hours have been reviewed.    Significant Imaging: I have reviewed all pertinent imaging results/findings within the past 24 hours.

## 2024-11-10 NOTE — ASSESSMENT & PLAN NOTE
Gloria Garcia presents with shortness of breath that started this morning, 77% ORA per EMS    - sats stable at rest, 82% with exertion requiring 2L NC  - flu and covid negative  - CXR unremarkable  - ABG reassuring, normal pH and pO2/pCO2  - feeling better after IV solumedrol and duonebs  - continue daily prednisone, duonebs, IS, mucinex  - has asthma history, will give IV mg x 1  - pulmonology referral at [place]

## 2024-11-11 VITALS
HEIGHT: 64 IN | RESPIRATION RATE: 16 BRPM | SYSTOLIC BLOOD PRESSURE: 150 MMHG | TEMPERATURE: 99 F | OXYGEN SATURATION: 97 % | BODY MASS INDEX: 14.19 KG/M2 | HEART RATE: 82 BPM | DIASTOLIC BLOOD PRESSURE: 68 MMHG | WEIGHT: 83.13 LBS

## 2024-11-11 LAB
ANION GAP SERPL CALC-SCNC: 9 MMOL/L (ref 8–16)
BASOPHILS # BLD AUTO: 0.01 K/UL (ref 0–0.2)
BASOPHILS NFR BLD: 0.2 % (ref 0–1.9)
BUN SERPL-MCNC: 27 MG/DL (ref 8–23)
CALCIUM SERPL-MCNC: 9.5 MG/DL (ref 8.7–10.5)
CHLORIDE SERPL-SCNC: 107 MMOL/L (ref 95–110)
CO2 SERPL-SCNC: 22 MMOL/L (ref 23–29)
CREAT SERPL-MCNC: 1.2 MG/DL (ref 0.5–1.4)
DIFFERENTIAL METHOD BLD: ABNORMAL
EOSINOPHIL # BLD AUTO: 0 K/UL (ref 0–0.5)
EOSINOPHIL NFR BLD: 0 % (ref 0–8)
ERYTHROCYTE [DISTWIDTH] IN BLOOD BY AUTOMATED COUNT: 14.2 % (ref 11.5–14.5)
EST. GFR  (NO RACE VARIABLE): 44 ML/MIN/1.73 M^2
GLUCOSE SERPL-MCNC: 137 MG/DL (ref 70–110)
HCT VFR BLD AUTO: 32.9 % (ref 37–48.5)
HGB BLD-MCNC: 10.5 G/DL (ref 12–16)
IMM GRANULOCYTES # BLD AUTO: 0.03 K/UL (ref 0–0.04)
IMM GRANULOCYTES NFR BLD AUTO: 0.7 % (ref 0–0.5)
LYMPHOCYTES # BLD AUTO: 0.4 K/UL (ref 1–4.8)
LYMPHOCYTES NFR BLD: 8.7 % (ref 18–48)
MAGNESIUM SERPL-MCNC: 2.5 MG/DL (ref 1.6–2.6)
MCH RBC QN AUTO: 27.9 PG (ref 27–31)
MCHC RBC AUTO-ENTMCNC: 31.9 G/DL (ref 32–36)
MCV RBC AUTO: 88 FL (ref 82–98)
MONOCYTES # BLD AUTO: 0.1 K/UL (ref 0.3–1)
MONOCYTES NFR BLD: 1.8 % (ref 4–15)
NEUTROPHILS # BLD AUTO: 4 K/UL (ref 1.8–7.7)
NEUTROPHILS NFR BLD: 88.6 % (ref 38–73)
NRBC BLD-RTO: 0 /100 WBC
OHS QRS DURATION: 64 MS
OHS QRS DURATION: 64 MS
OHS QTC CALCULATION: 445 MS
OHS QTC CALCULATION: 456 MS
PHOSPHATE SERPL-MCNC: 3.5 MG/DL (ref 2.7–4.5)
PLATELET # BLD AUTO: 255 K/UL (ref 150–450)
PMV BLD AUTO: 9.6 FL (ref 9.2–12.9)
POTASSIUM SERPL-SCNC: 5 MMOL/L (ref 3.5–5.1)
RBC # BLD AUTO: 3.76 M/UL (ref 4–5.4)
SODIUM SERPL-SCNC: 138 MMOL/L (ref 136–145)
WBC # BLD AUTO: 4.5 K/UL (ref 3.9–12.7)

## 2024-11-11 PROCEDURE — 94640 AIRWAY INHALATION TREATMENT: CPT | Mod: XB

## 2024-11-11 PROCEDURE — 94761 N-INVAS EAR/PLS OXIMETRY MLT: CPT

## 2024-11-11 PROCEDURE — 83735 ASSAY OF MAGNESIUM: CPT | Performed by: PHYSICIAN ASSISTANT

## 2024-11-11 PROCEDURE — 84100 ASSAY OF PHOSPHORUS: CPT | Performed by: PHYSICIAN ASSISTANT

## 2024-11-11 PROCEDURE — 25000003 PHARM REV CODE 250: Performed by: PHYSICIAN ASSISTANT

## 2024-11-11 PROCEDURE — 99900035 HC TECH TIME PER 15 MIN (STAT)

## 2024-11-11 PROCEDURE — 36415 COLL VENOUS BLD VENIPUNCTURE: CPT | Performed by: PHYSICIAN ASSISTANT

## 2024-11-11 PROCEDURE — 25000242 PHARM REV CODE 250 ALT 637 W/ HCPCS: Performed by: PHYSICIAN ASSISTANT

## 2024-11-11 PROCEDURE — 80048 BASIC METABOLIC PNL TOTAL CA: CPT | Performed by: PHYSICIAN ASSISTANT

## 2024-11-11 PROCEDURE — 25000003 PHARM REV CODE 250: Performed by: EMERGENCY MEDICINE

## 2024-11-11 PROCEDURE — 85025 COMPLETE CBC W/AUTO DIFF WBC: CPT | Performed by: PHYSICIAN ASSISTANT

## 2024-11-11 PROCEDURE — 27000221 HC OXYGEN, UP TO 24 HOURS

## 2024-11-11 PROCEDURE — 63600175 PHARM REV CODE 636 W HCPCS: Performed by: PHYSICIAN ASSISTANT

## 2024-11-11 PROCEDURE — G0378 HOSPITAL OBSERVATION PER HR: HCPCS

## 2024-11-11 RX ORDER — PREDNISONE 20 MG/1
40 TABLET ORAL DAILY
Qty: 6 TABLET | Refills: 0 | Status: SHIPPED | OUTPATIENT
Start: 2024-11-12 | End: 2024-11-15

## 2024-11-11 RX ORDER — ALBUTEROL SULFATE 90 UG/1
1-2 INHALANT RESPIRATORY (INHALATION) EVERY 6 HOURS PRN
Qty: 8.5 G | Refills: 2 | Status: SHIPPED | OUTPATIENT
Start: 2024-11-11 | End: 2024-11-11 | Stop reason: HOSPADM

## 2024-11-11 RX ADMIN — PREDNISONE 40 MG: 20 TABLET ORAL at 08:11

## 2024-11-11 RX ADMIN — ACETAMINOPHEN 650 MG: 325 TABLET, FILM COATED ORAL at 01:11

## 2024-11-11 RX ADMIN — MELATONIN TAB 3 MG 6 MG: 3 TAB at 01:11

## 2024-11-11 RX ADMIN — FLUTICASONE FUROATE AND VILANTEROL TRIFENATATE 1 PUFF: 100; 25 POWDER RESPIRATORY (INHALATION) at 08:11

## 2024-11-11 RX ADMIN — LISINOPRIL 20 MG: 20 TABLET ORAL at 08:11

## 2024-11-11 RX ADMIN — IPRATROPIUM BROMIDE AND ALBUTEROL SULFATE 3 ML: 2.5; .5 SOLUTION RESPIRATORY (INHALATION) at 02:11

## 2024-11-11 RX ADMIN — AMLODIPINE BESYLATE 10 MG: 5 TABLET ORAL at 08:11

## 2024-11-11 RX ADMIN — IPRATROPIUM BROMIDE AND ALBUTEROL SULFATE 3 ML: 2.5; .5 SOLUTION RESPIRATORY (INHALATION) at 07:11

## 2024-11-11 NOTE — PLAN OF CARE
[facility] delivered Oxygen Tank to bedside. Patient was given Education sheets and signed delivery ticket.

## 2024-11-11 NOTE — PROCEDURES
"Instructions for measuring Oxygen Saturation  to qualify for Home Oxygen:     Please obtain and document (REPLACE # [email or web address] AND COPY AND PASTE TEXT INSIDE QUOTATION MARKS) the following for Home Oxygen:     This must be performed and documented within 2 days of discharge.     "Pulse Oximetry: 88  % SpO2 on room air at rest on 11/11/24.     "Pulse Oximetry:  88% SpO2 on room air at rest on 11/11/24.                             86% SpO2 on room air with activity/exercise on 11/11/24.                        92%  SpO2 on 2 liters nasal cannula oxgyen with activity/excercise on 11/11/24.     "

## 2024-11-11 NOTE — DISCHARGE SUMMARY
[facility]  Discharge Summary      Patient Name: Gloria Garcia  MRN: [id number]  SABI: [id number]  Patient Class: OP- Observation  Admission Date: 11/10/2024  Hospital Length of Stay: 0 days  Discharge Date and Time: 11/11/2024  3:01 PM  Attending Physician: Hailey att. providers found   Discharging Provider: Sujey Stokes PA-C  Primary Care Provider: Hailey, Primary Doctor    Primary Care Team: Networked reference to record PCT     HPI:   Ms. Gloria Garcia is a 89 F with hypertension, dementia, COPD, constipation and asthma who presents for shortness of breath that suddenly started. No family at bedside. She was seen in the ER about a month ago for similar complaints, diagnosed with COPD exacerbation. She says she has been in her usual state of health this month, no fevers or cough.  This morning she began to feel short of breath and tried her albuterol with no relief. Denies chest pain, headaches, cough, wheezing, leg swelling. Per ER noted EMS initially foundher hypoxic 77%, placed on 4L NC and given duonebs with improvement. Not a current smoker.     In ER: afebrile without leukocytosis, O2 sats 82% with ambulation; BP elevated 190s systolic (did not take her home meds); CBC/CMP unremarkable, Covid and flu negative; CXR unremarkable, ABG unremarkable; given IV solumedrol and duonebs in ER    * No surgery found *      Hospital Course:   Gloria Garcia was admitted for COPD exacerbation. Initially 76% ORA with EMS. As low as 82% in ER. Afebrile without leukocytosis. CXR unremarkable. Flu and COVID negative. ABG reassuring with normal pH, pCO2 and pO2. Symptoms improved with prednisone, scheduled nebs. At discharge according to her 6MWT she needs 2L NC with exertion. Stable for discharge with prednisone, home oxygen, pulmonology referral. Return precautions discussed, no further questions at discharge     Goals of Care Treatment Preferences:  Code Status: Full Code      SDOH  Screening:  The patient declined to be screened for utility difficulties, food insecurity, transport difficulties, housing insecurity, and interpersonal safety, so no concerns could be identified this admission.     Consults:     Pulmonary  * COPD exacerbation  Gloria Garcia presents with shortness of breath that started this morning, 77% ORA per EMS    - sats stable at rest, 82% with exertion requiring 2L NC  - flu and covid negative  - CXR unremarkable  - ABG reassuring, normal pH and pO2/pCO2  - feeling better after IV solumedrol and duonebs  - continue daily prednisone, duonebs, IS, mucinex  - has asthma history, will give IV mg x 1  - symptoms improved with above measured, requesting discharge which is reasonable  - 6MWT prior to dc: 88% at rest, 2L NC needed with exertion  Stable for dc with prednsione and home oxygen  - pulmonology referral at dc      Final Active Diagnoses:    Diagnosis Date Noted POA    PRINCIPAL PROBLEM:  COPD exacerbation [J44.1] 07/09/2024 Yes    Hypertensive urgency [I16.0] 07/10/2024 Yes    Essential hypertension [I10] 03/28/2020 Yes     Chronic      Problems Resolved During this Admission:       Discharged Condition: stable    Disposition: Admitted as an Inpatient    Follow Up:   Follow-up Information       Dme, [facility].    Specialties: DME Provider, Orthotics  Why: Contact if you have any questions about your medical equipment.  Contact information:  [address]  [address]  [place] [address] [phone]                           Patient Instructions:      OXYGEN FOR HOME USE     Order Specific Question Answer Comments   Liter Flow 2    Duration With activity    Qualifying Test Performed at: Activity    Oxygen saturation at rest 88    Oxygen saturation with activity 86    Oxygen saturation with activity on oxygen 92    Portable mode: continuous    Route nasal cannula    Device: home concentrator with portable tanks    Length of need (in months): 99 mos   "  Patient condition with qualifying saturation COPD    Height: 5' 4" (1.626 m)    Weight: 37.7 kg (83 lb 1.6 oz)    Alternative treatment measures have been tried or considered and deemed clinically ineffective. Yes      Ambulatory referral/consult to Pulmonology   Standing Status: Future   Referral Priority: Routine Referral Type: Consultation   Referral Reason: Specialty Services Required   Requested Specialty: Pulmonary Disease   Number of Visits Requested: 1     Notify your health care provider if you experience any of the following:  temperature >100.4     Notify your health care provider if you experience any of the following:  severe uncontrolled pain     Notify your health care provider if you experience any of the following:  redness, tenderness, or signs of infection (pain, swelling, redness, odor or green/yellow discharge around incision site)     Notify your health care provider if you experience any of the following:  worsening rash     Notify your health care provider if you experience any of the following:  difficulty breathing or increased cough     Activity as tolerated       Significant Diagnostic Studies: Radiology: X-Ray: CXR: No interval detrimental change from comparison radiograph.   Covid and Flu negative    Pending Diagnostic Studies:       None           Medications:  Reconciled Home Medications:      Medication List        START taking these medications      predniSONE 20 MG tablet  Commonly known as: DELTASONE  Take 2 tablets (40 mg total) by mouth once daily. for 3 days  Start taking on: November 12, 2024            CONTINUE taking these medications      acetaminophen 500 MG tablet  Commonly known as: TYLENOL  Take 500 mg by mouth 2 (two) times daily as needed for Pain.     amlodipine-benazepril 10-20mg 10-20 mg per capsule  Commonly known as: LOTREL  Take 1 capsule by mouth every morning.     ARICEPT 5 mg tablet  Generic drug: donepeziL  Take 5 mg by mouth every evening.     ARTHRITIS " TOP  Apply topically daily as needed (Pain).     lactulose 10 gram/15 ml 10 gram/15 mL (15 mL) solution  Commonly known as: CHRONULAC  Take 10 g by mouth 2 (two) times daily as needed (constipation).     VITAMIN B-12 ORAL  Take 1 tablet by mouth once daily.     WIXELA INHUB 250-50 mcg/dose diskus inhaler  Generic drug: fluticasone-salmeterol 250-50 mcg/dose  Inhale 1 puff into the lungs Daily.            STOP taking these medications      albuterol 90 mcg/actuation inhaler  Commonly known as: PROVENTIL/VENTOLIN HFA            ASK your doctor about these medications      atorvastatin 10 MG tablet  Commonly known as: LIPITOR  Take 10 mg by mouth once daily.     chlorthalidone 25 MG Tab  Commonly known as: HYGROTEN  Take 25 mg by mouth once daily.              Indwelling Lines/Drains at time of discharge:   Lines/Drains/Airways       None                   Time spent on the discharge of patient: >35 minutes         Sujey Stokes PA-C  Department of Hospital Medicine  [facility] ([place])

## 2024-11-11 NOTE — PLAN OF CARE
MOON Message     Medicare Outpatient and Observation Notification regarding financial responsibility Explained to patient/caregiver; Other (comments)Medicare Outpatient and Observation Notification regarding financial responsibility. Explained to patient/caregiver; Other (comments). The comment is Verbal Consent. Taken on 11/11/24 1302   Date REBOLLEDO was signed 11/11/2024   Time REBOLLEDO was signed 1110

## 2024-11-11 NOTE — ASSESSMENT & PLAN NOTE
Gloria Garcia presents with shortness of breath that started this morning, 77% ORA per EMS    - sats stable at rest, 82% with exertion requiring 2L NC  - flu and covid negative  - CXR unremarkable  - ABG reassuring, normal pH and pO2/pCO2  - feeling better after IV solumedrol and duonebs  - continue daily prednisone, duonebs, IS, mucinex  - has asthma history, will give IV mg x 1  - symptoms improved with above measured, requesting discharge which is reasonable  - 6MWT prior to dc: 88% at rest, 2L NC needed with exertion  Stable for dc with prednsione and home oxygen  - pulmonology referral at dc

## 2024-11-11 NOTE — PLAN OF CARE
LMSW spoke with patient's niece. Patient denies the use of HH. Patient has a nebulizer. Patient choice pharmacy is bedside. Patient does not have transportation as her niece does not get off of work till after 5 PM.     [facility]  Initial Discharge Assessment       Primary Care Provider: Hailey, Primary Doctor    Admission Diagnosis: SOB (shortness of breath) [R06.02]  COPD exacerbation [J44.1]    Admission Date: 11/10/2024  Expected Discharge Date: 11/11/2024    Transition of Care Barriers: (P) None    Payor: [employer] MEDICARE / Plan: [employer] H[employer] PPO SPECIAL NEEDS / Product Type: Medicare Advantage /     Extended Emergency Contact Information  Primary Emergency Contact: Meseret Christensen  Mobile Phone: [phone]  Relation: Relative  Secondary Emergency Contact: Lynn Christensen  Mobile Phone: [phone]  Relation: Relative    Discharge Plan A: (P) Home with family, Home  Discharge Plan B: (P) Home Health      [facility] [facility] [id number] [address] [facility] [address]  [address]  [facility] [address]  Phone: [phone] Fax: [phone]      Initial Assessment (most recent)       Adult Discharge Assessment - 11/11/24 1111          Discharge Assessment    Assessment Type Discharge Planning Assessment (P)      Confirmed/corrected address, phone number and insurance Yes (P)      Confirmed Demographics Correct on Facesheet (P)      Source of Information patient;family;health record (P)      People in Home other relative(s) (P)      Do you expect to return to your current living situation? Yes (P)      Do you have help at home or someone to help you manage your care at home? Yes (P)      Prior to hospitilization cognitive status: Unable to Assess (P)      Current cognitive status: Unable to Assess (P)      Equipment Currently Used at Home nebulizer (P)      Readmission within 30 days? No (P)      Patient currently being followed by  outpatient case management? No (P)      Do you currently have service(s) that help you manage your care at home? No (P)      Do you take prescription medications? Yes (P)      Do you have prescription coverage? Yes (P)      Do you have any problems affording any of your prescribed medications? No (P)      Is the patient taking medications as prescribed? yes (P)      How do you get to doctors appointments? family or friend will provide (P)      Are you on dialysis? No (P)      Do you take coumadin? No (P)      Discharge Plan A Home with family;Home (P)      Discharge Plan B Home Health (P)      Discharge Plan discussed with: Caregiver (P)      Name(s) and Number(s) Meseret Christensen (Relative)  [phone] (P)      Transition of Care Barriers None (P)

## 2024-11-11 NOTE — NURSING
Patient discharge education completed. Patient verbalizes understanding. Niece notified of patient discharge. Will note any changes.

## 2024-11-11 NOTE — PLAN OF CARE
[facility] delivered Oxygen Tank to patient. Patient was given Education sheets and signed delivery ticket

## 2024-11-11 NOTE — PLAN OF CARE
11/11/24 1017   Rounds   Attendance Provider;   Discharge Plan A Home;Home with family   Why the patient remains in the hospital   (Patient will dc today)   Transition of Care Barriers None

## 2024-11-11 NOTE — PLAN OF CARE
Case Management Final Discharge Note      Discharge Disposition: Home with Family     New DME ordered / company name: [facility] from [facility]     Relevant SDOH / Transition of Care Barriers:  Transportation     Primary Caretaker and contact information: Meseret     Scheduled followup appointment: Pulm     Referrals placed: NA    Transportation: wheelchair van scheduled for 2:30     Patient and family educated on discharge services and updated on DC plan. Bedside RN notified, patient clear to discharge from Case Management Perspective.   [facility]  Discharge Final Note    Primary Care Provider: Hailey, Primary Doctor    Expected Discharge Date: 11/11/2024    Final Discharge Note (most recent)       Final Note - 11/11/24 1232          Final Note    Assessment Type Final Discharge Note     Anticipated Discharge Disposition Home or Self Care     Hospital Resources/Appts/Education Provided Provided patient/caregiver with written discharge plan information;Appointments scheduled and added to AVS        Post-Acute Status    Post-Acute Authorization Other     Other Status No Post-Acute Service Needs     Discharge Delays None known at this time                     Contact Info       [facility]   Specialty: DME Provider, Orthotics    [address] [place] [address]   Phone: [phone]       Next Steps: Follow up    Instructions: Contact if you have any questions about your medical equipment.

## 2024-11-19 ENCOUNTER — TELEPHONE (OUTPATIENT)
Dept: PULMONOLOGY | Facility: CLINIC | Age: 89
End: 2024-11-19
Payer: MEDICARE

## 2024-11-19 NOTE — TELEPHONE ENCOUNTER
----- Message from [place] sent at 11/19/2024 12:58 PM CST -----  Regarding: APPOINTMENT  Contact: Juan Carlos/daughter [phone]  Calling to schedule an appointment per referral as soon as possible. Please call patient's daughter Lynn to schedule today.    [phone]

## 2024-11-20 ENCOUNTER — HOSPITAL ENCOUNTER (INPATIENT)
Facility: OTHER | Age: 89
LOS: 1 days | Discharge: HOME HEALTH | DRG: 191 | End: 2024-11-22
Admitting: INTERNAL MEDICINE
Payer: MEDICARE

## 2024-11-20 DIAGNOSIS — J45.901 EXACERBATION OF ASTHMA, UNSPECIFIED ASTHMA SEVERITY, UNSPECIFIED WHETHER PERSISTENT: Primary | ICD-10-CM

## 2024-11-20 DIAGNOSIS — R06.02 SOB (SHORTNESS OF BREATH): ICD-10-CM

## 2024-11-20 DIAGNOSIS — R74.8 ELEVATED LIPASE: ICD-10-CM

## 2024-11-20 DIAGNOSIS — R10.9 ABDOMINAL PAIN, UNSPECIFIED ABDOMINAL LOCATION: ICD-10-CM

## 2024-11-20 DIAGNOSIS — J96.11 CHRONIC HYPOXIC RESPIRATORY FAILURE, ON HOME OXYGEN THERAPY: ICD-10-CM

## 2024-11-20 DIAGNOSIS — Z99.81 CHRONIC HYPOXIC RESPIRATORY FAILURE, ON HOME OXYGEN THERAPY: ICD-10-CM

## 2024-11-20 PROBLEM — J44.89 ASTHMA-COPD OVERLAP SYNDROME: Status: ACTIVE | Noted: 2024-11-20

## 2024-11-20 PROBLEM — Z87.891 FORMER SMOKER: Status: ACTIVE | Noted: 2024-11-20

## 2024-11-20 LAB
ALBUMIN SERPL BCP-MCNC: 3.9 G/DL (ref 3.5–5.2)
ALLENS TEST: ABNORMAL
ALP SERPL-CCNC: 46 U/L (ref 40–150)
ALT SERPL W/O P-5'-P-CCNC: 12 U/L (ref 10–44)
ANION GAP SERPL CALC-SCNC: 11 MMOL/L (ref 8–16)
AST SERPL-CCNC: 23 U/L (ref 10–40)
BASOPHILS # BLD AUTO: 0.03 K/UL (ref 0–0.2)
BASOPHILS NFR BLD: 0.5 % (ref 0–1.9)
BILIRUB SERPL-MCNC: 0.3 MG/DL (ref 0.1–1)
BILIRUB UR QL STRIP: NEGATIVE
BNP SERPL-MCNC: 107 PG/ML (ref 0–99)
BUN SERPL-MCNC: 18 MG/DL (ref 8–23)
CALCIUM SERPL-MCNC: 9.3 MG/DL (ref 8.7–10.5)
CHLORIDE SERPL-SCNC: 109 MMOL/L (ref 95–110)
CLARITY UR: CLEAR
CO2 SERPL-SCNC: 23 MMOL/L (ref 23–29)
COLOR UR: YELLOW
CREAT SERPL-MCNC: 1.4 MG/DL (ref 0.5–1.4)
DELSYS: ABNORMAL
DIFFERENTIAL METHOD BLD: ABNORMAL
EOSINOPHIL # BLD AUTO: 0.3 K/UL (ref 0–0.5)
EOSINOPHIL NFR BLD: 5.1 % (ref 0–8)
ERYTHROCYTE [DISTWIDTH] IN BLOOD BY AUTOMATED COUNT: 14.2 % (ref 11.5–14.5)
EST. GFR  (NO RACE VARIABLE): 36 ML/MIN/1.73 M^2
GLUCOSE SERPL-MCNC: 112 MG/DL (ref 70–110)
GLUCOSE UR QL STRIP: NEGATIVE
HCO3 UR-SCNC: 30 MMOL/L (ref 24–28)
HCT VFR BLD AUTO: 36.3 % (ref 37–48.5)
HCT VFR BLD CALC: 35 %PCV (ref 36–54)
HGB BLD-MCNC: 11.2 G/DL (ref 12–16)
HGB BLD-MCNC: 12 G/DL
HGB UR QL STRIP: NEGATIVE
IMM GRANULOCYTES # BLD AUTO: 0.01 K/UL (ref 0–0.04)
IMM GRANULOCYTES NFR BLD AUTO: 0.2 % (ref 0–0.5)
KETONES UR QL STRIP: NEGATIVE
LEUKOCYTE ESTERASE UR QL STRIP: NEGATIVE
LIPASE SERPL-CCNC: 94 U/L (ref 4–60)
LYMPHOCYTES # BLD AUTO: 1.5 K/UL (ref 1–4.8)
LYMPHOCYTES NFR BLD: 25.8 % (ref 18–48)
MAGNESIUM SERPL-MCNC: 2.2 MG/DL (ref 1.6–2.6)
MAGNESIUM SERPL-MCNC: 2.2 MG/DL (ref 1.6–2.6)
MCH RBC QN AUTO: 27.7 PG (ref 27–31)
MCHC RBC AUTO-ENTMCNC: 30.9 G/DL (ref 32–36)
MCV RBC AUTO: 90 FL (ref 82–98)
MONOCYTES # BLD AUTO: 0.6 K/UL (ref 0.3–1)
MONOCYTES NFR BLD: 9.8 % (ref 4–15)
NEUTROPHILS # BLD AUTO: 3.4 K/UL (ref 1.8–7.7)
NEUTROPHILS NFR BLD: 58.6 % (ref 38–73)
NITRITE UR QL STRIP: NEGATIVE
NRBC BLD-RTO: 0 /100 WBC
PCO2 BLDA: 45.4 MMHG (ref 35–45)
PH SMN: 7.43 [PH] (ref 7.35–7.45)
PH UR STRIP: 6 [PH] (ref 5–8)
PLATELET # BLD AUTO: 323 K/UL (ref 150–450)
PMV BLD AUTO: 9.8 FL (ref 9.2–12.9)
PO2 BLDA: 28 MMHG (ref 40–60)
POC BE: 6 MMOL/L
POC SATURATED O2: 55 % (ref 95–100)
POC TCO2: 31 MMOL/L (ref 24–29)
POTASSIUM SERPL-SCNC: 5.4 MMOL/L (ref 3.5–5.1)
PROT SERPL-MCNC: 7.4 G/DL (ref 6–8.4)
PROT UR QL STRIP: ABNORMAL
RBC # BLD AUTO: 4.05 M/UL (ref 4–5.4)
SAMPLE: ABNORMAL
SITE: ABNORMAL
SODIUM SERPL-SCNC: 143 MMOL/L (ref 136–145)
SP GR UR STRIP: >1.03 (ref 1–1.03)
TROPONIN I SERPL DL<=0.01 NG/ML-MCNC: 0.02 NG/ML (ref 0–0.03)
URN SPEC COLLECT METH UR: ABNORMAL
UROBILINOGEN UR STRIP-ACNC: NEGATIVE EU/DL
WBC # BLD AUTO: 5.74 K/UL (ref 3.9–12.7)

## 2024-11-20 PROCEDURE — 96365 THER/PROPH/DIAG IV INF INIT: CPT

## 2024-11-20 PROCEDURE — 80053 COMPREHEN METABOLIC PANEL: CPT

## 2024-11-20 PROCEDURE — 94799 UNLISTED PULMONARY SVC/PX: CPT

## 2024-11-20 PROCEDURE — 82803 BLOOD GASES ANY COMBINATION: CPT

## 2024-11-20 PROCEDURE — 93010 ELECTROCARDIOGRAM REPORT: CPT | Mod: ,,, | Performed by: INTERNAL MEDICINE

## 2024-11-20 PROCEDURE — 83690 ASSAY OF LIPASE: CPT

## 2024-11-20 PROCEDURE — 94640 AIRWAY INHALATION TREATMENT: CPT | Mod: XB

## 2024-11-20 PROCEDURE — G0378 HOSPITAL OBSERVATION PER HR: HCPCS

## 2024-11-20 PROCEDURE — 81003 URINALYSIS AUTO W/O SCOPE: CPT

## 2024-11-20 PROCEDURE — 25000003 PHARM REV CODE 250: Performed by: NURSE PRACTITIONER

## 2024-11-20 PROCEDURE — 96372 THER/PROPH/DIAG INJ SC/IM: CPT | Performed by: NURSE PRACTITIONER

## 2024-11-20 PROCEDURE — 63600175 PHARM REV CODE 636 W HCPCS

## 2024-11-20 PROCEDURE — 96375 TX/PRO/DX INJ NEW DRUG ADDON: CPT

## 2024-11-20 PROCEDURE — 25000242 PHARM REV CODE 250 ALT 637 W/ HCPCS

## 2024-11-20 PROCEDURE — 63600175 PHARM REV CODE 636 W HCPCS: Performed by: NURSE PRACTITIONER

## 2024-11-20 PROCEDURE — 83735 ASSAY OF MAGNESIUM: CPT

## 2024-11-20 PROCEDURE — 93005 ELECTROCARDIOGRAM TRACING: CPT

## 2024-11-20 PROCEDURE — 99285 EMERGENCY DEPT VISIT HI MDM: CPT | Mod: 25

## 2024-11-20 PROCEDURE — 25000242 PHARM REV CODE 250 ALT 637 W/ HCPCS: Performed by: NURSE PRACTITIONER

## 2024-11-20 PROCEDURE — 99900035 HC TECH TIME PER 15 MIN (STAT)

## 2024-11-20 PROCEDURE — 94761 N-INVAS EAR/PLS OXIMETRY MLT: CPT | Mod: XB

## 2024-11-20 PROCEDURE — 25500020 PHARM REV CODE 255

## 2024-11-20 PROCEDURE — 83880 ASSAY OF NATRIURETIC PEPTIDE: CPT

## 2024-11-20 PROCEDURE — 84484 ASSAY OF TROPONIN QUANT: CPT

## 2024-11-20 PROCEDURE — 85025 COMPLETE CBC W/AUTO DIFF WBC: CPT

## 2024-11-20 RX ORDER — IPRATROPIUM BROMIDE AND ALBUTEROL SULFATE 2.5; .5 MG/3ML; MG/3ML
3 SOLUTION RESPIRATORY (INHALATION)
Status: COMPLETED | OUTPATIENT
Start: 2024-11-20 | End: 2024-11-20

## 2024-11-20 RX ORDER — GUAIFENESIN AND DEXTROMETHORPHAN HYDROBROMIDE 10; 100 MG/5ML; MG/5ML
10 SYRUP ORAL EVERY 4 HOURS PRN
Status: DISCONTINUED | OUTPATIENT
Start: 2024-11-20 | End: 2024-11-22 | Stop reason: HOSPADM

## 2024-11-20 RX ORDER — HYDRALAZINE HYDROCHLORIDE 25 MG/1
25 TABLET, FILM COATED ORAL EVERY 8 HOURS PRN
Status: DISCONTINUED | OUTPATIENT
Start: 2024-11-20 | End: 2024-11-22 | Stop reason: HOSPADM

## 2024-11-20 RX ORDER — ONDANSETRON 8 MG/1
8 TABLET, ORALLY DISINTEGRATING ORAL EVERY 8 HOURS PRN
Status: DISCONTINUED | OUTPATIENT
Start: 2024-11-20 | End: 2024-11-22 | Stop reason: HOSPADM

## 2024-11-20 RX ORDER — DONEPEZIL HYDROCHLORIDE 5 MG/1
5 TABLET, FILM COATED ORAL NIGHTLY
Status: DISCONTINUED | OUTPATIENT
Start: 2024-11-20 | End: 2024-11-22 | Stop reason: HOSPADM

## 2024-11-20 RX ORDER — ENOXAPARIN SODIUM 100 MG/ML
30 INJECTION SUBCUTANEOUS EVERY 24 HOURS
Status: DISCONTINUED | OUTPATIENT
Start: 2024-11-20 | End: 2024-11-22 | Stop reason: HOSPADM

## 2024-11-20 RX ORDER — IPRATROPIUM BROMIDE AND ALBUTEROL SULFATE 2.5; .5 MG/3ML; MG/3ML
SOLUTION RESPIRATORY (INHALATION)
Status: COMPLETED
Start: 2024-11-20 | End: 2024-11-22

## 2024-11-20 RX ORDER — METHYLPREDNISOLONE SOD SUCC 125 MG
125 VIAL (EA) INJECTION
Status: COMPLETED | OUTPATIENT
Start: 2024-11-20 | End: 2024-11-20

## 2024-11-20 RX ORDER — IPRATROPIUM BROMIDE AND ALBUTEROL SULFATE 2.5; .5 MG/3ML; MG/3ML
3 SOLUTION RESPIRATORY (INHALATION)
Status: DISCONTINUED | OUTPATIENT
Start: 2024-11-20 | End: 2024-11-22 | Stop reason: HOSPADM

## 2024-11-20 RX ORDER — TALC
6 POWDER (GRAM) TOPICAL NIGHTLY PRN
Status: DISCONTINUED | OUTPATIENT
Start: 2024-11-20 | End: 2024-11-22 | Stop reason: HOSPADM

## 2024-11-20 RX ORDER — AMOXICILLIN 250 MG
1 CAPSULE ORAL 2 TIMES DAILY
Status: DISCONTINUED | OUTPATIENT
Start: 2024-11-20 | End: 2024-11-22 | Stop reason: HOSPADM

## 2024-11-20 RX ORDER — LISINOPRIL 20 MG/1
20 TABLET ORAL DAILY
Status: DISCONTINUED | OUTPATIENT
Start: 2024-11-21 | End: 2024-11-22 | Stop reason: HOSPADM

## 2024-11-20 RX ORDER — PREDNISONE 20 MG/1
40 TABLET ORAL DAILY
Status: DISCONTINUED | OUTPATIENT
Start: 2024-11-21 | End: 2024-11-22 | Stop reason: HOSPADM

## 2024-11-20 RX ORDER — POLYETHYLENE GLYCOL 3350 17 G/17G
17 POWDER, FOR SOLUTION ORAL 2 TIMES DAILY PRN
Status: DISCONTINUED | OUTPATIENT
Start: 2024-11-20 | End: 2024-11-22 | Stop reason: HOSPADM

## 2024-11-20 RX ORDER — SODIUM CHLORIDE 0.9 % (FLUSH) 0.9 %
3 SYRINGE (ML) INJECTION
Status: DISCONTINUED | OUTPATIENT
Start: 2024-11-20 | End: 2024-11-22 | Stop reason: HOSPADM

## 2024-11-20 RX ORDER — FLUTICASONE FUROATE AND VILANTEROL 100; 25 UG/1; UG/1
1 POWDER RESPIRATORY (INHALATION) DAILY
Status: DISCONTINUED | OUTPATIENT
Start: 2024-11-21 | End: 2024-11-22 | Stop reason: HOSPADM

## 2024-11-20 RX ORDER — MAGNESIUM SULFATE HEPTAHYDRATE 40 MG/ML
2 INJECTION, SOLUTION INTRAVENOUS ONCE
Status: COMPLETED | OUTPATIENT
Start: 2024-11-20 | End: 2024-11-20

## 2024-11-20 RX ORDER — AMLODIPINE BESYLATE 5 MG/1
10 TABLET ORAL DAILY
Status: DISCONTINUED | OUTPATIENT
Start: 2024-11-21 | End: 2024-11-22 | Stop reason: HOSPADM

## 2024-11-20 RX ORDER — ACETAMINOPHEN 325 MG/1
650 TABLET ORAL EVERY 4 HOURS PRN
Status: DISCONTINUED | OUTPATIENT
Start: 2024-11-20 | End: 2024-11-22 | Stop reason: HOSPADM

## 2024-11-20 RX ADMIN — IPRATROPIUM BROMIDE AND ALBUTEROL SULFATE 3 ML: .5; 3 SOLUTION RESPIRATORY (INHALATION) at 03:11

## 2024-11-20 RX ADMIN — METHYLPREDNISOLONE SODIUM SUCCINATE 125 MG: 125 INJECTION, POWDER, FOR SOLUTION INTRAMUSCULAR; INTRAVENOUS at 03:11

## 2024-11-20 RX ADMIN — MELATONIN TAB 3 MG 6 MG: 3 TAB at 09:11

## 2024-11-20 RX ADMIN — DONEPEZIL HYDROCHLORIDE 5 MG: 5 TABLET, FILM COATED ORAL at 09:11

## 2024-11-20 RX ADMIN — MAGNESIUM SULFATE HEPTAHYDRATE 2 G: 40 INJECTION, SOLUTION INTRAVENOUS at 03:11

## 2024-11-20 RX ADMIN — ENOXAPARIN SODIUM 30 MG: 30 INJECTION SUBCUTANEOUS at 09:11

## 2024-11-20 RX ADMIN — SENNOSIDES AND DOCUSATE SODIUM 1 TABLET: 50; 8.6 TABLET ORAL at 09:11

## 2024-11-20 RX ADMIN — IOHEXOL 75 ML: 350 INJECTION, SOLUTION INTRAVENOUS at 04:11

## 2024-11-20 RX ADMIN — IPRATROPIUM BROMIDE AND ALBUTEROL SULFATE 3 ML: 2.5; .5 SOLUTION RESPIRATORY (INHALATION) at 08:11

## 2024-11-20 NOTE — ED PROVIDER NOTES
Encounter Date: 2024       History     Chief Complaint   Patient presents with    Shortness of Breath     Pt brought in by [facility] with c/o SOB. Family called-- when fire arrived, pt oxygen was in the 80's. Pt placed on non rebreather and given duoneb with improvement. Pt 100% on 2L NC upon arrival. Hx of copd and dementia. Audible wheezing heard     Patient is a 89 year old female with past medical history of mixed COPD/Asthma that presents to the ER with hypoxia and shortness of breath. Brought into the ER via EMS. On there first evaluation patient was hypoxic and drowsy. Placed on non-rebreather without improvement, after 1 douneb patient had improvement in O2 saturation to mid 90s. Patient speaking in 1 to 2 words sentences on arrival and history is limited. In addition to shortness of breath patient has abdominal pain.     The history is provided by the patient, the EMS personnel and medical records. The history is limited by the condition of the patient.     Review of patient's allergies indicates:  No Known Allergies  Past Medical History:   Diagnosis Date    Asthma     COPD exacerbation 2024    Hypertension      Past Surgical History:   Procedure Laterality Date    HYSTERECTOMY       Family History   Problem Relation Name Age of Onset    Hypertension Mother      Stroke Father       Social History     Tobacco Use    Smoking status: Former     Current packs/day: 0.00     Types: Cigarettes     Quit date: 3/28/1995     Years since quittin.6    Smokeless tobacco: Never   Substance Use Topics    Alcohol use: Yes     Comment: occasional/social    Drug use: Never     Review of Systems  ROS negative except as noted in HPI     Physical Exam     Initial Vitals   BP Pulse Resp Temp SpO2   24 1523 24 1523 24 1524 24 1524 24 1523   (!) 238/106 106 (!) 23 98.5 °F (36.9 °C) 99 %      MAP       --                Physical Exam    Nursing note and vitals reviewed.  Constitutional:  She is diaphoretic. She appears distressed.   HENT:   Head: Normocephalic.   Right Ear: External ear normal.   Left Ear: External ear normal.   Nose: Nose normal. Mouth/Throat: Oropharynx is clear and moist.   Eyes: Conjunctivae are normal.   Cardiovascular:  Regular rhythm, S1 normal and S2 normal.   Tachycardia present.         Pulmonary/Chest: She is in respiratory distress. She has wheezes (inspiratory and expiratory).   Abdominal: There is abdominal tenderness. There is guarding (Diffuse).     Neurological: She is alert.   Skin: Capillary refill takes less than 2 seconds.           ED Course   Procedures  Labs Reviewed   CBC W/ AUTO DIFFERENTIAL - Abnormal       Result Value    WBC 5.74      RBC 4.05      Hemoglobin 11.2 (*)     Hematocrit 36.3 (*)     MCV 90      MCH 27.7      MCHC 30.9 (*)     RDW 14.2      Platelets 323      MPV 9.8      Immature Granulocytes 0.2      Gran # (ANC) 3.4      Immature Grans (Abs) 0.01      Lymph # 1.5      Mono # 0.6      Eos # 0.3      Baso # 0.03      nRBC 0      Gran % 58.6      Lymph % 25.8      Mono % 9.8      Eosinophil % 5.1      Basophil % 0.5      Differential Method Automated     COMPREHENSIVE METABOLIC PANEL - Abnormal    Sodium 143      Potassium 5.4 (*)     Chloride 109      CO2 23      Glucose 112 (*)     BUN 18      Creatinine 1.4      Calcium 9.3      Total Protein 7.4      Albumin 3.9      Total Bilirubin 0.3      Alkaline Phosphatase 46      AST 23      ALT 12      eGFR 36 (*)     Anion Gap 11     URINALYSIS, REFLEX TO URINE CULTURE - Abnormal    Specimen UA Urine, Clean Catch      Color, UA Yellow      Appearance, UA Clear      pH, UA 6.0      Specific Gravity, UA >1.030 (*)     Protein, UA Trace (*)     Glucose, UA Negative      Ketones, UA Negative      Bilirubin (UA) Negative      Occult Blood UA Negative      Nitrite, UA Negative      Urobilinogen, UA Negative      Leukocytes, UA Negative      Narrative:     Specimen Source->Urine   B-TYPE NATRIURETIC  PEPTIDE - Abnormal     (*)    LIPASE - Abnormal    Lipase 94 (*)    ISTAT PROCEDURE - Abnormal    POC PH 7.429      POC PCO2 45.4 (*)     POC PO2 28 (*)     POC HCO3 30.0 (*)     POC BE 6 (*)     POC SATURATED O2 55      POC TCO2 31 (*)     POC Hematocrit 35 (*)     POC HEMOGLOBIN 12      Sample VENOUS      Site Other      Allens Test N/A      DelSys Nasal Can     MAGNESIUM    Magnesium 2.2     TROPONIN I    Troponin I 0.019     MAGNESIUM    Magnesium 2.2            Imaging Results              CT Abdomen Pelvis With IV Contrast NO Oral Contrast (Final result)  Result time 11/20/24 18:03:59      Final result by Radha Womack MD (11/20/24 18:03:59)                   Impression:      No acute abdominal or pelvic pathology CT of the abdomen and pelvis with contrast.    Bilateral renal cortical scarring left greater than right.  Indeterminate area of low attenuation in the both kidneys.  Findings may be related to scarring rather than a discrete lesions.  If warranted, consider interval follow-up.    Advanced vascular calcifications.      Electronically signed by: Radha Womack  Date:    11/20/2024  Time:    18:03               Narrative:    EXAMINATION:  CT OF ABDOMEN PELVIS WITH    CLINICAL HISTORY:  Abdominal pain, acute, nonlocalized;    TECHNIQUE:  5 mm enhanced axial images were obtained from the lung bases through the greater trochanters.  Seventy-five mL of Omnipaque 350 was injected.    COMPARISON:  None.    FINDINGS:  There are calcified granulomas in the liver.  There is no intrahepatic biliary ductal dilatation.    Spleen and adrenal glands are unremarkable. The gallbladder contains no calcified gallstones.  The stomach is elongated.    The pancreas does not appear to be enlarged.  No pancreatic pseudocysts are detected.    There is bilateral renal cortical scarring.  There is an area of low attenuation at the periphery of the right renal cortex measuring 10 x 7 x 19 mm (series 2 axial image  54 and series 601 coronal image 57).  It measures greater than simple fluid.  The scarred left kidney is atrophic.  There is an area of low attenuation at the upper pole of the left kidney measuring 9 x 8 x 13 mm (series 2 axial image 30 and series 601 coronal image 45).  It also measures greater than simple fluid.    There is no definite evidence for abdominal adenopathy or ascites.  Advanced vascular calcifications are present.  The common iliac arteries and right common femoral artery are quite stenotic.    There are no pelvic masses or adenopathy.  The patulous rectum contains moderate stool.  The uterus is surgically absent.    There is no free fluid in the pelvis.    There is mild right basilar scarring or linear atelectasis.    There is mild levoscoliosis.  There is severe degenerative change of the left hip greater than the right hip.  Bones are osteopenic.                                       X-Ray Chest AP Portable (Final result)  Result time 11/20/24 16:26:12      Final result by Rusty Bailey MD (11/20/24 16:26:12)                   Impression:      No acute abnormality.      Electronically signed by: Rusty Bailey MD  Date:    11/20/2024  Time:    16:26               Narrative:    EXAMINATION:  XR CHEST AP PORTABLE    CLINICAL HISTORY:  Chest Pain;    TECHNIQUE:  Single frontal view of the chest was performed.    COMPARISON:  Prior exams dating back to 2020    FINDINGS:  Lungs are clear.  No new focal consolidation.    No effusion or pneumothorax.    Unchanged cardiomediastinal silhouette.    No acute bone abnormality.                                       Medications   albuterol-ipratropium (DUO-NEB) 2.5 mg-0.5 mg/3 mL nebulizer solution (  Canceled Entry 11/20/24 6567)   amLODIPine tablet 10 mg (has no administration in time range)   lisinopriL tablet 20 mg (has no administration in time range)   donepeziL tablet 5 mg (5 mg Oral Given 11/20/24 2130)   fluticasone furoate-vilanteroL 100-25  mcg/dose diskus inhaler 1 puff (has no administration in time range)   sodium chloride 0.9% flush 3 mL (has no administration in time range)   predniSONE tablet 40 mg (has no administration in time range)   albuterol-ipratropium 2.5 mg-0.5 mg/3 mL nebulizer solution 3 mL (3 mLs Nebulization Given 11/20/24 2030)   enoxaparin injection 30 mg (30 mg Subcutaneous Given 11/20/24 2129)   melatonin tablet 6 mg (6 mg Oral Given 11/20/24 2129)   ondansetron disintegrating tablet 8 mg (has no administration in time range)   senna-docusate 8.6-50 mg per tablet 1 tablet (1 tablet Oral Given 11/20/24 2130)   polyethylene glycol packet 17 g (has no administration in time range)   acetaminophen tablet 650 mg (has no administration in time range)   dextromethorphan-guaiFENesin  mg/5 ml liquid 10 mL (has no administration in time range)   hydrALAZINE tablet 25 mg (has no administration in time range)   albuterol-ipratropium 2.5 mg-0.5 mg/3 mL nebulizer solution 3 mL (3 mLs Nebulization Given 11/20/24 1553)   methylPREDNISolone sodium succinate injection 125 mg (125 mg Intravenous Given 11/20/24 1547)   magnesium sulfate 2g in water 50mL IVPB (premix) (0 g Intravenous Stopped 11/20/24 1629)   iohexoL (OMNIPAQUE 350) injection 75 mL (75 mLs Intravenous Given 11/20/24 1651)     Medical Decision Making  Patient is a 89 year old female with past medical history of mixed COPD/Asthma that presents to the ER with hypoxia and shortness of breath.     On initial evaluation patient has audible wheezing, tachypnea and tachycardia. She was hypertensive and speaking in 1 to 2 word sentences.     Rapidly put on supplemental oxygen and respiratory at bedside for further breathing treatments. History and presentation consistent with COPD/Asthma exacerbation. Additionally patient has diffuse tenderness to palpation through out abdomen, patient is unable to provide and further information given respiratory distress, thus differential is broad.  Will evaluation with metabolic work up and CT abdomen pelvis.     ED course.    Given the frequency and quantity of breathing treatments given in the emergency department patient meets criteria for inpatient management of her current COPD/asthma exacerbation.  Patient high-risk for rapid decompensation given poor medical understanding, limited resources and limited social support structure.    Additionally patient would benefit from further monitoring epigastric pain and elevated lipase which did not resolve in the emergency department despite controlling current respiratory exacerbation.    Amount and/or Complexity of Data Reviewed  Labs: ordered. Decision-making details documented in ED Course.  Radiology: ordered.  ECG/medicine tests:  Decision-making details documented in ED Course.    Risk  Prescription drug management.              Attending Attestation:   Physician Attestation Statement for Resident:  As the supervising MD   Physician Attestation Statement: I have personally seen and examined this patient.   I agree with the above history.  -:   As the supervising MD I agree with the above PE.     As the supervising MD I agree with the above treatment, course, plan, and disposition.                    ED Course as of 11/21/24 0008   Wed Nov 20, 2024   1548 EKG 12-lead  EKG independently interpreted by me demonstrates Normal sinus rhythm, no ST elevations or depression. Normal intervals.  [TK]   1613 ISTAT PROCEDURE(!!)  Mild elevation of CO2, but no evidence of respiratory acidosis [TK]   1613 Potassium(!): 5.4  Moderately hemolyzed, no EKG findings of hyperkalemia  [TK]   1614 Sodium: 143 [TK]   1614 BUN: 18 [TK]   1614 Creatinine: 1.4 [TK]   1614 ALP: 46 [TK]   1615 AST: 23 [TK]   1615 ALT: 12 [TK]   1615 WBC: 5.74 [TK]   1615 Hemoglobin(!): 11.2 [TK]   1615 Hematocrit(!): 36.3 [TK]   1615 Platelet Count: 323 [TK]   1903 Urinalysis, Reflex to Urine Culture Urine, Clean Catch(!)  No UTI [TK]      ED Course  User Index  [TK] Suzanne Marie DO                             Clinical Impression:  Final diagnoses:  [R06.02] SOB (shortness of breath)  [J45.901] Exacerbation of asthma, unspecified asthma severity, unspecified whether persistent (Primary)  [R74.8] Elevated lipase  [R10.9] Abdominal pain, unspecified abdominal location          ED Disposition Condition    Observation                 Suzanne Marie DO  Resident  11/20/24 3630       Fatou Medrano MD  11/21/24 0008

## 2024-11-20 NOTE — ED TRIAGE NOTES
Pt presents to ED via EMS for respiratory distress/COPD exac. Per EMS, initial SpO2 low 80s with BL on RA. SpO2 Improved to WNL after duoneb en route, wheezing still present. Pt has hx of COPD and dementia- A&O to self only at baseline.

## 2024-11-21 LAB
ALBUMIN SERPL BCP-MCNC: 3.4 G/DL (ref 3.5–5.2)
ALP SERPL-CCNC: 40 U/L (ref 40–150)
ALT SERPL W/O P-5'-P-CCNC: 10 U/L (ref 10–44)
ANION GAP SERPL CALC-SCNC: 12 MMOL/L (ref 8–16)
AST SERPL-CCNC: 12 U/L (ref 10–40)
BASOPHILS # BLD AUTO: 0 K/UL (ref 0–0.2)
BASOPHILS NFR BLD: 0 % (ref 0–1.9)
BILIRUB SERPL-MCNC: 0.4 MG/DL (ref 0.1–1)
BUN SERPL-MCNC: 21 MG/DL (ref 8–23)
CALCIUM SERPL-MCNC: 9.1 MG/DL (ref 8.7–10.5)
CHLORIDE SERPL-SCNC: 107 MMOL/L (ref 95–110)
CO2 SERPL-SCNC: 21 MMOL/L (ref 23–29)
CREAT SERPL-MCNC: 1 MG/DL (ref 0.5–1.4)
DIFFERENTIAL METHOD BLD: ABNORMAL
EOSINOPHIL # BLD AUTO: 0 K/UL (ref 0–0.5)
EOSINOPHIL NFR BLD: 0 % (ref 0–8)
ERYTHROCYTE [DISTWIDTH] IN BLOOD BY AUTOMATED COUNT: 14.2 % (ref 11.5–14.5)
EST. GFR  (NO RACE VARIABLE): 55 ML/MIN/1.73 M^2
GLUCOSE SERPL-MCNC: 136 MG/DL (ref 70–110)
HCT VFR BLD AUTO: 30.7 % (ref 37–48.5)
HGB BLD-MCNC: 9.9 G/DL (ref 12–16)
IMM GRANULOCYTES # BLD AUTO: 0.04 K/UL (ref 0–0.04)
IMM GRANULOCYTES NFR BLD AUTO: 0.9 % (ref 0–0.5)
INFLUENZA A, MOLECULAR: NEGATIVE
INFLUENZA B, MOLECULAR: NEGATIVE
LIPASE SERPL-CCNC: 55 U/L (ref 4–60)
LYMPHOCYTES # BLD AUTO: 0.5 K/UL (ref 1–4.8)
LYMPHOCYTES NFR BLD: 11.1 % (ref 18–48)
MAGNESIUM SERPL-MCNC: 2.6 MG/DL (ref 1.6–2.6)
MCH RBC QN AUTO: 28 PG (ref 27–31)
MCHC RBC AUTO-ENTMCNC: 32.2 G/DL (ref 32–36)
MCV RBC AUTO: 87 FL (ref 82–98)
MONOCYTES # BLD AUTO: 0.1 K/UL (ref 0.3–1)
MONOCYTES NFR BLD: 1.7 % (ref 4–15)
NEUTROPHILS # BLD AUTO: 3.7 K/UL (ref 1.8–7.7)
NEUTROPHILS NFR BLD: 86.3 % (ref 38–73)
NRBC BLD-RTO: 0 /100 WBC
PLATELET # BLD AUTO: 262 K/UL (ref 150–450)
PMV BLD AUTO: 9.9 FL (ref 9.2–12.9)
POTASSIUM SERPL-SCNC: 4.5 MMOL/L (ref 3.5–5.1)
PROT SERPL-MCNC: 6.2 G/DL (ref 6–8.4)
RBC # BLD AUTO: 3.54 M/UL (ref 4–5.4)
SARS-COV-2 RDRP RESP QL NAA+PROBE: NEGATIVE
SODIUM SERPL-SCNC: 140 MMOL/L (ref 136–145)
SPECIMEN SOURCE: NORMAL
WBC # BLD AUTO: 4.23 K/UL (ref 3.9–12.7)

## 2024-11-21 PROCEDURE — 21400001 HC TELEMETRY ROOM

## 2024-11-21 PROCEDURE — 87635 SARS-COV-2 COVID-19 AMP PRB: CPT | Performed by: PHYSICIAN ASSISTANT

## 2024-11-21 PROCEDURE — 85025 COMPLETE CBC W/AUTO DIFF WBC: CPT | Performed by: NURSE PRACTITIONER

## 2024-11-21 PROCEDURE — 83735 ASSAY OF MAGNESIUM: CPT | Performed by: NURSE PRACTITIONER

## 2024-11-21 PROCEDURE — 83690 ASSAY OF LIPASE: CPT | Performed by: NURSE PRACTITIONER

## 2024-11-21 PROCEDURE — 94664 DEMO&/EVAL PT USE INHALER: CPT

## 2024-11-21 PROCEDURE — 25000242 PHARM REV CODE 250 ALT 637 W/ HCPCS: Performed by: NURSE PRACTITIONER

## 2024-11-21 PROCEDURE — 94761 N-INVAS EAR/PLS OXIMETRY MLT: CPT

## 2024-11-21 PROCEDURE — 87502 INFLUENZA DNA AMP PROBE: CPT | Performed by: PHYSICIAN ASSISTANT

## 2024-11-21 PROCEDURE — 36415 COLL VENOUS BLD VENIPUNCTURE: CPT | Performed by: NURSE PRACTITIONER

## 2024-11-21 PROCEDURE — 80053 COMPREHEN METABOLIC PANEL: CPT | Performed by: NURSE PRACTITIONER

## 2024-11-21 PROCEDURE — 99900035 HC TECH TIME PER 15 MIN (STAT)

## 2024-11-21 PROCEDURE — 63600175 PHARM REV CODE 636 W HCPCS: Performed by: NURSE PRACTITIONER

## 2024-11-21 PROCEDURE — 27000646 HC AEROBIKA DEVICE

## 2024-11-21 PROCEDURE — 94640 AIRWAY INHALATION TREATMENT: CPT | Mod: XB

## 2024-11-21 PROCEDURE — 25000003 PHARM REV CODE 250: Performed by: NURSE PRACTITIONER

## 2024-11-21 RX ADMIN — FLUTICASONE FUROATE AND VILANTEROL TRIFENATATE 1 PUFF: 100; 25 POWDER RESPIRATORY (INHALATION) at 08:11

## 2024-11-21 RX ADMIN — ENOXAPARIN SODIUM 30 MG: 30 INJECTION SUBCUTANEOUS at 06:11

## 2024-11-21 RX ADMIN — SENNOSIDES AND DOCUSATE SODIUM 1 TABLET: 50; 8.6 TABLET ORAL at 09:11

## 2024-11-21 RX ADMIN — IPRATROPIUM BROMIDE AND ALBUTEROL SULFATE 3 ML: 2.5; .5 SOLUTION RESPIRATORY (INHALATION) at 08:11

## 2024-11-21 RX ADMIN — IPRATROPIUM BROMIDE AND ALBUTEROL SULFATE 3 ML: 2.5; .5 SOLUTION RESPIRATORY (INHALATION) at 03:11

## 2024-11-21 RX ADMIN — LISINOPRIL 20 MG: 20 TABLET ORAL at 09:11

## 2024-11-21 RX ADMIN — DONEPEZIL HYDROCHLORIDE 5 MG: 5 TABLET, FILM COATED ORAL at 09:11

## 2024-11-21 RX ADMIN — PREDNISONE 40 MG: 20 TABLET ORAL at 09:11

## 2024-11-21 RX ADMIN — IPRATROPIUM BROMIDE AND ALBUTEROL SULFATE 3 ML: 2.5; .5 SOLUTION RESPIRATORY (INHALATION) at 11:11

## 2024-11-21 RX ADMIN — AMLODIPINE BESYLATE 10 MG: 5 TABLET ORAL at 09:11

## 2024-11-21 NOTE — SUBJECTIVE & OBJECTIVE
Interval History: Seen at bedside resting in NAD, no complaints. Continue current management. Advance diet as tolerated    Review of Systems   Constitutional:  Negative for appetite change and chills.   Respiratory:  Positive for shortness of breath. Negative for cough and wheezing.    Cardiovascular:  Negative for chest pain and palpitations.   Gastrointestinal:  Negative for abdominal pain, nausea and vomiting.   Genitourinary:  Negative for difficulty urinating.   Musculoskeletal:  Negative for arthralgias.   Neurological:  Negative for dizziness and headaches.     Objective:     Vital Signs (Most Recent):  Temp: 98.3 °F (36.8 °C) (11/21/24 0740)  Pulse: 87 (11/21/24 1101)  Resp: 16 (11/21/24 0812)  BP: (!) 189/86 (11/21/24 0740)  SpO2: (!) 92 % (11/21/24 0812) Vital Signs (24h Range):  Temp:  [98 °F (36.7 °C)-98.6 °F (37 °C)] 98.3 °F (36.8 °C)  Pulse:  [] 87  Resp:  [12-28] 16  SpO2:  [92 %-100 %] 92 %  BP: (126-238)/() 189/86     Weight: 43.2 kg (95 lb 3.8 oz)  Body mass index is 16.35 kg/m².    Intake/Output Summary (Last 24 hours) at 11/21/2024 1124  Last data filed at 11/21/2024 0425  Gross per 24 hour   Intake 410 ml   Output 250 ml   Net 160 ml         Physical Exam  Vitals and nursing note reviewed.   Constitutional:       General: She is not in acute distress.     Appearance: Normal appearance. She is well-developed and normal weight. She is ill-appearing (chronically).      Comments: Thin, frail elderly female.    HENT:      Head: Normocephalic and atraumatic.      Mouth/Throat:      Dentition: Normal dentition.   Eyes:      General: Lids are normal.      Extraocular Movements: Extraocular movements intact.   Cardiovascular:      Rate and Rhythm: Normal rate and regular rhythm.   Pulmonary:      Effort: Pulmonary effort is normal. No respiratory distress.      Breath sounds: No wheezing.   Abdominal:      General: Bowel sounds are normal. There is no distension.      Palpations: Abdomen is  soft.   Musculoskeletal:      Cervical back: Neck supple.   Skin:     General: Skin is warm and dry.   Neurological:      Mental Status: She is alert.   Psychiatric:         Mood and Affect: Mood normal.             Significant Labs: All pertinent labs within the past 24 hours have been reviewed.    Significant Imaging: I have reviewed all pertinent imaging results/findings within the past 24 hours.

## 2024-11-21 NOTE — ASSESSMENT & PLAN NOTE
Essential HTN   -chronic   -BP elevated at admission   -continue home amlodipine  -continue home benazepril with pharmacy formulary alternative lisinopril   -PRN supportive care is indicated  -dose/medication adjustment as appropriate

## 2024-11-21 NOTE — ASSESSMENT & PLAN NOTE
Asthma-COPD overlap syndrome   Chronic hypoxic respiratory failure on home oxygen  Former smoker   -placed in observation D/T acute COPD/asthma exacerbation  -at admission hypertensive and tachypneic with stable saturations on nasal cannula   -ED workup detailed above  -s/p multiple nebulizers and IV steroids via EMS and during ED course  -COPD pathway initiated   -continue scheduled DuoNebs while awake   -prednisone PO  -no indication for antibiotic therapy at current   -dose/medication adjustment as appropriate   -continue oxygen supplementation   - resp viral panel pending  -incentive spirometer for pulmonary toileting   -ABG PRN concerns   -continue tele monitoring  -PRN supportive care as indicated  -trend labs, address/replete electrolytes as indicated

## 2024-11-21 NOTE — ASSESSMENT & PLAN NOTE
Asthma-COPD overlap syndrome   Chronic hypoxic respiratory failure on home oxygen  Former smoker   -placed in observation D/T acute COPD/asthma exacerbation  -at admission hypertensive and tachypneic with stable saturations on nasal cannula   -ED workup detailed above  -s/p multiple nebulizers and IV steroids via EMS and during ED course  -COPD pathway initiated   -continue scheduled DuoNebs while awake   -prednisone PO beginning tomorrow   -no indication for antibiotic therapy at current   -dose/medication adjustment as appropriate   -continue oxygen supplementation   -incentive spirometer for pulmonary toileting   -ABG PRN concerns   -continue tele monitoring  -PRN supportive care as indicated  -trend labs, address/replete electrolytes as indicated

## 2024-11-21 NOTE — HOSPITAL COURSE
Gloria Garcia was admitted for hypoxia at home.  She has been losing her inhalers and has a COPD exacerbation. After receiving breathing treatments and starting prednisone, she has been maintaining oxygen sats on admission. CXR unremarkable. VBG with normal pH. Discussed plan to dc her home discussed with her relative Meseret. Meseret mentioned that the pt loses her inhalers often and that is why she has been out. Pulmonology referral placed. Medications refilled. Ms Garcia will be discharged home with home health nurse followup.

## 2024-11-21 NOTE — H&P
[facility] Medicine  History & Physical    Patient Name: Gloria Garcia  MRN: [id number]  Patient Class: OP- Observation  Admission Date: 11/20/2024  Attending Physician: ZANA Ann MD   Primary Care Provider: Hailey, Primary Doctor    Patient information was obtained from patient, past medical records, and ER records.     Subjective:     Principal Problem:Acute exacerbation of COPD with asthma    Chief Complaint:   Chief Complaint   Patient presents with    Shortness of Breath     Pt brought in by [facility] with c/o SOB. Family called-- when fire arrived, pt oxygen was in the 80's. Pt placed on non rebreather and given duoneb with improvement. Pt 100% on 2L NC upon arrival. Hx of copd and dementia. Audible wheezing heard        HPI: Ms. 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     She presents to ED via EMS due to shortness of breath that started yesterday and was not improved with home oxygen or inhalers. She was noted to be hypoxic (low 80s) and drowsy on EMS arrival with saturations at which time she was placed on NRB and given nebulizer treatment with improvement in saturations to mid 90s. She denies wheezing, cough, CP, abdominal pain, N/V/D, constipation, urinary symptoms or hematuria, decreased appetite, changes in PO intake, light headiness, dizziness, or headache however patient has dementia and is oriented to person and place only. NO family present. She states that she lives with her sister and her daughter lives in [place]. She reports independence in ADLs and does not use ambulatory aides.     In the ED she is hypertensive, heart rate stable, respiratory rate upper 20s, saturation stable on 2 L nasal cannula, and afebrile.  CBC with WBC 6, H/H 11.2/36.3, platelets 323.  Chemistry was , K 5.4, chloride 109, CO2 23, BUN 18, SCr 1.4, glucose 112.  LFTs unremarkable.   Lipase 94.  .  Troponin 0.019.  UA noninfectious.  ABG with pH 7.429, pCO2 45.4, pO2 28, HC03 30, saturated O2 55, base excess 6.  CXR without acute cardiopulmonary process.  CT A/P with no acute abdominal or pelvic pathology CT of the abdomen and pelvis with contrast. Bilateral renal cortical scarring left greater than right.  Indeterminate area of low attenuation in the both kidneys.  Findings may be related to scarring rather than a discrete lesions.  If warranted, consider interval follow-up. Advanced vascular calcifications.    The patient was placed in observation to the Hospital Medicine Service for further evaluation and treatment.     Past Medical History:   Diagnosis Date    Asthma     COPD exacerbation 7/9/2024    Hypertension        Past Surgical History:   Procedure Laterality Date    HYSTERECTOMY         Review of patient's allergies indicates:  No Known Allergies    No current facility-administered medications on file prior to encounter.     Current Outpatient Medications on File Prior to Encounter   Medication Sig    acetaminophen (TYLENOL) 500 MG tablet Take 500 mg by mouth 2 (two) times daily as needed for Pain.    amlodipine-benazepril 10-20mg (LOTREL) 10-20 mg per capsule Take 1 capsule by mouth every morning.    ARICEPT 5 mg tablet Take 5 mg by mouth every evening.    atorvastatin (LIPITOR) 10 MG tablet Take 10 mg by mouth once daily. (Patient not taking: Reported on 7/11/2024)    chlorthalidone (HYGROTEN) 25 MG Tab Take 25 mg by mouth once daily. (Patient not taking: Reported on 7/11/2024)    cyanocobalamin, vitamin B-12, (VITAMIN B-12 ORAL) Take 1 tablet by mouth once daily. (Patient not taking: Reported on 7/11/2024)    lactulose 10 gram/15 ml (CHRONULAC) 10 gram/15 mL (15 mL) solution Take 10 g by mouth 2 (two) times daily as needed (constipation).    trolamine salicylate (ARTHRITIS TOP) Apply topically daily as needed (Pain).    WIXELA INHUB 250-50 mcg/dose diskus inhaler Inhale 1  puff into the lungs Daily.     Family History       Problem Relation (Age of Onset)    Hypertension Mother    Stroke Father          Tobacco Use    Smoking status: Former     Current packs/day: 0.00     Types: Cigarettes     Quit date: 3/28/1995     Years since quittin.6    Smokeless tobacco: Never   Substance and Sexual Activity    Alcohol use: Yes     Comment: occasional/social    Drug use: Never    Sexual activity: Not Currently     Review of Systems   Constitutional:  Negative for appetite change, chills and fever.   Respiratory:  Positive for shortness of breath. Negative for cough, chest tightness and wheezing.    Cardiovascular:  Negative for chest pain and palpitations.   Gastrointestinal:  Negative for abdominal pain, nausea and vomiting.   Genitourinary:  Negative for difficulty urinating.   Musculoskeletal:  Negative for arthralgias.   Neurological:  Negative for dizziness and headaches.       Objective:     Vital Signs (Most Recent):  Temp: 98.5 °F (36.9 °C) (24 1524)  Pulse: 85 (24)  Resp: 20 (24 1801)  BP: (!) 170/74 (24)  SpO2: 97 % (24) Vital Signs (24h Range):  Temp:  [98.5 °F (36.9 °C)] 98.5 °F (36.9 °C)  Pulse:  [] 85  Resp:  [20-28] 20  SpO2:  [95 %-100 %] 97 %  BP: (164-238)/() 170/74        There is no height or weight on file to calculate BMI.     Physical Exam  Vitals and nursing note reviewed.   Constitutional:       General: She is not in acute distress.     Appearance: Normal appearance. She is well-developed and normal weight. She is ill-appearing (chronically). She is not toxic-appearing.      Comments: Thin, frail elderly female.    HENT:      Head: Normocephalic and atraumatic.      Mouth/Throat:      Dentition: Normal dentition.   Eyes:      General: Lids are normal.      Extraocular Movements: Extraocular movements intact.      Conjunctiva/sclera: Conjunctivae normal.   Cardiovascular:      Rate and Rhythm: Normal rate and  regular rhythm.      Heart sounds: Normal heart sounds. No murmur heard.  Pulmonary:      Effort: Pulmonary effort is normal.      Comments: Coarse breath sounds bilateral bases, no wheezing. No conversational dyspnea or increased WOB.   Abdominal:      General: Bowel sounds are normal. There is no distension.      Palpations: Abdomen is soft.      Tenderness: There is no abdominal tenderness.   Musculoskeletal:         General: No deformity. Normal range of motion.      Cervical back: Neck supple.   Skin:     General: Skin is warm and dry.      Findings: No erythema or rash.   Neurological:      Mental Status: She is alert and oriented to person, place, and time.      Cranial Nerves: No cranial nerve deficit.   Psychiatric:         Thought Content: Thought content normal.         Judgment: Judgment normal.             Significant Labs: All pertinent labs within the past 24 hours have been reviewed.  CBC:   Recent Labs   Lab 11/20/24  1538 11/20/24  1548   WBC 5.74  --    HGB 11.2*  --    HCT 36.3* 35*     --      CMP:   Recent Labs   Lab 11/20/24  1538      K 5.4*      CO2 23   *   BUN 18   CREATININE 1.4   CALCIUM 9.3   PROT 7.4   ALBUMIN 3.9   BILITOT 0.3   ALKPHOS 46   AST 23   ALT 12   ANIONGAP 11     Cardiac Markers:   Recent Labs   Lab 11/20/24  1538   *     Troponin:   Recent Labs   Lab 11/20/24  1538   TROPONINI 0.019     Urine Studies:   Recent Labs   Lab 11/20/24  1836   COLORU Yellow   APPEARANCEUA Clear   PHUR 6.0   SPECGRAV >1.030*   PROTEINUA Trace*   GLUCUA Negative   KETONESU Negative   BILIRUBINUA Negative   OCCULTUA Negative   NITRITE Negative   UROBILINOGEN Negative   LEUKOCYTESUR Negative       Significant Imaging: I have reviewed all pertinent imaging results/findings within the past 24 hours.  Assessment/Plan:     * Acute exacerbation of COPD with asthma  Asthma-COPD overlap syndrome   Chronic hypoxic respiratory failure on home oxygen  Former smoker   -placed  in observation D/T acute COPD/asthma exacerbation  -at admission hypertensive and tachypneic with stable saturations on nasal cannula   -ED workup detailed above  -s/p multiple nebulizers and IV steroids via EMS and during ED course  -COPD pathway initiated   -continue scheduled DuoNebs while awake   -prednisone PO beginning tomorrow   -no indication for antibiotic therapy at current   -dose/medication adjustment as appropriate   -continue oxygen supplementation   -incentive spirometer for pulmonary toileting   -ABG PRN concerns   -continue tele monitoring  -PRN supportive care as indicated  -trend labs, address/replete electrolytes as indicated    Elevated lipase  -had complaints of abdominal pain at admission  -CT A/P detailed above however no acute findings  -lipase 94, no priors for comparison  -NPO status tonight  -further CMP and lipase trending in AM  -further imaging and consults as clinically indicated    Hypertensive urgency  Essential HTN   -chronic   -BP elevated at admission   -continue home amlodipine  -continue home benazepril with pharmacy formulary alternative lisinopril   -PRN supportive care is indicated  -dose/medication adjustment as appropriate       VTE Risk Mitigation (From admission, onward)           Ordered     enoxaparin injection 30 mg  Daily         11/20/24 1933     IP VTE HIGH RISK PATIENT  Once         11/20/24 1933     Place sequential compression device  Until discontinued         11/20/24 1933                  On 11/20/2024, patient placed in hospital observation services.      Celsa Callahan, DNP, AG-ACNP, BC  Department of Hospital Medicine  [facility]

## 2024-11-21 NOTE — PLAN OF CARE
11/21/24 1114   Readmission   Was this a planned readmission? No   Why were you readmitted? Related to previous admission   When you left the hospital where did you go? Home with Family   Did you try to manage your symptoms your self? No   Did you call anyone? No   Did you try to see or did see a doctor or nurse before you came? No   Did you have  a follow-up appointment on discharge? Yes   Did you go? No

## 2024-11-21 NOTE — ASSESSMENT & PLAN NOTE
-had complaints of abdominal pain at admission  -CT A/P detailed above however no acute findings  -lipase 94, no priors for comparison  -further CMP and lipase trending in AM: 55  - stable, advance diet as tolerated

## 2024-11-21 NOTE — NURSING
POC reviewed. No significant events this shift. Cardiac monitor maintained.  Patient on room air, tolerating well. No complaints of pain. Pt voids per external catheter and shifts in bed independently. Bed low and locked, side rails up x3, call light within reach.

## 2024-11-21 NOTE — PLAN OF CARE
MOON Message    Medicare Outpatient and Observation Notification regarding financial responsibilityExplained to patient/caregiver; Other (comments)Medicare Outpatient and Observation Notification regarding financial responsibility. Explained to patient/caregiver; Other (comments). The comment is Verbal Consent. Taken on 11/21/24 1437Date REBOLLEDO was [id number]/21/2024Time REBOLLEDO was [id number]

## 2024-11-21 NOTE — PROGRESS NOTES
Pharmacist Renal Dose Adjustment Note    Gloria Garcia is a 89 y.o. female being treated with the medication Lovenox    Patient Data:    Vital Signs (Most Recent):  Temp: 98.5 °F (36.9 °C) (11/20/24 1524)  Pulse: 85 (11/20/24 1901)  Resp: 20 (11/20/24 1801)  BP: (!) 182/124 (11/20/24 1901)  SpO2: 95 % (11/20/24 1902) Vital Signs (72h Range):  Temp:  [98.5 °F (36.9 °C)]   Pulse:  []   Resp:  [20-28]   BP: (164-238)/()   SpO2:  [95 %-100 %]      Recent Labs   Lab 11/20/24  1538   CREATININE 1.4     Serum creatinine: 1.4 mg/dL 11/20/24 1538  Estimated creatinine clearance: 25.7 mL/min    Medication:Lovenox dose: 40 mg frequency q24h will be changed to medication:Lovenox dose:30 mg frequency:q24h    Pharmacist's Name: Lupe Arguelles  Pharmacist's Extension: [phone]

## 2024-11-21 NOTE — PLAN OF CARE
Sw spoke with patient and reviewed past records. Patient lives with her niece who is her caretaker. Patient does not receive HH. Patient has DME. Patient has a nebulizer. Patient choice pharmacy is bedside. Patient does not have transportation as her niece does not get off of work till after 5 PM.     [facility] Med Surg ([facility])  Initial Discharge Assessment       Primary Care Provider: Hailey, Primary Doctor    Admission Diagnosis: SOB (shortness of breath) [R06.02]    Admission Date: 11/20/2024  Expected Discharge Date: 11/22/2024    Transition of Care Barriers: (P) Transportation    Payor: HU[employer] MANAGED MEDICARE / Plan: [employer] MEDICARE HMO / Product Type: Capitation /     Extended Emergency Contact Information  Primary Emergency Contact: FerminProMeseret  Mobile Phone: [phone]  Relation: Relative  Secondary Emergency Contact: FerminLynn jane  Mobile Phone: [phone]  Relation: Relative    Discharge Plan A: (P) Home with family, Home  Discharge Plan B: (P) Home Health      [id number] - [facility] [address]  [address]  [facility] [address]  Phone: [phone] Fax: [phone]      Initial Assessment (most recent)       Adult Discharge Assessment - 11/21/24 0905          Discharge Assessment    Assessment Type Discharge Planning Assessment     Confirmed/corrected address, phone number and insurance Yes     Confirmed Demographics Correct on Facesheet     Source of Information patient;family;health record     People in Home other relative(s)     Do you expect to return to your current living situation? Yes     Do you have help at home or someone to help you manage your care at home? Yes     Prior to hospitilization cognitive status: Unable to Assess     Current cognitive status: Unable to Assess     Equipment Currently Used at Home nebulizer;oxygen (P)      Readmission within 30 days? Yes (P)      Patient currently being  followed by outpatient case management? No (P)      Do you currently have service(s) that help you manage your care at home? No (P)      Do you take prescription medications? Yes (P)      Do you have prescription coverage? Yes (P)      Do you have any problems affording any of your prescribed medications? No (P)      Is the patient taking medications as prescribed? yes (P)      How do you get to doctors appointments? family or friend will provide (P)      Are you on dialysis? No (P)      Do you take coumadin? No (P)      Discharge Plan A Home with family;Home (P)      Discharge Plan B Home Health (P)      Discharge Plan discussed with: Patient (P)      Transition of Care Barriers Transportation (P)

## 2024-11-21 NOTE — PLAN OF CARE
Patient had an uneventful shift, remained on bedrest and encouraged to shift her weight. Patient was educated about need for sputum collection, but has not been able to produce anything. Patient is tolerating clear liquids well. No complaints of pain or shortness of breath   Problem: Adult Inpatient Plan of Care  Goal: Plan of Care Review  Outcome: Progressing  Goal: Patient-Specific Goal (Individualized)  Outcome: Progressing  Goal: Absence of Hospital-Acquired Illness or Injury  Outcome: Progressing  Goal: Optimal Comfort and Wellbeing  Outcome: Progressing  Goal: Readiness for Transition of Care  Outcome: Progressing     Problem: COPD (Chronic Obstructive Pulmonary Disease)  Goal: Optimal Chronic Illness Coping  Outcome: Progressing  Goal: Optimal Level of Functional [place]  Outcome: Progressing  Goal: Absence of Infection Signs and Symptoms  Outcome: Progressing  Goal: Improved Oral Intake  Outcome: Progressing  Goal: Effective Oxygenation and Ventilation  Outcome: Progressing     Problem: Fall Injury Risk  Goal: Absence of Fall and Fall-Related Injury  Outcome: Progressing     Problem: Skin Injury Risk Increased  Goal: Skin Health and Integrity  Outcome: Progressing

## 2024-11-21 NOTE — HPI
Ms. 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     She presents to ED via EMS due to shortness of breath that started yesterday and was not improved with home oxygen or inhalers. She was noted to be hypoxic (low 80s) and drowsy on EMS arrival with saturations at which time she was placed on NRB and given nebulizer treatment with improvement in saturations to mid 90s. She denies wheezing, cough, CP, abdominal pain, N/V/D, constipation, urinary symptoms or hematuria, decreased appetite, changes in PO intake, light headiness, dizziness, or headache however patient has dementia and is oriented to person and place only. NO family present. She states that she lives with her sister and her daughter lives in [place]. She reports independence in ADLs and does not use ambulatory aides.     In the ED she is hypertensive, heart rate stable, respiratory rate upper 20s, saturation stable on 2 L nasal cannula, and afebrile.  CBC with WBC 6, H/H 11.2/36.3, platelets 323.  Chemistry was , K 5.4, chloride 109, CO2 23, BUN 18, SCr 1.4, glucose 112.  LFTs unremarkable.  Lipase 94.  .  Troponin 0.019.  UA noninfectious.  ABG with pH 7.429, pCO2 45.4, pO2 28, HC03 30, saturated O2 55, base excess 6.  CXR without acute cardiopulmonary process.  CT A/P with no acute abdominal or pelvic pathology CT of the abdomen and pelvis with contrast. Bilateral renal cortical scarring left greater than right.  Indeterminate area of low attenuation in the both kidneys.  Findings may be related to scarring rather than a discrete lesions.  If warranted, consider interval follow-up. Advanced vascular calcifications.    The patient was placed in observation to the Hospital Medicine Service for further evaluation and treatment.

## 2024-11-21 NOTE — SUBJECTIVE & OBJECTIVE
Past Medical History:   Diagnosis Date    Asthma     COPD exacerbation 2024    Hypertension        Past Surgical History:   Procedure Laterality Date    HYSTERECTOMY         Review of patient's allergies indicates:  No Known Allergies    No current facility-administered medications on file prior to encounter.     Current Outpatient Medications on File Prior to Encounter   Medication Sig    acetaminophen (TYLENOL) 500 MG tablet Take 500 mg by mouth 2 (two) times daily as needed for Pain.    amlodipine-benazepril 10-20mg (LOTREL) 10-20 mg per capsule Take 1 capsule by mouth every morning.    ARICEPT 5 mg tablet Take 5 mg by mouth every evening.    atorvastatin (LIPITOR) 10 MG tablet Take 10 mg by mouth once daily. (Patient not taking: Reported on 2024)    chlorthalidone (HYGROTEN) 25 MG Tab Take 25 mg by mouth once daily. (Patient not taking: Reported on 2024)    cyanocobalamin, vitamin B-12, (VITAMIN B-12 ORAL) Take 1 tablet by mouth once daily. (Patient not taking: Reported on 2024)    lactulose 10 gram/15 ml (CHRONULAC) 10 gram/15 mL (15 mL) solution Take 10 g by mouth 2 (two) times daily as needed (constipation).    trolamine salicylate (ARTHRITIS TOP) Apply topically daily as needed (Pain).    WIXELA INHUB 250-50 mcg/dose diskus inhaler Inhale 1 puff into the lungs Daily.     Family History       Problem Relation (Age of Onset)    Hypertension Mother    Stroke Father          Tobacco Use    Smoking status: Former     Current packs/day: 0.00     Types: Cigarettes     Quit date: 3/28/1995     Years since quittin.6    Smokeless tobacco: Never   Substance and Sexual Activity    Alcohol use: Yes     Comment: occasional/social    Drug use: Never    Sexual activity: Not Currently     Review of Systems   Constitutional:  Negative for appetite change, chills and fever.   Respiratory:  Positive for shortness of breath. Negative for cough, chest tightness and wheezing.    Cardiovascular:  Negative  for chest pain and palpitations.   Gastrointestinal:  Negative for abdominal pain, nausea and vomiting.   Genitourinary:  Negative for difficulty urinating.   Musculoskeletal:  Negative for arthralgias.   Neurological:  Negative for dizziness and headaches.       Objective:     Vital Signs (Most Recent):  Temp: 98.5 °F (36.9 °C) (11/20/24 1524)  Pulse: 85 (11/20/24 1932)  Resp: 20 (11/20/24 1801)  BP: (!) 170/74 (11/20/24 1932)  SpO2: 97 % (11/20/24 1932) Vital Signs (24h Range):  Temp:  [98.5 °F (36.9 °C)] 98.5 °F (36.9 °C)  Pulse:  [] 85  Resp:  [20-28] 20  SpO2:  [95 %-100 %] 97 %  BP: (164-238)/() 170/74        There is no height or weight on file to calculate BMI.     Physical Exam  Vitals and nursing note reviewed.   Constitutional:       General: She is not in acute distress.     Appearance: Normal appearance. She is well-developed and normal weight. She is ill-appearing (chronically). She is not toxic-appearing.      Comments: Thin, frail elderly female.    HENT:      Head: Normocephalic and atraumatic.      Mouth/Throat:      Dentition: Normal dentition.   Eyes:      General: Lids are normal.      Extraocular Movements: Extraocular movements intact.      Conjunctiva/sclera: Conjunctivae normal.   Cardiovascular:      Rate and Rhythm: Normal rate and regular rhythm.      Heart sounds: Normal heart sounds. No murmur heard.  Pulmonary:      Effort: Pulmonary effort is normal.      Comments: Coarse breath sounds bilateral bases, no wheezing. No conversational dyspnea or increased WOB.   Abdominal:      General: Bowel sounds are normal. There is no distension.      Palpations: Abdomen is soft.      Tenderness: There is no abdominal tenderness.   Musculoskeletal:         General: No deformity. Normal range of motion.      Cervical back: Neck supple.   Skin:     General: Skin is warm and dry.      Findings: No erythema or rash.   Neurological:      Mental Status: She is alert and oriented to person,  place, and time.      Cranial Nerves: No cranial nerve deficit.   Psychiatric:         Thought Content: Thought content normal.         Judgment: Judgment normal.             Significant Labs: All pertinent labs within the past 24 hours have been reviewed.  CBC:   Recent Labs   Lab 11/20/24  1538 11/20/24  1548   WBC 5.74  --    HGB 11.2*  --    HCT 36.3* 35*     --      CMP:   Recent Labs   Lab 11/20/24  1538      K 5.4*      CO2 23   *   BUN 18   CREATININE 1.4   CALCIUM 9.3   PROT 7.4   ALBUMIN 3.9   BILITOT 0.3   ALKPHOS 46   AST 23   ALT 12   ANIONGAP 11     Cardiac Markers:   Recent Labs   Lab 11/20/24  1538   *     Troponin:   Recent Labs   Lab 11/20/24  1538   TROPONINI 0.019     Urine Studies:   Recent Labs   Lab 11/20/24  1836   COLORU Yellow   APPEARANCEUA Clear   PHUR 6.0   SPECGRAV >1.030*   PROTEINUA Trace*   GLUCUA Negative   KETONESU Negative   BILIRUBINUA Negative   OCCULTUA Negative   NITRITE Negative   UROBILINOGEN Negative   LEUKOCYTESUR Negative       Significant Imaging: I have reviewed all pertinent imaging results/findings within the past 24 hours.

## 2024-11-21 NOTE — ASSESSMENT & PLAN NOTE
-had complaints of abdominal pain at admission  -CT A/P detailed above however no acute findings  -lipase 94, no priors for comparison  -NPO status tonight  -further CMP and lipase trending in AM  -further imaging and consults as clinically indicated

## 2024-11-21 NOTE — PLAN OF CARE
Problem: Adult Inpatient Plan of Care  Goal: Plan of Care Review  Outcome: Progressing  Goal: Patient-Specific Goal (Individualized)  Outcome: Progressing  Goal: Absence of Hospital-Acquired Illness or Injury  Outcome: Progressing  Goal: Optimal Comfort and Wellbeing  Outcome: Progressing  Goal: Readiness for Transition of Care  Outcome: Progressing     Problem: COPD (Chronic Obstructive Pulmonary Disease)  Goal: Optimal Chronic Illness Coping  Outcome: Progressing  Goal: Optimal Level of Functional [place]  Outcome: Progressing  Goal: Absence of Infection Signs and Symptoms  Outcome: Progressing  Goal: Improved Oral Intake  Outcome: Progressing  Goal: Effective Oxygenation and Ventilation  Outcome: Progressing     Problem: Fall Injury Risk  Goal: Absence of Fall and Fall-Related Injury  Outcome: Progressing     Problem: Skin Injury Risk Increased  Goal: Skin Health and Integrity  Outcome: Progressing

## 2024-11-21 NOTE — PROGRESS NOTES
[facility] Medicine  Progress Note    Patient Name: Gloria Garcia  MRN: [id number]  Patient Class: OP- Observation   Admission Date: 11/20/2024  Length of Stay: 0 days  Attending Physician: ZANA Ann MD  Primary Care Provider: Hailey, Primary Doctor        Subjective:     Principal Problem:Acute exacerbation of COPD with asthma        HPI:  Ms. 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     She presents to ED via EMS due to shortness of breath that started yesterday and was not improved with home oxygen or inhalers. She was noted to be hypoxic (low 80s) and drowsy on EMS arrival with saturations at which time she was placed on NRB and given nebulizer treatment with improvement in saturations to mid 90s. She denies wheezing, cough, CP, abdominal pain, N/V/D, constipation, urinary symptoms or hematuria, decreased appetite, changes in PO intake, light headiness, dizziness, or headache however patient has dementia and is oriented to person and place only. NO family present. She states that she lives with her sister and her daughter lives in [place]. She reports independence in ADLs and does not use ambulatory aides.     In the ED she is hypertensive, heart rate stable, respiratory rate upper 20s, saturation stable on 2 L nasal cannula, and afebrile.  CBC with WBC 6, H/H 11.2/36.3, platelets 323.  Chemistry was , K 5.4, chloride 109, CO2 23, BUN 18, SCr 1.4, glucose 112.  LFTs unremarkable.  Lipase 94.  .  Troponin 0.019.  UA noninfectious.  ABG with pH 7.429, pCO2 45.4, pO2 28, HC03 30, saturated O2 55, base excess 6.  CXR without acute cardiopulmonary process.  CT A/P with no acute abdominal or pelvic pathology CT of the abdomen and pelvis with contrast. Bilateral renal cortical scarring left greater than right.  Indeterminate area of low attenuation in the both kidneys.   Findings may be related to scarring rather than a discrete lesions.  If warranted, consider interval follow-up. Advanced vascular calcifications.    The patient was placed in observation to the Hospital Medicine Service for further evaluation and treatment.     Overview/Hospital Course:  Gloria Garcia was admitted for hypoxia at home, COPD exacerbation. Maintaining oxygen sats on admission. CXR unremarkable. VBG with normal pH. Started on prednisone, breathing treatments. Resp viral panel pending    [place]ulm referral at [place]    Interval History: Seen at bedside resting in NAD, no complaints. Continue current management. Advance diet as tolerated    Review of Systems   Constitutional:  Negative for appetite change and chills.   Respiratory:  Positive for shortness of breath. Negative for cough and wheezing.    Cardiovascular:  Negative for chest pain and palpitations.   Gastrointestinal:  Negative for abdominal pain, nausea and vomiting.   Genitourinary:  Negative for difficulty urinating.   Musculoskeletal:  Negative for arthralgias.   Neurological:  Negative for dizziness and headaches.     Objective:     Vital Signs (Most Recent):  Temp: 98.3 °F (36.8 °C) (11/21/24 0740)  Pulse: 87 (11/21/24 1101)  Resp: 16 (11/21/24 0812)  BP: (!) 189/86 (11/21/24 0740)  SpO2: (!) 92 % (11/21/24 0812) Vital Signs (24h Range):  Temp:  [98 °F (36.7 °C)-98.6 °F (37 °C)] 98.3 °F (36.8 °C)  Pulse:  [] 87  Resp:  [12-28] 16  SpO2:  [92 %-100 %] 92 %  BP: (126-238)/() 189/86     Weight: 43.2 kg (95 lb 3.8 oz)  Body mass index is 16.35 kg/m².    Intake/Output Summary (Last 24 hours) at 11/21/2024 1124  Last data filed at 11/21/2024 0425  Gross per 24 hour   Intake 410 ml   Output 250 ml   Net 160 ml         Physical Exam  Vitals and nursing note reviewed.   Constitutional:       General: She is not in acute distress.     Appearance: Normal appearance. She is well-developed and normal weight. She is ill-appearing (chronically).       Comments: Thin, frail elderly female.    HENT:      Head: Normocephalic and atraumatic.      Mouth/Throat:      Dentition: Normal dentition.   Eyes:      General: Lids are normal.      Extraocular Movements: Extraocular movements intact.   Cardiovascular:      Rate and Rhythm: Normal rate and regular rhythm.   Pulmonary:      Effort: Pulmonary effort is normal. No respiratory distress.      Breath sounds: No wheezing.   Abdominal:      General: Bowel sounds are normal. There is no distension.      Palpations: Abdomen is soft.   Musculoskeletal:      Cervical back: Neck supple.   Skin:     General: Skin is warm and dry.   Neurological:      Mental Status: She is alert.   Psychiatric:         Mood and Affect: Mood normal.             Significant Labs: All pertinent labs within the past 24 hours have been reviewed.    Significant Imaging: I have reviewed all pertinent imaging results/findings within the past 24 hours.    Assessment/Plan:      * Acute exacerbation of COPD with asthma  Asthma-COPD overlap syndrome   Chronic hypoxic respiratory failure on home oxygen  Former smoker   -placed in observation D/T acute COPD/asthma exacerbation  -at admission hypertensive and tachypneic with stable saturations on nasal cannula   -ED workup detailed above  -s/p multiple nebulizers and IV steroids via EMS and during ED course  -COPD pathway initiated   -continue scheduled DuoNebs while awake   -prednisone PO  -no indication for antibiotic therapy at current   -dose/medication adjustment as appropriate   -continue oxygen supplementation   - resp viral panel pending  -incentive spirometer for pulmonary toileting   -ABG PRN concerns   -continue tele monitoring  -PRN supportive care as indicated  -trend labs, address/replete electrolytes as indicated    Elevated lipase  -had complaints of abdominal pain at admission  -CT A/P detailed above however no acute findings  -lipase 94, no priors for comparison  -further CMP and lipase  trending in AM: 55  - stable, advance diet as tolerated    Hypertensive urgency  Essential HTN   -chronic   -BP elevated at admission   -continue home amlodipine  -continue home benazepril with pharmacy formulary alternative lisinopril   -PRN supportive care is indicated  -dose/medication adjustment as appropriate       VTE Risk Mitigation (From admission, onward)           Ordered     enoxaparin injection 30 mg  Daily         11/20/24 1933     IP VTE HIGH RISK PATIENT  Once         11/20/24 1933     Place sequential compression device  Until discontinued         11/20/24 1933                    Discharge Planning   DOTTIE: 11/22/2024     Code Status: Full Code   Is the patient medically ready for discharge?:     Reason for patient still in hospital (select all that apply): Patient trending condition  Discharge Plan A: Home with family, Home                  Sujey Stokes PA-C  Department of Hospital Medicine   [facility] - Med Surg ([place])

## 2024-11-22 VITALS
DIASTOLIC BLOOD PRESSURE: 74 MMHG | SYSTOLIC BLOOD PRESSURE: 158 MMHG | WEIGHT: 95.25 LBS | RESPIRATION RATE: 16 BRPM | BODY MASS INDEX: 16.26 KG/M2 | OXYGEN SATURATION: 89 % | HEIGHT: 64 IN | TEMPERATURE: 98 F | HEART RATE: 80 BPM

## 2024-11-22 PROCEDURE — 94799 UNLISTED PULMONARY SVC/PX: CPT | Mod: XB

## 2024-11-22 PROCEDURE — 94640 AIRWAY INHALATION TREATMENT: CPT

## 2024-11-22 PROCEDURE — 25000242 PHARM REV CODE 250 ALT 637 W/ HCPCS

## 2024-11-22 PROCEDURE — 94761 N-INVAS EAR/PLS OXIMETRY MLT: CPT

## 2024-11-22 PROCEDURE — 63600175 PHARM REV CODE 636 W HCPCS: Performed by: NURSE PRACTITIONER

## 2024-11-22 PROCEDURE — 25000003 PHARM REV CODE 250: Performed by: NURSE PRACTITIONER

## 2024-11-22 RX ORDER — PREDNISONE 20 MG/1
40 TABLET ORAL DAILY
Qty: 4 TABLET | Refills: 0 | Status: SHIPPED | OUTPATIENT
Start: 2024-11-23 | End: 2024-11-25

## 2024-11-22 RX ORDER — IPRATROPIUM BROMIDE AND ALBUTEROL SULFATE 2.5; .5 MG/3ML; MG/3ML
3 SOLUTION RESPIRATORY (INHALATION) EVERY 4 HOURS PRN
Qty: 90 ML | Refills: 2 | Status: SHIPPED | OUTPATIENT
Start: 2024-11-22 | End: 2025-11-22

## 2024-11-22 RX ADMIN — FLUTICASONE FUROATE AND VILANTEROL TRIFENATATE 1 PUFF: 100; 25 POWDER RESPIRATORY (INHALATION) at 07:11

## 2024-11-22 RX ADMIN — LISINOPRIL 20 MG: 20 TABLET ORAL at 08:11

## 2024-11-22 RX ADMIN — IPRATROPIUM BROMIDE AND ALBUTEROL SULFATE 3 ML: 2.5; .5 SOLUTION RESPIRATORY (INHALATION) at 07:11

## 2024-11-22 RX ADMIN — IPRATROPIUM BROMIDE AND ALBUTEROL SULFATE 3 ML: 2.5; .5 SOLUTION RESPIRATORY (INHALATION) at 12:11

## 2024-11-22 RX ADMIN — AMLODIPINE BESYLATE 10 MG: 5 TABLET ORAL at 08:11

## 2024-11-22 RX ADMIN — PREDNISONE 40 MG: 20 TABLET ORAL at 08:11

## 2024-11-22 RX ADMIN — SENNOSIDES AND DOCUSATE SODIUM 1 TABLET: 50; 8.6 TABLET ORAL at 08:11

## 2024-11-22 NOTE — ASSESSMENT & PLAN NOTE
-had complaints of abdominal pain at admission  -CT A/P detailed above however no acute findings  -lipase 94, no priors for comparison  -further CMP and lipase trending in AM: 55  - stable, advance diet as tolerated  - tolerating regular diet, normal BM 11/22

## 2024-11-22 NOTE — PLAN OF CARE
[facility]  Discharge Final Note    Primary Care Provider: Hailey Primary Doctor    Expected Discharge Date: 11/22/2024    Final Discharge Note (most recent)       Final Note - 11/22/24 1159          Final Note    Assessment Type Final Discharge Note (P)      Anticipated Discharge Disposition Home-Health Care Svc (P)      Hospital Resources/Appts/Education Provided Provided patient/caregiver with written discharge plan information;Appointments scheduled and added to AVS (P)         Post-Acute Status    Post-Acute Authorization Home Health (P)      Home Health Status Set-up Complete/Auth obtained (P)      Discharge Delays None known at this time (P)                      Important Message from Medicare  Important Message from Medicare regarding Discharge Appeal Rights: Explained to patient/caregiver, Other (comments) (Verbal Consent)     Date IMM was signed: 11/22/24  Time IMM was signed: 0919    Contact Info       [facility] Dept   Specialty: Emergency Medicine    [address] [facility] [address]   Phone: [phone]       Next Steps: Go to    Instructions: As needed, If symptoms worsen    [facility] [place]   Specialty: Home Health Services    [address] [facility] [address]   Phone: [phone]       Next Steps: Follow up on 11/23/2024    Instructions: They will contact you for home healthcare appointments.                Discharge Disposition: Home with HH ([facility])    New DME ordered / company name: NA    Relevant SDOH / Transition of Care Barriers:  Transportation     Primary Caretaker and contact information: Meseret Phillip     Scheduled followup appointment: Patient has a PCP appointment next week with [facility]     Referrals placed: NA    Transportation: Donny scheduled a wheelchair van for 1:30 PM     Patient and family educated on discharge services and updated on DC plan. Bedside RN notified, patient clear to discharge from  Case Management Perspective.

## 2024-11-22 NOTE — PLAN OF CARE
[facility] - Med Surg ([place])      HOME HEALTH ORDERS  FACE TO FACE ENCOUNTER    Patient Name: Gloria Garcia  YOB: 1935    PCP: Hailey, Primary Doctor   PCP Address: None  PCP Phone Number: None  PCP Fax: None    Encounter Date: 11/20/24    Admit to Home Health    Diagnoses:  Active Hospital Problems    Diagnosis  POA    *Acute exacerbation of COPD with asthma [J44.1]  Yes    Asthma-COPD overlap syndrome [J44.89]  Yes    Former smoker [Z87.891]  Not Applicable    Elevated lipase [R74.8]  Yes    Chronic hypoxic respiratory failure, on home oxygen therapy [J96.11, Z99.81]  Not Applicable    Hypertensive urgency [I16.0]  Yes    Essential hypertension [I10]  Yes     Chronic      Resolved Hospital Problems   No resolved problems to display.       Follow Up Appointments:  No future appointments.    Allergies:Review of patient's allergies indicates:  No Known Allergies    Medications: Review discharge medications with patient and family and provide education.    Current Facility-Administered Medications   Medication Dose Route Frequency Provider Last Rate Last Admin    acetaminophen tablet 650 mg  650 mg Oral Q4H PRN Celsa Callahan, VIOLET        albuterol-ipratropium 2.5 mg-0.5 mg/3 mL nebulizer solution 3 mL  3 mL Nebulization Q4H WAKE Celsa Callahan, NP   3 mL at 11/22/24 0750    amLODIPine tablet 10 mg  10 mg Oral Daily Celsa Callahan, NP   10 mg at 11/22/24 0831    dextromethorphan-guaiFENesin  mg/5 ml liquid 10 mL  10 mL Oral Q4H PRN Celsa Callahan, NP        donepeziL tablet 5 mg  5 mg Oral QHS Celsa Callahan, NP   5 mg at 11/21/24 2124    enoxaparin injection 30 mg  30 mg Subcutaneous Daily Celsa Callahan, NP   30 mg at 11/21/24 1810    fluticasone furoate-vilanteroL 100-25 mcg/dose diskus inhaler 1 puff  1 puff Inhalation Daily Celsa Callahan, NP   1 puff at 11/22/24 0750    hydrALAZINE tablet 25 mg  25 mg Oral Q8H PRN Celsa Callahan, NP        lisinopriL  tablet 20 mg  20 mg Oral Daily Celsa Callahan NP   20 mg at 11/22/24 0831    melatonin tablet 6 mg  6 mg Oral Nightly PRN Celsa Callahan NP   6 mg at 11/20/24 2129    ondansetron disintegrating tablet 8 mg  8 mg Oral Q8H PRN Celsa Callahan, NP        polyethylene glycol packet 17 g  17 g Oral BID PRN Celsa Callahan NP        predniSONE tablet 40 mg  40 mg Oral Daily Celsa Callahan, NP   40 mg at 11/22/24 0831    senna-docusate 8.6-50 mg per tablet 1 tablet  1 tablet Oral BID Celsa Callahan NP   1 tablet at 11/22/24 0831    sodium chloride 0.9% flush 3 mL  3 mL Intravenous PRN Celsa Callahan NP            Medication List        ASK your doctor about these medications      acetaminophen 500 MG tablet  Commonly known as: TYLENOL  Take 500 mg by mouth 2 (two) times daily as needed for Pain.     amlodipine-benazepril 10-20mg 10-20 mg per capsule  Commonly known as: LOTREL  Take 1 capsule by mouth every morning.     ARICEPT 5 mg tablet  Generic drug: donepeziL  Take 5 mg by mouth every evening.     ARTHRITIS TOP  Apply topically daily as needed (Pain).     atorvastatin 10 MG tablet  Commonly known as: LIPITOR  Take 10 mg by mouth once daily.     chlorthalidone 25 MG Tab  Commonly known as: HYGROTEN  Take 25 mg by mouth once daily.     lactulose 10 gram/15 ml 10 gram/15 mL (15 mL) solution  Commonly known as: CHRONULAC  Take 10 g by mouth 2 (two) times daily as needed (constipation).     VITAMIN B-12 ORAL  Take 1 tablet by mouth once daily.     WIXELA INHUB 250-50 mcg/dose diskus inhaler  Generic drug: fluticasone-salmeterol 250-50 mcg/dose  Inhale 1 puff into the lungs Daily.                I have seen and examined this patient within the last 30 days. My clinical findings that support the need for the home health skilled services and home bound status are the following:no   Weakness/numbness causing balance and gait disturbance due to Weakness/Debility making it taxing to leave  home.  Mental confusion making it unsafe for patient to leave home alone due to  Dementia.     Diet:   regular diet    Labs:  none    Referrals/ Consults  none    Activities:   activity as tolerated    Nursing:   Agency to admit patient within 24 hours of hospital discharge unless specified on physician order or at patient request    SN to complete comprehensive assessment including routine vital signs. Instruct on disease process and s/s of complications to report to MD. Review/verify medication list sent home with the patient at time of discharge  and instruct patient/caregiver as needed. Frequency may be adjusted depending on start of care date.     Skilled nurse to perform up to 3 visits PRN for symptoms related to diagnosis    Notify MD if SBP > 160 or < 90; DBP > 90 or < 50; HR > 120 or < 50; Temp > 101; O2 < 88%    Ok to schedule additional visits based on staff availability and patient request on consecutive days within the home health episode.    When multiple disciplines ordered:    Start of Care occurs on Sunday - Wednesday schedule remaining discipline evaluations as ordered on separate consecutive days following the start of care.    Thursday SOC -schedule subsequent evaluations Friday and Monday the following week.     Friday - Saturday SOC - schedule subsequent discipline evaluations on consecutive days starting Monday of the following week.    For all post-discharge communication and subsequent orders please contact patient's primary care physician. If unable to reach primary care physician or do not receive response within 30 minutes, please contact [facility] for clinical staff order clarification    Miscellaneous   Routine Skin for Bedridden Patients: Instruct patient/caregiver to apply moisture barrier cream to all skin folds and wet areas in perineal area daily and after baths and all bowel movements.    Home Health Aide:  Nursing Twice weekly    Wound Care Orders  no    I certify that this patient  is confined to her home and needs intermittent skilled nursing care.

## 2024-11-22 NOTE — PLAN OF CARE
AAO x person and place.  She is disoriented to time/situation but easily reoriented.  Pleasant disposition.  Plan of care reviewed but patient without much interest.  Bed alarm and fall bracelet in use.  No falls/injuries noted during the shift.  Up to the toilet to attempt bowel movement twice during the shift, otherwise using a purewick set up for urine.  Skin is warm/dry.  Some redness/maceration noted to the sacral area.  Site cleansed twice during the night per PCT and zinc oxide barrier cream applied. No shortness of breath observed.  Patient on room air with O2 sats of 94-95%.  Juice provided PO per patient request.  Call light within reach.  Bed alarm in use.  Door left ajar for frequent rounding per protocol.    Problem: Adult Inpatient Plan of Care  Goal: Plan of Care Review  Outcome: Progressing  Flowsheets (Taken 11/22/2024 0228)  Plan of Care Reviewed With: patient     Problem: Adult Inpatient Plan of Care  Goal: Absence of Hospital-Acquired Illness or Injury  Intervention: Identify and Manage Fall Risk  Flowsheets (Taken 11/22/2024 0228)  Safety Promotion/Fall Prevention:   assistive device/personal item within reach   bed alarm set   commode/urinal/bedpan at bedside   Fall Risk reviewed with patient/family   high risk medications identified   instructed to call staff for mobility   lighting adjusted   medications reviewed   nonskid shoes/socks when out of bed   side rails raised x 2     Problem: Adult Inpatient Plan of Care  Goal: Absence of Hospital-Acquired Illness or Injury  Intervention: Prevent Infection  Flowsheets (Taken 11/22/2024 0228)  Infection Prevention:   hand hygiene promoted   single patient room provided     Problem: COPD (Chronic Obstructive Pulmonary Disease)  Goal: Optimal Chronic Illness Coping  Outcome: Progressing  Intervention: Support and Optimize Psychosocial Response  Flowsheets (Taken 11/22/2024 0228)  Supportive Measures:   active listening utilized   decision-making  supported   positive reinforcement provided   self-care encouraged   self-reflection promoted   self-responsibility promoted   verbalization of feelings encouraged  Family/Support System Care:   self-care encouraged   support provided     Problem: COPD (Chronic Obstructive Pulmonary Disease)  Goal: Effective Oxygenation and Ventilation  Outcome: Progressing  Intervention: Promote Airway Secretion Clearance  Flowsheets (Taken 11/22/2024 0228)  Cough And Deep Breathing: done independently per patient  Activity Management:   Rolling - L1   Sitting at edge of bed - L2  Intervention: Optimize Oxygenation and Ventilation  Flowsheets (Taken 11/22/2024 0228)  Head of Bed (HOB) Positioning: HOB elevated     Problem: Fall Injury Risk  Goal: Absence of Fall and Fall-Related Injury  Outcome: Progressing  Intervention: Identify and Manage Contributors  Flowsheets (Taken 11/22/2024 0228)  Self-Care Promotion:   independence encouraged   BADL personal objects within reach   meal set-up provided  Medication Review/Management:   medications reviewed   high-risk medications identified  Intervention: Promote Injury-Free Environment  Flowsheets (Taken 11/22/2024 0228)  Safety Promotion/Fall Prevention:   assistive device/personal item within reach   bed alarm set   commode/urinal/bedpan at bedside   Fall Risk reviewed with patient/family   high risk medications identified   instructed to call staff for mobility   lighting adjusted   medications reviewed   nonskid shoes/socks when out of bed   side rails raised x 2     Problem: Skin Injury Risk Increased  Goal: Skin Health and Integrity  Outcome: Progressing  Intervention: Optimize Skin Protection  Flowsheets (Taken 11/22/2024 0228)  Pressure Reduction Techniques:   frequent weight shift encouraged   weight shift assistance provided  Pressure Reduction Devices: pressure-redistributing mattress utilized  Skin Protection:   incontinence pads utilized   skin sealant/moisture barrier  applied  Activity Management:   Rolling - L1   Sitting at edge of bed - L2  Head of Bed (HOB) Positioning: HOB elevated  Intervention: Promote and Optimize Oral Intake  Flowsheets (Taken 11/22/2024 0228)  Oral Nutrition Promotion: physical activity promoted

## 2024-11-22 NOTE — DISCHARGE SUMMARY
[facility] Medicine  Discharge Summary      Patient Name: Gloria Garcia  MRN: [id number]  SABI: [id number]  Patient Class: IP- Inpatient  Admission Date: 11/20/2024  Hospital Length of Stay: 1 days  Discharge Date and Time:  11/22/2024 3:10 PM  Attending Physician: Hailey att. providers found   Discharging Provider: Pinky Miller DNP  Primary Care Provider: Hailey, Primary Doctor    Primary Care Team: Networked reference to record PCT     HPI:   Ms. 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     She presents to ED via EMS due to shortness of breath that started yesterday and was not improved with home oxygen or inhalers. She was noted to be hypoxic (low 80s) and drowsy on EMS arrival with saturations at which time she was placed on NRB and given nebulizer treatment with improvement in saturations to mid 90s. She denies wheezing, cough, CP, abdominal pain, N/V/D, constipation, urinary symptoms or hematuria, decreased appetite, changes in PO intake, light headiness, dizziness, or headache however patient has dementia and is oriented to person and place only. NO family present. She states that she lives with her sister and her daughter lives in [place]. She reports independence in ADLs and does not use ambulatory aides.     In the ED she is hypertensive, heart rate stable, respiratory rate upper 20s, saturation stable on 2 L nasal cannula, and afebrile.  CBC with WBC 6, H/H 11.2/36.3, platelets 323.  Chemistry was , K 5.4, chloride 109, CO2 23, BUN 18, SCr 1.4, glucose 112.  LFTs unremarkable.  Lipase 94.  .  Troponin 0.019.  UA noninfectious.  ABG with pH 7.429, pCO2 45.4, pO2 28, HC03 30, saturated O2 55, base excess 6.  CXR without acute cardiopulmonary process.  CT A/P with no acute abdominal or pelvic pathology CT of the abdomen and pelvis with contrast. Bilateral renal  cortical scarring left greater than right.  Indeterminate area of low attenuation in the both kidneys.  Findings may be related to scarring rather than a discrete lesions.  If warranted, consider interval follow-up. Advanced vascular calcifications.    The patient was placed in observation to the Hospital Medicine Service for further evaluation and treatment.     * No surgery found *      Hospital Course:   Gloria Garcia was admitted for hypoxia at home.  She has been losing her inhalers and has a COPD exacerbation. After receiving breathing treatments and starting prednisone, she has been maintaining oxygen sats on admission. CXR unremarkable. VBG with normal pH. Discussed plan to dc her home discussed with her relative Meseret. Meseret mentioned that the pt loses her inhalers often and that is why she has been out. Pulmonology referral placed. Medications refilled. Ms Garcia will be discharged home with home health nurse followup.      Goals of Care Treatment Preferences:  Code Status: Full Code      SDOH Screening:  The patient was screened for utility difficulties, food insecurity, transport difficulties, housing insecurity, and interpersonal safety and there were no concerns identified this admission.     Consults:     Pulmonary  * Acute exacerbation of COPD with asthma  Asthma-COPD overlap syndrome   Chronic hypoxic respiratory failure on home oxygen  Former smoker   -placed in observation D/T acute COPD/asthma exacerbation  -at admission hypertensive and tachypneic with stable saturations on nasal cannula   -ED workup detailed above  -s/p multiple nebulizers and IV steroids via EMS and during ED course  -COPD pathway initiated   -continue scheduled DuoNebs while awake   -prednisone PO  -no indication for antibiotic therapy at current   -dose/medication adjustment as appropriate   -continue oxygen supplementation   - resp viral panel pending  -incentive spirometer for pulmonary toileting   -ABG PRN  concerns   -continue tele monitoring  -PRN supportive care as indicated  -trend labs, address/replete electrolytes as indicated    GI  Elevated lipase  -had complaints of abdominal pain at admission  -CT A/P detailed above however no acute findings  -lipase 94, no priors for comparison  -further CMP and lipase trending in AM: 55  - stable, advance diet as tolerated  - tolerating regular diet, normal BM 11/22      Final Active Diagnoses:    Diagnosis Date Noted POA    PRINCIPAL PROBLEM:  Acute exacerbation of COPD with asthma [J44.1] 07/09/2024 Yes    Asthma-COPD overlap syndrome [J44.89] 11/20/2024 Yes    Former smoker [Z87.891] 11/20/2024 Not Applicable    Elevated lipase [R74.8] 11/20/2024 Yes    Chronic hypoxic respiratory failure, on home oxygen therapy [J96.11, Z99.81] 11/20/2024 Not Applicable    Hypertensive urgency [I16.0] 07/10/2024 Yes    Essential hypertension [I10] 03/28/2020 Yes     Chronic      Problems Resolved During this Admission:       Discharged Condition: good    Disposition: Home or Self Care    Follow Up:   Follow-up Information       [facility] - Emergency Dept. Go to .    Specialty: Emergency Medicine  Why: As needed, If symptoms worsen  Contact information:  [address] [facility] [address] [phone]             [facility] - New Follow up on 11/23/2024.    Specialty: Home Health Services  Why: They will contact you for home healthcare appointments.  Contact information:  [facility] [address] [phone]                           Patient Instructions:      Ambulatory referral/consult to Pulmonology   Standing Status: Future   Referral Priority: Routine Referral Type: Consultation   Referral Reason: Specialty Services Required   Requested Specialty: Pulmonary Disease   Number of Visits Requested: 1     Diet Adult Regular     Notify your health care provider if you experience any of the following:  temperature >100.4     Notify your health  care provider if you experience any of the following:  difficulty breathing or increased cough     Notify your health care provider if you experience any of the following:  increased confusion or weakness     Activity as tolerated       Significant Diagnostic Studies: N/A    Pending Diagnostic Studies:       None           Medications:  Reconciled Home Medications:      Medication List        START taking these medications      albuterol-ipratropium 2.5 mg-0.5 mg/3 mL nebulizer solution  Commonly known as: DUO-NEB  use 1 vial by nebulization every 4 (four) hours as needed for Wheezing or Shortness of Breath. Rescue     predniSONE 20 MG tablet  Commonly known as: DELTASONE  Take 2 tablets (40 mg total) by mouth once daily. for 2 days  Start taking on: November 23, 2024            CONTINUE taking these medications      amlodipine-benazepril 10-20mg 10-20 mg per capsule  Commonly known as: LOTREL  Take 1 capsule by mouth every morning.     ARICEPT 5 mg tablet  Generic drug: donepeziL  Take 5 mg by mouth every evening.     WIXELA INHUB 250-50 mcg/dose diskus inhaler  Generic drug: fluticasone-salmeterol 250-50 mcg/dose  Inhale 1 puff into the lungs Daily.            ASK your doctor about these medications      atorvastatin 10 MG tablet  Commonly known as: LIPITOR  Take 10 mg by mouth once daily.              Indwelling Lines/Drains at time of discharge:   Lines/Drains/Airways       Drain  Duration             Female External Urinary Catheter w/ Suction 11/21/24 1 day                    Time spent on the discharge of patient: 30 minutes         Pinky Miller DNP  Department of Hospital Medicine  [facility])

## 2024-11-22 NOTE — PLAN OF CARE
Medicare Message     Important Message from Medicare regarding Discharge Appeal Rights -- Explained to patient/caregiver; Other (comments)Important Message from Medicare regarding Discharge Appeal Rights. Explained to patient/caregiver; Other (comments). The comment is Verbal Consent. Taken on 11/22/24 1159   Date IMM was signed -- 11/22/2024   Time IMM was signed -- 0919

## 2024-11-22 NOTE — NURSING
IV removed with catheter in tact, tele discontinued. Patient received DC paperwork but was unable to follow along with DC nurse. Patient going home with home health and she will be picked up via wheel chair van. Awaiting ride now

## 2024-11-24 LAB
OHS QRS DURATION: 60 MS
OHS QTC CALCULATION: 434 MS

## 2025-02-18 ENCOUNTER — OFFICE VISIT (OUTPATIENT)
Dept: PULMONOLOGY | Facility: CLINIC | Age: 89
End: 2025-02-18
Payer: MEDICARE

## 2025-02-18 VITALS
BODY MASS INDEX: 14.79 KG/M2 | WEIGHT: 86.63 LBS | SYSTOLIC BLOOD PRESSURE: 150 MMHG | HEART RATE: 76 BPM | HEIGHT: 64 IN | DIASTOLIC BLOOD PRESSURE: 82 MMHG | OXYGEN SATURATION: 92 %

## 2025-02-18 DIAGNOSIS — J96.11 CHRONIC HYPOXIC RESPIRATORY FAILURE, ON HOME OXYGEN THERAPY: ICD-10-CM

## 2025-02-18 DIAGNOSIS — J44.1 COPD EXACERBATION: ICD-10-CM

## 2025-02-18 DIAGNOSIS — R06.02 SOB (SHORTNESS OF BREATH): ICD-10-CM

## 2025-02-18 DIAGNOSIS — J44.89 ASTHMA-COPD OVERLAP SYNDROME: Primary | ICD-10-CM

## 2025-02-18 DIAGNOSIS — Z99.81 CHRONIC HYPOXIC RESPIRATORY FAILURE, ON HOME OXYGEN THERAPY: ICD-10-CM

## 2025-02-18 DIAGNOSIS — J45.901 EXACERBATION OF ASTHMA, UNSPECIFIED ASTHMA SEVERITY, UNSPECIFIED WHETHER PERSISTENT: ICD-10-CM

## 2025-02-18 PROCEDURE — 99214 OFFICE O/P EST MOD 30 MIN: CPT | Mod: PBBFAC | Performed by: INTERNAL MEDICINE

## 2025-02-18 PROCEDURE — 99999 PR PBB SHADOW E&M-EST. PATIENT-LVL IV: CPT | Mod: PBBFAC,,, | Performed by: INTERNAL MEDICINE

## 2025-02-18 PROCEDURE — 99203 OFFICE O/P NEW LOW 30 MIN: CPT | Mod: S$PBB,,, | Performed by: INTERNAL MEDICINE

## 2025-02-18 RX ORDER — ALBUTEROL SULFATE 0.83 MG/ML
SOLUTION RESPIRATORY (INHALATION)
COMMUNITY
End: 2025-02-18

## 2025-02-18 RX ORDER — BUDESONIDE, GLYCOPYRROLATE, AND FORMOTEROL FUMARATE 160; 9; 4.8 UG/1; UG/1; UG/1
2 AEROSOL, METERED RESPIRATORY (INHALATION) 2 TIMES DAILY
Qty: 10.7 G | Refills: 5 | Status: SHIPPED | OUTPATIENT
Start: 2025-02-18

## 2025-02-18 RX ORDER — DOXYCYCLINE 100 MG/1
100 CAPSULE ORAL 2 TIMES DAILY
Status: ON HOLD | COMMUNITY
Start: 2024-10-09 | End: 2025-02-24 | Stop reason: HOSPADM

## 2025-02-18 RX ORDER — ALBUTEROL SULFATE 90 UG/1
2 INHALANT RESPIRATORY (INHALATION) EVERY 6 HOURS PRN
Qty: 18 G | Refills: 5 | Status: SHIPPED | OUTPATIENT
Start: 2025-02-18

## 2025-02-18 RX ORDER — BENZONATATE 100 MG/1
200 CAPSULE ORAL
Status: ON HOLD | COMMUNITY
Start: 2024-09-19 | End: 2025-02-24 | Stop reason: HOSPADM

## 2025-02-18 RX ORDER — PROMETHAZINE HYDROCHLORIDE 6.25 MG/5ML
5 SYRUP ORAL 2 TIMES DAILY PRN
Status: ON HOLD | COMMUNITY
Start: 2024-10-09 | End: 2025-02-24 | Stop reason: HOSPADM

## 2025-02-18 RX ORDER — IPRATROPIUM BROMIDE AND ALBUTEROL SULFATE 2.5; .5 MG/3ML; MG/3ML
3 SOLUTION RESPIRATORY (INHALATION) EVERY 4 HOURS PRN
Qty: 90 ML | Refills: 2 | Status: SHIPPED | OUTPATIENT
Start: 2025-02-18 | End: 2026-02-18

## 2025-02-18 NOTE — PROGRESS NOTES
Subjective:       Patient ID: Gloria Garcia is a 89 y.o. female.    Chief Complaint: No chief complaint on file.    HPI:   Gloria Garcia is a 89 y.o. female new to me w/ a reported h/o asthma/copd overlap who presents for an evaluation of asthma/copd and chronic hypoxic RF.     7 presentations to the ED for dyspnea in the last year based on available chart review.    History is limited. Presents w/ a niece who is not clear about her history. She attempted to call the patient's sister for clarification, but did not get a response. Patient occ answer questions.    Was given oxygen but does not use it. Niece reports there was a c/f safety and they are asking to have it taken away.    Reportedly dx w/ asthma as child.     Uses breztri but unclear how much.     Exposures:  Tobacco- Quit in . 2 ppd when she was smoking  Inhalational Agents- Denies  Mold- Denies    Family History of Lung Disease: Denies  Childhood History of Lung Disease: Denies    Review of Systems    As above    Social History     Tobacco Use    Smoking status: Former     Current packs/day: 0.00     Types: Cigarettes     Quit date: 3/28/1995     Years since quittin.9    Smokeless tobacco: Never   Substance Use Topics    Alcohol use: Yes     Comment: occasional/social       Review of patient's allergies indicates:  No Known Allergies  Past Medical History:   Diagnosis Date    Asthma     COPD exacerbation 2024    Hypertension      Past Surgical History:   Procedure Laterality Date    HYSTERECTOMY       Current Outpatient Medications on File Prior to Visit   Medication Sig    ARICEPT 5 mg tablet Take 5 mg by mouth every evening.    atorvastatin (LIPITOR) 10 MG tablet Take 10 mg by mouth once daily.     No current facility-administered medications on file prior to visit.       Objective:      Vitals:    25 1348   BP: (!) 150/82   BP Location: Right arm   Patient Position: Sitting   Pulse: 76   SpO2: (!) 92%   Weight: 39.3 kg (86 lb  "10.3 oz)   Height: 5' 4" (1.626 m)     Physical Exam   Neck: No tracheal deviation present.   Cardiovascular: Normal rate and regular rhythm.   Pulmonary/Chest: Normal expansion, symmetric chest wall expansion, effort normal and breath sounds normal. No respiratory distress. She has no wheezes. She has no rhonchi. She has no rales.   Abdominal: She exhibits no distension.   Musculoskeletal:         General: No edema.      Cervical back: Neck supple.   Lymphadenopathy: No supraclavicular adenopathy is present.     She has no cervical adenopathy.   Skin: Skin is warm and dry. No cyanosis or erythema. Nails show no clubbing.   Nursing note and vitals reviewed.      Personal Diagnostic Review    Laboratory:  11/20/24  VBG- 7.43/45.4/28  Bicarb- 21  Eosinophils- 0.0      CXR 11/20/24- Images personally reviewed. I agree w/ the radiologist who notes;  No acute process      6 MWT 11/11/24  Pulse Oximetry: 88  % SpO2 on room air at rest on 11/11/24.     "Pulse Oximetry:  88% SpO2 on room air at rest on 11/11/24.                             86% SpO2 on room air with activity/exercise on 11/11/24.                        92%  SpO2 on 2 liters nasal cannula oxgyen with activity/excercise on 11/11/24.      Assessment:     1. Asthma-COPD overlap syndrome  Assessment & Plan:  Compliance is an issue. Discussed inhaler regimen at length and the need to follow it daily.    - Breztri  - PRN neb  - Spacer ordered  - Strongly encouraged oxygen use and discussed risk/benefits  - Encouraged regular, progressive activity  - Declined vaccines    Orders:  -     budesonide-glycopyr-formoterol (BREZTRI AEROSPHERE) 160-9-4.8 mcg/actuation HFAA; Inhale 2 puffs into the lungs 2 (two) times a day.  Dispense: 10.7 g; Refill: 5  -     albuterol-ipratropium (DUO-NEB) 2.5 mg-0.5 mg/3 mL nebulizer solution; use 1 vial by nebulization every 4 (four) hours as needed for Wheezing or Shortness of Breath. Rescue  Dispense: 90 mL; Refill: 2  -     albuterol " (PROVENTIL/VENTOLIN HFA) 90 mcg/actuation inhaler; Inhale 2 puffs into the lungs every 6 (six) hours as needed. Rescue  Dispense: 18 g; Refill: 5  -     SPACER WITH MASK FOR HOME USE    2. COPD exacerbation  -     Ambulatory referral/consult to Pulmonology  -     Ambulatory referral/consult to Pulmonology    3. SOB (shortness of breath)  -     Ambulatory referral/consult to Pulmonology    4. Exacerbation of asthma, unspecified asthma severity, unspecified whether persistent  -     Ambulatory referral/consult to Pulmonology    5. Chronic hypoxic respiratory failure, on home oxygen therapy  Assessment & Plan:  As above    Orders:  -     Ambulatory referral/consult to Pulmonology  -     budesonide-glycopyr-formoterol (BREZTRI AEROSPHERE) 160-9-4.8 mcg/actuation HFAA; Inhale 2 puffs into the lungs 2 (two) times a day.  Dispense: 10.7 g; Refill: 5  -     albuterol-ipratropium (DUO-NEB) 2.5 mg-0.5 mg/3 mL nebulizer solution; use 1 vial by nebulization every 4 (four) hours as needed for Wheezing or Shortness of Breath. Rescue  Dispense: 90 mL; Refill: 2  -     albuterol (PROVENTIL/VENTOLIN HFA) 90 mcg/actuation inhaler; Inhale 2 puffs into the lungs every 6 (six) hours as needed. Rescue  Dispense: 18 g; Refill: 5  -     SPACER WITH MASK FOR HOME USE         30 minutes of total time spent on the encounter, which includes face to face time and non-face to face time preparing to see the patient (eg, review of tests), Obtaining and/or reviewing separately obtained history, Documenting clinical information in the electronic or other health record, Independently interpreting results (not separately reported) and communicating results to the patient/family/caregiver, or Care coordination (not separately reported).

## 2025-02-19 ENCOUNTER — TELEPHONE (OUTPATIENT)
Dept: PULMONOLOGY | Facility: CLINIC | Age: 89
End: 2025-02-19
Payer: MEDICARE

## 2025-02-19 NOTE — TELEPHONE ENCOUNTER
Spoke with pt daughter, informed her that I have received her message. Pt daughter states that patient needs oxygen for when she is out and about. Pt daughter states that pt does not have any portable oxygen. I verbalized to pt daughter that I understand and advised pt daughter that I will forward message to Dr Juares to review/advise. Pt daughter verbalized that she understand.

## 2025-02-19 NOTE — TELEPHONE ENCOUNTER
----- Message from Enid sent at 2/19/2025  9:45 AM CST -----  Regarding: Oxygen  Contact: Carol/daughter [phone]  Type:  Needs Medical AdviceWho Called: Joshould the patient rather a call back or a response via [facility]? callBest Call Back Number: [phone] Information: Calling to get orders for portable oxygen tanks when she has to travel, and schedule follow up appointment

## 2025-02-22 ENCOUNTER — HOSPITAL ENCOUNTER (INPATIENT)
Facility: HOSPITAL | Age: 89
LOS: 5 days | Discharge: NURSING, REHAB OR LONG-TERM CARE | DRG: 641 | End: 2025-02-27
Attending: EMERGENCY MEDICINE | Admitting: STUDENT IN AN ORGANIZED HEALTH CARE EDUCATION/TRAINING PROGRAM
Payer: MEDICARE

## 2025-02-22 DIAGNOSIS — E83.42 HYPOMAGNESEMIA: ICD-10-CM

## 2025-02-22 DIAGNOSIS — E87.6 HYPOKALEMIA: Primary | ICD-10-CM

## 2025-02-22 DIAGNOSIS — R07.9 CHEST PAIN: ICD-10-CM

## 2025-02-22 DIAGNOSIS — R94.31 QT PROLONGATION: ICD-10-CM

## 2025-02-22 DIAGNOSIS — R55 SYNCOPE: ICD-10-CM

## 2025-02-22 DIAGNOSIS — R55 SYNCOPE, UNSPECIFIED SYNCOPE TYPE: ICD-10-CM

## 2025-02-22 DIAGNOSIS — E83.51 HYPOCALCEMIA: ICD-10-CM

## 2025-02-22 PROBLEM — N30.00 ACUTE CYSTITIS WITHOUT HEMATURIA: Status: ACTIVE | Noted: 2025-02-22

## 2025-02-22 LAB
ALBUMIN SERPL BCP-MCNC: 2.4 G/DL (ref 3.5–5.2)
ALP SERPL-CCNC: 43 U/L (ref 40–150)
ALT SERPL W/O P-5'-P-CCNC: <5 U/L (ref 10–44)
ANION GAP SERPL CALC-SCNC: 4 MMOL/L (ref 8–16)
AST SERPL-CCNC: 23 U/L (ref 10–40)
BACTERIA #/AREA URNS AUTO: ABNORMAL /HPF
BASOPHILS # BLD AUTO: 0.03 K/UL (ref 0–0.2)
BASOPHILS NFR BLD: 0.6 % (ref 0–1.9)
BILIRUB SERPL-MCNC: 0.3 MG/DL (ref 0.1–1)
BILIRUB UR QL STRIP: NEGATIVE
BNP SERPL-MCNC: 83 PG/ML (ref 0–99)
BUN SERPL-MCNC: 17 MG/DL (ref 8–23)
BUN SERPL-MCNC: 22 MG/DL (ref 6–30)
CALCIUM SERPL-MCNC: 6 MG/DL (ref 8.7–10.5)
CHLORIDE SERPL-SCNC: 111 MMOL/L (ref 95–110)
CHLORIDE SERPL-SCNC: 118 MMOL/L (ref 95–110)
CLARITY UR REFRACT.AUTO: ABNORMAL
CO2 SERPL-SCNC: 19 MMOL/L (ref 23–29)
COLOR UR AUTO: YELLOW
CREAT SERPL-MCNC: 0.7 MG/DL (ref 0.5–1.4)
CREAT SERPL-MCNC: 1 MG/DL (ref 0.5–1.4)
DIFFERENTIAL METHOD BLD: ABNORMAL
EOSINOPHIL # BLD AUTO: 0.1 K/UL (ref 0–0.5)
EOSINOPHIL NFR BLD: 1.8 % (ref 0–8)
ERYTHROCYTE [DISTWIDTH] IN BLOOD BY AUTOMATED COUNT: 13.2 % (ref 11.5–14.5)
EST. GFR  (NO RACE VARIABLE): >60 ML/MIN/1.73 M^2
GLUCOSE SERPL-MCNC: 109 MG/DL (ref 70–110)
GLUCOSE SERPL-MCNC: 97 MG/DL (ref 70–110)
GLUCOSE UR QL STRIP: NEGATIVE
HCT VFR BLD AUTO: 33.7 % (ref 37–48.5)
HCT VFR BLD CALC: 30 %PCV (ref 36–54)
HCV AB SERPL QL IA: NORMAL
HGB BLD-MCNC: 10.3 G/DL (ref 12–16)
HGB UR QL STRIP: ABNORMAL
HIV 1+2 AB+HIV1 P24 AG SERPL QL IA: NORMAL
IMM GRANULOCYTES # BLD AUTO: 0.01 K/UL (ref 0–0.04)
IMM GRANULOCYTES NFR BLD AUTO: 0.2 % (ref 0–0.5)
KETONES UR QL STRIP: NEGATIVE
LEUKOCYTE ESTERASE UR QL STRIP: ABNORMAL
LYMPHOCYTES # BLD AUTO: 0.8 K/UL (ref 1–4.8)
LYMPHOCYTES NFR BLD: 15.7 % (ref 18–48)
MAGNESIUM SERPL-MCNC: 1.3 MG/DL (ref 1.6–2.6)
MCH RBC QN AUTO: 27.1 PG (ref 27–31)
MCHC RBC AUTO-ENTMCNC: 30.6 G/DL (ref 32–36)
MCV RBC AUTO: 89 FL (ref 82–98)
MICROSCOPIC COMMENT: ABNORMAL
MONOCYTES # BLD AUTO: 0.3 K/UL (ref 0.3–1)
MONOCYTES NFR BLD: 5.2 % (ref 4–15)
NEUTROPHILS # BLD AUTO: 3.9 K/UL (ref 1.8–7.7)
NEUTROPHILS NFR BLD: 76.5 % (ref 38–73)
NITRITE UR QL STRIP: NEGATIVE
NRBC BLD-RTO: 0 /100 WBC
PH UR STRIP: 6 [PH] (ref 5–8)
PLATELET # BLD AUTO: 249 K/UL (ref 150–450)
PMV BLD AUTO: 10.2 FL (ref 9.2–12.9)
POC IONIZED CALCIUM: 1.06 MMOL/L (ref 1.06–1.42)
POC TCO2 (MEASURED): 24 MMOL/L (ref 23–29)
POTASSIUM BLD-SCNC: 3.6 MMOL/L (ref 3.5–5.1)
POTASSIUM SERPL-SCNC: 3 MMOL/L (ref 3.5–5.1)
PROT SERPL-MCNC: 4.6 G/DL (ref 6–8.4)
PROT UR QL STRIP: ABNORMAL
PTH-INTACT SERPL-MCNC: 69.7 PG/ML (ref 9–77)
RBC # BLD AUTO: 3.8 M/UL (ref 4–5.4)
RBC #/AREA URNS AUTO: 11 /HPF (ref 0–4)
SAMPLE: ABNORMAL
SODIUM BLD-SCNC: 146 MMOL/L (ref 136–145)
SODIUM SERPL-SCNC: 141 MMOL/L (ref 136–145)
SP GR UR STRIP: 1.02 (ref 1–1.03)
SQUAMOUS #/AREA URNS AUTO: 1 /HPF
TROPONIN I SERPL DL<=0.01 NG/ML-MCNC: 5 NG/L (ref 0–14)
URN SPEC COLLECT METH UR: ABNORMAL
WBC # BLD AUTO: 5.04 K/UL (ref 3.9–12.7)
WBC #/AREA URNS AUTO: 17 /HPF (ref 0–5)

## 2025-02-22 PROCEDURE — 80053 COMPREHEN METABOLIC PANEL: CPT | Performed by: EMERGENCY MEDICINE

## 2025-02-22 PROCEDURE — 99285 EMERGENCY DEPT VISIT HI MDM: CPT | Mod: 25

## 2025-02-22 PROCEDURE — 81001 URINALYSIS AUTO W/SCOPE: CPT | Performed by: EMERGENCY MEDICINE

## 2025-02-22 PROCEDURE — 86803 HEPATITIS C AB TEST: CPT | Performed by: PHYSICIAN ASSISTANT

## 2025-02-22 PROCEDURE — 87389 HIV-1 AG W/HIV-1&-2 AB AG IA: CPT | Performed by: PHYSICIAN ASSISTANT

## 2025-02-22 PROCEDURE — 63600175 PHARM REV CODE 636 W HCPCS: Performed by: PHYSICIAN ASSISTANT

## 2025-02-22 PROCEDURE — 96360 HYDRATION IV INFUSION INIT: CPT

## 2025-02-22 PROCEDURE — 25000003 PHARM REV CODE 250: Performed by: PHYSICIAN ASSISTANT

## 2025-02-22 PROCEDURE — 85025 COMPLETE CBC W/AUTO DIFF WBC: CPT | Performed by: EMERGENCY MEDICINE

## 2025-02-22 PROCEDURE — 93005 ELECTROCARDIOGRAM TRACING: CPT

## 2025-02-22 PROCEDURE — 93010 ELECTROCARDIOGRAM REPORT: CPT | Mod: ,,, | Performed by: INTERNAL MEDICINE

## 2025-02-22 PROCEDURE — 84484 ASSAY OF TROPONIN QUANT: CPT | Performed by: EMERGENCY MEDICINE

## 2025-02-22 PROCEDURE — 83880 ASSAY OF NATRIURETIC PEPTIDE: CPT | Performed by: EMERGENCY MEDICINE

## 2025-02-22 PROCEDURE — 83735 ASSAY OF MAGNESIUM: CPT | Performed by: EMERGENCY MEDICINE

## 2025-02-22 PROCEDURE — 12000002 HC ACUTE/MED SURGE SEMI-PRIVATE ROOM

## 2025-02-22 PROCEDURE — 87086 URINE CULTURE/COLONY COUNT: CPT | Performed by: EMERGENCY MEDICINE

## 2025-02-22 PROCEDURE — 25000003 PHARM REV CODE 250: Performed by: EMERGENCY MEDICINE

## 2025-02-22 PROCEDURE — 63600175 PHARM REV CODE 636 W HCPCS: Performed by: EMERGENCY MEDICINE

## 2025-02-22 PROCEDURE — 83970 ASSAY OF PARATHORMONE: CPT | Performed by: PHYSICIAN ASSISTANT

## 2025-02-22 RX ORDER — POLYETHYLENE GLYCOL 3350 17 G/17G
17 POWDER, FOR SOLUTION ORAL DAILY PRN
Status: DISCONTINUED | OUTPATIENT
Start: 2025-02-22 | End: 2025-02-27 | Stop reason: HOSPADM

## 2025-02-22 RX ORDER — LISINOPRIL 20 MG/1
20 TABLET ORAL DAILY
Status: DISCONTINUED | OUTPATIENT
Start: 2025-02-22 | End: 2025-02-24

## 2025-02-22 RX ORDER — AMLODIPINE BESYLATE 10 MG/1
10 TABLET ORAL DAILY
Status: DISCONTINUED | OUTPATIENT
Start: 2025-02-23 | End: 2025-02-25

## 2025-02-22 RX ORDER — LANOLIN ALCOHOL/MO/W.PET/CERES
400 CREAM (GRAM) TOPICAL
Status: COMPLETED | OUTPATIENT
Start: 2025-02-22 | End: 2025-02-22

## 2025-02-22 RX ORDER — AMLODIPINE AND BENAZEPRIL HYDROCHLORIDE 5; 10 MG/1; MG/1
2 CAPSULE ORAL DAILY
Status: DISCONTINUED | OUTPATIENT
Start: 2025-02-23 | End: 2025-02-22

## 2025-02-22 RX ORDER — IBUPROFEN 200 MG
24 TABLET ORAL
Status: DISCONTINUED | OUTPATIENT
Start: 2025-02-22 | End: 2025-02-27 | Stop reason: HOSPADM

## 2025-02-22 RX ORDER — HEPARIN SODIUM 5000 [USP'U]/ML
5000 INJECTION, SOLUTION INTRAVENOUS; SUBCUTANEOUS EVERY 12 HOURS
Status: DISCONTINUED | OUTPATIENT
Start: 2025-02-23 | End: 2025-02-27 | Stop reason: HOSPADM

## 2025-02-22 RX ORDER — IBUPROFEN 200 MG
16 TABLET ORAL
Status: DISCONTINUED | OUTPATIENT
Start: 2025-02-22 | End: 2025-02-27 | Stop reason: HOSPADM

## 2025-02-22 RX ORDER — CALCIUM GLUCONATE 20 MG/ML
1 INJECTION, SOLUTION INTRAVENOUS
Status: COMPLETED | OUTPATIENT
Start: 2025-02-22 | End: 2025-02-22

## 2025-02-22 RX ORDER — IPRATROPIUM BROMIDE AND ALBUTEROL SULFATE 2.5; .5 MG/3ML; MG/3ML
3 SOLUTION RESPIRATORY (INHALATION) EVERY 4 HOURS PRN
Status: DISCONTINUED | OUTPATIENT
Start: 2025-02-22 | End: 2025-02-27 | Stop reason: HOSPADM

## 2025-02-22 RX ORDER — GLUCAGON 1 MG
1 KIT INJECTION
Status: DISCONTINUED | OUTPATIENT
Start: 2025-02-22 | End: 2025-02-27 | Stop reason: HOSPADM

## 2025-02-22 RX ORDER — MAGNESIUM SULFATE HEPTAHYDRATE 40 MG/ML
2 INJECTION, SOLUTION INTRAVENOUS ONCE
Status: COMPLETED | OUTPATIENT
Start: 2025-02-22 | End: 2025-02-22

## 2025-02-22 RX ORDER — SODIUM CHLORIDE 0.9 % (FLUSH) 0.9 %
10 SYRINGE (ML) INJECTION
Status: DISCONTINUED | OUTPATIENT
Start: 2025-02-22 | End: 2025-02-27 | Stop reason: HOSPADM

## 2025-02-22 RX ORDER — CEFTRIAXONE 1 G/1
1 INJECTION, POWDER, FOR SOLUTION INTRAMUSCULAR; INTRAVENOUS
Status: DISCONTINUED | OUTPATIENT
Start: 2025-02-22 | End: 2025-02-24

## 2025-02-22 RX ORDER — ENOXAPARIN SODIUM 100 MG/ML
40 INJECTION SUBCUTANEOUS EVERY 24 HOURS
Status: DISCONTINUED | OUTPATIENT
Start: 2025-02-23 | End: 2025-02-22

## 2025-02-22 RX ORDER — ACETAMINOPHEN 325 MG/1
650 TABLET ORAL EVERY 4 HOURS PRN
Status: DISCONTINUED | OUTPATIENT
Start: 2025-02-22 | End: 2025-02-27 | Stop reason: HOSPADM

## 2025-02-22 RX ORDER — TALC
6 POWDER (GRAM) TOPICAL NIGHTLY PRN
Status: DISCONTINUED | OUTPATIENT
Start: 2025-02-22 | End: 2025-02-27 | Stop reason: HOSPADM

## 2025-02-22 RX ADMIN — Medication 400 MG: at 06:02

## 2025-02-22 RX ADMIN — POTASSIUM BICARBONATE 50 MEQ: 978 TABLET, EFFERVESCENT ORAL at 06:02

## 2025-02-22 RX ADMIN — CALCIUM GLUCONATE 1 G: 20 INJECTION, SOLUTION INTRAVENOUS at 06:02

## 2025-02-22 RX ADMIN — CEFTRIAXONE 1 G: 1 INJECTION, POWDER, FOR SOLUTION INTRAMUSCULAR; INTRAVENOUS at 09:02

## 2025-02-22 RX ADMIN — MAGNESIUM SULFATE HEPTAHYDRATE 2 G: 40 INJECTION, SOLUTION INTRAVENOUS at 06:02

## 2025-02-22 RX ADMIN — LISINOPRIL 20 MG: 20 TABLET ORAL at 09:02

## 2025-02-22 RX ADMIN — SODIUM CHLORIDE 500 ML: 9 INJECTION, SOLUTION INTRAVENOUS at 05:02

## 2025-02-22 NOTE — ED TRIAGE NOTES
Pt reports having as asthma attack but then when asked about a syncopal episode pt reports yes, pt denies hitting head, denies blood thinner use; pt alert and oriented to person place but not time

## 2025-02-22 NOTE — ED PROVIDER NOTES
Encounter Date: 2/22/2025       History     Chief Complaint   Patient presents with    Loss of Consciousness     Arrives via EMS from home. C/o constipation and when stood up abruptly, had a witnessed syncopal episode. No head trauma. Woke up and then had BM. Pt now reports generalized weakness and dizziness.      HPI  89-year-old female with past medical history as noted below coming in with syncopal episode.    Per daughter who was with her she was sitting next to a heater started complaining of some abdominal cramping and then proceeded to pass out for 1-2 minutes no seizure-like activity.  She was sitting no falls or trauma.  After she came to she walked to the bathroom had a bowel movement and came back but she was still woozy/dizzy after and so EMS decided to take her to the emergency department for re-evaluation.    Prior to the event she was in her usual state of health with no complaints had in food had no other complaints fevers or other issues.    Niece does note that this is happened to her before 2-3 times in the setting of needing to have a bowel movement and passing out.    Currently with the patient as she says she feels fine and has no complaints.    Review of patient's allergies indicates:  No Known Allergies  Past Medical History:   Diagnosis Date    Asthma     COPD exacerbation 7/9/2024    Hypertension      Past Surgical History:   Procedure Laterality Date    HYSTERECTOMY       Family History   Problem Relation Name Age of Onset    Hypertension Mother      Stroke Father       Social History[1]  Review of Systems    Physical Exam     Initial Vitals [02/22/25 1552]   BP Pulse Resp Temp SpO2   (!) 166/92 61 16 97.8 °F (36.6 °C) 97 %      MAP       --         Physical Exam    Physical Exam:  CONSTITUTIONAL: Well developed, well nourished, in no acute distress.  HENT: Normocephalic, atraumatic    EYES: Sclerae anicteric, pupils equal round reactive, extraocular is are intact, no nystagmus.  NECK:  Supple, no thyroid enlargement  CARDIOVASCULAR: Regular rate and rhythm without any murmurs, gallops, rubs.  RESPIRATORY: Speaking in full sentences. Breathing comfortably. Auscultation of the lungs revealed normal breath sounds b/l, no wheezing, no rales, no rhonchi.  ABDOMEN: Soft and nontender, no masses, no rebound or guarding   RECTAL:  No impaction, small amount of brown stool on the glove.  NEUROLOGIC: Alert, interacting normally. No facial droop. Voice is clear. Speech is fluent.  5/5 strength bilaterally upper and lower extremities  MSK: Moving all four extremities.  SKIN: Warm and dry. No visible rash on exposed areas of skin.    Psych: Mood and affect normal.       ED Course   Procedures  Labs Reviewed   CBC W/ AUTO DIFFERENTIAL - Abnormal       Result Value    WBC 5.04      RBC 3.80 (*)     Hemoglobin 10.3 (*)     Hematocrit 33.7 (*)     MCV 89      MCH 27.1      MCHC 30.6 (*)     RDW 13.2      Platelets 249      MPV 10.2      Immature Granulocytes 0.2      Gran # (ANC) 3.9      Immature Grans (Abs) 0.01      Lymph # 0.8 (*)     Mono # 0.3      Eos # 0.1      Baso # 0.03      nRBC 0      Gran % 76.5 (*)     Lymph % 15.7 (*)     Mono % 5.2      Eosinophil % 1.8      Basophil % 0.6      Differential Method Automated      Narrative:     Release to patient->Immediate   COMPREHENSIVE METABOLIC PANEL - Abnormal    Sodium 141      Potassium 3.0 (*)     Chloride 118 (*)     CO2 19 (*)     Glucose 109      BUN 17      Creatinine 0.7      Calcium 6.0 (*)     Total Protein 4.6 (*)     Albumin 2.4 (*)     Total Bilirubin 0.3      Alkaline Phosphatase 43      AST 23      ALT <5 (*)     eGFR >60.0      Anion Gap 4 (*)     Narrative:     Release to patient->Immediate   MAGNESIUM - Abnormal    Magnesium 1.3 (*)     Narrative:     Release to patient->Immediate   ISTAT PROCEDURE - Abnormal    POC Glucose 97      POC BUN 22      POC Creatinine 1.0      POC Sodium 146 (*)     POC Potassium 3.6      POC Chloride 111 (*)      POC TCO2 (MEASURED) 24      POC Ionized Calcium 1.06      POC Hematocrit 30 (*)     Sample GINNY     HEPATITIS C ANTIBODY    Hepatitis C Ab Non-reactive      Narrative:     Release to patient->Immediate   HIV 1 / 2 ANTIBODY    HIV 1/2 Ag/Ab Non-reactive      Narrative:     Release to patient->Immediate   TROPONIN I HIGH SENSITIVITY    Troponin I High Sensitivity 5      Narrative:     Release to patient->Immediate   B-TYPE NATRIURETIC PEPTIDE    BNP 83      Narrative:     Release to patient->Immediate   MAGNESIUM   PTH, INTACT   URINALYSIS, REFLEX TO URINE CULTURE   URINALYSIS, REFLEX TO URINE CULTURE   ISTAT CHEM8          Imaging Results              X-Ray Chest AP Portable (Final result)  Result time 02/22/25 19:10:22      Final result by Javier Kraus DO (02/22/25 19:10:22)                   Impression:      No acute abnormality.      Electronically signed by: Javier Kraus  Date:    02/22/2025  Time:    19:10               Narrative:    EXAMINATION:  XR CHEST AP PORTABLE    CLINICAL HISTORY:  Syncope;    TECHNIQUE:  Single frontal view of the chest was performed.    COMPARISON:  11/20/2024.    FINDINGS:  There are skin folds overlying the right lung.  The lungs are well expanded and clear.  No focal opacities are seen.  The pleural spaces are clear the cardiac silhouette is unremarkable.  There are calcifications of the aortic arch.  Osseous structures demonstrate degenerative changes.                                       Medications   magnesium sulfate 2g in water 50mL IVPB (premix) (2 g Intravenous New Bag 2/22/25 1858)   calcium gluconate 1 g in NS IVPB (premixed) (1 g Intravenous New Bag 2/22/25 1858)   sodium chloride 0.9% flush 10 mL (has no administration in time range)   melatonin tablet 6 mg (has no administration in time range)   albuterol-ipratropium 2.5 mg-0.5 mg/3 mL nebulizer solution 3 mL (has no administration in time range)   polyethylene glycol packet 17 g (has no administration in time  range)   acetaminophen tablet 650 mg (has no administration in time range)   glucose chewable tablet 16 g (has no administration in time range)   glucose chewable tablet 24 g (has no administration in time range)   dextrose 50% injection 12.5 g (has no administration in time range)   dextrose 50% injection 25 g (has no administration in time range)   glucagon (human recombinant) injection 1 mg (has no administration in time range)   heparin (porcine) injection 5,000 Units (has no administration in time range)   sodium chloride 0.9% bolus 500 mL 500 mL (0 mLs Intravenous Stopped 2/22/25 1831)   potassium bicarbonate disintegrating tablet 50 mEq (50 mEq Oral Given 2/22/25 1838)   magnesium oxide tablet 400 mg (400 mg Oral Given 2/22/25 1837)     Medical Decision Making  Amount and/or Complexity of Data Reviewed  Independent Historian: caregiver     Details: Part of history obtained from niece.  External Data Reviewed: labs.  Radiology: ordered.  ECG/medicine tests: ordered and independent interpretation performed.    Risk  OTC drugs.  Prescription drug management.  Decision regarding hospitalization.      89-year-old female with past history as noted coming in with syncopal episode in the context of sitting next to here and having some stomach cramping.  She subsequently came to and had a bowel movement and felt better.    Currently she denies any complaints including headache, chest pain, shortness breath, abdominal pain, dizziness or weakness.  No burning when she pees.  No fevers or cough.    Given her age, poor historian will obtain evaluation to look for any metabolic hematologic abnormalities, UTI, pulmonary abnormalities.    Considered brain CT however she is now to baseline mental status, she has no headache, she has no focal neurologic complaints or findings.  There has been no trauma, she has had previous similar episodes, at this time there is no indication for CT scan.    This happened in the context of  having having to have a bowel movement, rectal exam undertaken, she is not impacted.  Indeed the niece says she had a bowel movement after the episode.    She was initially complaining of some abdominal pain prior to the episode currently she denies abdominal pain has a benign abdominal exam, no indication for emergent CT scan at this point will continue to monitor in the emergency department and follow up labs if concerning findings may warrant CT imaging.    Will give small bolus of IV fluids for hydration.    She appears to be well taken care of and monitored at home by her niece.    Continue to monitor in the emergency department.  If she continues to be at baseline with no complaints and her workup is entirely unremarkable anticipate likely discharge home with outpatient follow-up and ED return precautions.  If concerning findings on labs may need admission for continued monitoring.    Update:  In the ED she remains hemodynamically stable.  Hypertensive.  Labs are significant for hypokalemia to 3.0, hypomagnesemia, hypocalcemia.  Despite correction still hypocalcemic.  Unclear cause.    Will add on i-STAT came to a 4 ionized calcium as well as PTH.    Given her age, syncopal episode, electrolyte abnormalities will admit to Hospital Medicine for continued monitoring, evaluation, electrolyte repletion and continued evaluation.    Decision was made to hospitalize the patient.  Acute illness with threat to bodily function.  Discussed with hospital medicine.                                          Clinical Impression:  Final diagnoses:  [R55] Syncope  [E87.6] Hypokalemia (Primary)  [E83.42] Hypomagnesemia  [E83.51] Hypocalcemia  [R07.9] Chest pain          ED Disposition Condition    Admit Stable                    [1]   Social History  Tobacco Use    Smoking status: Former     Current packs/day: 0.00     Types: Cigarettes     Quit date: 3/28/1995     Years since quittin.9    Smokeless tobacco: Never    Substance Use Topics    Alcohol use: Yes     Comment: occasional/social    Drug use: Never        Bahman Calvillo MD  02/22/25 1942

## 2025-02-23 PROBLEM — D64.9 ANEMIA: Status: ACTIVE | Noted: 2025-02-23

## 2025-02-23 LAB
ANION GAP SERPL CALC-SCNC: 7 MMOL/L (ref 8–16)
BASOPHILS # BLD AUTO: 0.04 K/UL (ref 0–0.2)
BASOPHILS NFR BLD: 0.9 % (ref 0–1.9)
BUN SERPL-MCNC: 16 MG/DL (ref 8–23)
CALCIUM SERPL-MCNC: 8.8 MG/DL (ref 8.7–10.5)
CHLORIDE SERPL-SCNC: 106 MMOL/L (ref 95–110)
CK SERPL-CCNC: 71 U/L (ref 20–180)
CO2 SERPL-SCNC: 25 MMOL/L (ref 23–29)
CREAT SERPL-MCNC: 1 MG/DL (ref 0.5–1.4)
DIFFERENTIAL METHOD BLD: ABNORMAL
EOSINOPHIL # BLD AUTO: 0.2 K/UL (ref 0–0.5)
EOSINOPHIL NFR BLD: 3.7 % (ref 0–8)
ERYTHROCYTE [DISTWIDTH] IN BLOOD BY AUTOMATED COUNT: 13.3 % (ref 11.5–14.5)
EST. GFR  (NO RACE VARIABLE): 54.5 ML/MIN/1.73 M^2
ESTIMATED AVG GLUCOSE: 103 MG/DL (ref 68–131)
GLUCOSE SERPL-MCNC: 80 MG/DL (ref 70–110)
HBA1C MFR BLD: 5.2 % (ref 4–5.6)
HCT VFR BLD AUTO: 33 % (ref 37–48.5)
HGB BLD-MCNC: 10 G/DL (ref 12–16)
IMM GRANULOCYTES # BLD AUTO: 0.01 K/UL (ref 0–0.04)
IMM GRANULOCYTES NFR BLD AUTO: 0.2 % (ref 0–0.5)
LYMPHOCYTES # BLD AUTO: 1.4 K/UL (ref 1–4.8)
LYMPHOCYTES NFR BLD: 29.5 % (ref 18–48)
MAGNESIUM SERPL-MCNC: 2.6 MG/DL (ref 1.6–2.6)
MCH RBC QN AUTO: 26.5 PG (ref 27–31)
MCHC RBC AUTO-ENTMCNC: 30.3 G/DL (ref 32–36)
MCV RBC AUTO: 88 FL (ref 82–98)
MONOCYTES # BLD AUTO: 0.4 K/UL (ref 0.3–1)
MONOCYTES NFR BLD: 8.2 % (ref 4–15)
NEUTROPHILS # BLD AUTO: 2.7 K/UL (ref 1.8–7.7)
NEUTROPHILS NFR BLD: 57.5 % (ref 38–73)
NRBC BLD-RTO: 0 /100 WBC
OHS QRS DURATION: 68 MS
OHS QTC CALCULATION: 466 MS
PHOSPHATE SERPL-MCNC: 2.5 MG/DL (ref 2.7–4.5)
PLATELET # BLD AUTO: 246 K/UL (ref 150–450)
PMV BLD AUTO: 9.6 FL (ref 9.2–12.9)
POCT GLUCOSE: 108 MG/DL (ref 70–110)
POCT GLUCOSE: 79 MG/DL (ref 70–110)
POCT GLUCOSE: 84 MG/DL (ref 70–110)
POCT GLUCOSE: 97 MG/DL (ref 70–110)
POTASSIUM SERPL-SCNC: 4.7 MMOL/L (ref 3.5–5.1)
RBC # BLD AUTO: 3.77 M/UL (ref 4–5.4)
SODIUM SERPL-SCNC: 138 MMOL/L (ref 136–145)
WBC # BLD AUTO: 4.65 K/UL (ref 3.9–12.7)

## 2025-02-23 PROCEDURE — 94761 N-INVAS EAR/PLS OXIMETRY MLT: CPT

## 2025-02-23 PROCEDURE — 25000003 PHARM REV CODE 250: Performed by: HOSPITALIST

## 2025-02-23 PROCEDURE — 83735 ASSAY OF MAGNESIUM: CPT | Performed by: PHYSICIAN ASSISTANT

## 2025-02-23 PROCEDURE — 80048 BASIC METABOLIC PNL TOTAL CA: CPT | Performed by: PHYSICIAN ASSISTANT

## 2025-02-23 PROCEDURE — 63600175 PHARM REV CODE 636 W HCPCS: Performed by: PHYSICIAN ASSISTANT

## 2025-02-23 PROCEDURE — 83036 HEMOGLOBIN GLYCOSYLATED A1C: CPT | Performed by: PHYSICIAN ASSISTANT

## 2025-02-23 PROCEDURE — 25000003 PHARM REV CODE 250: Performed by: PHYSICIAN ASSISTANT

## 2025-02-23 PROCEDURE — 20600001 HC STEP DOWN PRIVATE ROOM

## 2025-02-23 PROCEDURE — 85025 COMPLETE CBC W/AUTO DIFF WBC: CPT | Performed by: PHYSICIAN ASSISTANT

## 2025-02-23 PROCEDURE — 84100 ASSAY OF PHOSPHORUS: CPT | Performed by: PHYSICIAN ASSISTANT

## 2025-02-23 PROCEDURE — 82550 ASSAY OF CK (CPK): CPT | Performed by: PHYSICIAN ASSISTANT

## 2025-02-23 RX ORDER — HYDRALAZINE HYDROCHLORIDE 25 MG/1
25 TABLET, FILM COATED ORAL EVERY 8 HOURS
Status: DISCONTINUED | OUTPATIENT
Start: 2025-02-23 | End: 2025-02-23

## 2025-02-23 RX ORDER — HYDRALAZINE HYDROCHLORIDE 25 MG/1
25 TABLET, FILM COATED ORAL EVERY 8 HOURS PRN
Status: DISCONTINUED | OUTPATIENT
Start: 2025-02-23 | End: 2025-02-23

## 2025-02-23 RX ORDER — SODIUM,POTASSIUM PHOSPHATES 280-250MG
1 POWDER IN PACKET (EA) ORAL ONCE
Status: COMPLETED | OUTPATIENT
Start: 2025-02-23 | End: 2025-02-23

## 2025-02-23 RX ORDER — HYDRALAZINE HYDROCHLORIDE 25 MG/1
25 TABLET, FILM COATED ORAL EVERY 8 HOURS PRN
Status: DISCONTINUED | OUTPATIENT
Start: 2025-02-24 | End: 2025-02-24

## 2025-02-23 RX ORDER — ATORVASTATIN CALCIUM 10 MG/1
10 TABLET, FILM COATED ORAL NIGHTLY
Status: DISCONTINUED | OUTPATIENT
Start: 2025-02-23 | End: 2025-02-27 | Stop reason: HOSPADM

## 2025-02-23 RX ADMIN — HEPARIN SODIUM 5000 UNITS: 5000 INJECTION INTRAVENOUS; SUBCUTANEOUS at 08:02

## 2025-02-23 RX ADMIN — HYDRALAZINE HYDROCHLORIDE 25 MG: 25 TABLET ORAL at 05:02

## 2025-02-23 RX ADMIN — CEFTRIAXONE 1 G: 1 INJECTION, POWDER, FOR SOLUTION INTRAMUSCULAR; INTRAVENOUS at 08:02

## 2025-02-23 RX ADMIN — LISINOPRIL 20 MG: 20 TABLET ORAL at 08:02

## 2025-02-23 RX ADMIN — AMLODIPINE BESYLATE 10 MG: 10 TABLET ORAL at 08:02

## 2025-02-23 RX ADMIN — ATORVASTATIN CALCIUM 10 MG: 10 TABLET, FILM COATED ORAL at 08:02

## 2025-02-23 RX ADMIN — POTASSIUM & SODIUM PHOSPHATES POWDER PACK 280-160-250 MG 1 PACKET: 280-160-250 PACK at 08:02

## 2025-02-23 NOTE — H&P
[facility] - Emergency Dept  [facility] Medicine  History & Physical    Patient Name: Gloria Garcia  MRN: [id number]  Patient Class: IP- Inpatient  Admission Date: 2/22/2025  Attending Physician: Shadi Wray,*   Primary Care Provider: Hailey, Primary Doctor         Patient information was obtained from patient, past medical records, and ER records.     Subjective:     Principal Problem:Syncope    Chief Complaint:   Chief Complaint   Patient presents with    Loss of Consciousness     Arrives via EMS from home. C/o constipation and when stood up abruptly, had a witnessed syncopal episode. No head trauma. Woke up and then had BM. Pt now reports generalized weakness and dizziness.         HPI: Gloria Garcia is a 89 y.o. female with a PMHx of HTN, dementia, COPD, constipation and asthma who presents to [facility] for evaluation of syncope. Patient is a poor historian. She reports suddenly passing out while sitting down watching TV earlier today. She's unsure how long she was out for. Denies any fall or head trauma, though unreliable historian. She complained of dizziness and weakness to ED provider, however she denies these symptoms at the time of my exam. Denies any preceding lightheadedness, chest pain, or SOB earlier today. She's unsure if she took her home meds this AM. She is otherwise feeling well without any complaints of chest pain, LE swelling, headache, weakness, or SOB.    ED: hypertensive to /95, vitals otherwise stable. No leukocytosis. K 3.0, Mag 1.3, Ca 6.0 (corrected 7.3). UA infectious. Given 500cc IVFs, IV Ca gluconate, IV mag, and PO klyte    Past Medical History:   Diagnosis Date    Asthma     COPD exacerbation 7/9/2024    Hypertension        Past Surgical History:   Procedure Laterality Date    HYSTERECTOMY         Review of patient's allergies indicates:  No Known Allergies    No current facility-administered medications on file prior to encounter.     Current Outpatient Medications on File  Prior to Encounter   Medication Sig    albuterol (PROVENTIL/VENTOLIN HFA) 90 mcg/actuation inhaler Inhale 2 puffs into the lungs every 6 (six) hours as needed. Rescue    albuterol-ipratropium (DUO-NEB) 2.5 mg-0.5 mg/3 mL nebulizer solution use 1 vial by nebulization every 4 (four) hours as needed for Wheezing or Shortness of Breath. Rescue    amlodipine-benazepril 10-20mg (LOTREL) 10-20 mg per capsule Take 1 capsule by mouth every morning.    ARICEPT 5 mg tablet Take 5 mg by mouth every evening.    atorvastatin (LIPITOR) 10 MG tablet Take 10 mg by mouth once daily.    benzonatate (TESSALON) 100 MG capsule Take 200 mg by mouth.    budesonide-glycopyr-formoterol (BREZTRI AEROSPHERE) 160-9-4.8 mcg/actuation HFAA Inhale 2 puffs into the lungs 2 (two) times a day.    doxycycline (MONODOX) 100 MG capsule Take 100 mg by mouth 2 (two) times daily.    promethazine (PHENERGAN) 6.25 mg/5 mL syrup Take 5 mLs by mouth 2 (two) times daily as needed.     Family History       Problem Relation (Age of Onset)    Hypertension Mother    Stroke Father          Tobacco Use    Smoking status: Former     Current packs/day: 0.00     Types: Cigarettes     Quit date: 3/28/1995     Years since quittin.9    Smokeless tobacco: Never   Substance and Sexual Activity    Alcohol use: Yes     Comment: occasional/social    Drug use: Never    Sexual activity: Not Currently     Review of Systems   Unable to perform ROS: Dementia     Objective:     Vital Signs (Most Recent):  Temp: 98.5 °F (36.9 °C) (25)  Pulse: 64 (25)  Resp: 14 (25)  BP: (!) 201/87 (25)  SpO2: 96 % (25) Vital Signs (24h Range):  Temp:  [97.8 °F (36.6 °C)-98.5 °F (36.9 °C)] 98.5 °F (36.9 °C)  Pulse:  [61-67] 64  Resp:  [14-24] 14  SpO2:  [95 %-97 %] 96 %  BP: (166-205)/(83-95) 201/87     Weight: 39 kg (86 lb)  Body mass index is 14.76 kg/m².     Physical Exam  Vitals and nursing note reviewed.   Constitutional:       General:  She is not in acute distress.     Appearance: She is well-developed.      Comments: Underweight appearing   HENT:      Head: Normocephalic and atraumatic.      Mouth/Throat:      Pharynx: No oropharyngeal exudate.   Eyes:      Conjunctiva/sclera: Conjunctivae normal.   Cardiovascular:      Rate and Rhythm: Normal rate and regular rhythm.      Heart sounds: Normal heart sounds.   Pulmonary:      Effort: Pulmonary effort is normal. No respiratory distress.      Breath sounds: Normal breath sounds. No wheezing.   Abdominal:      General: Bowel sounds are normal. There is no distension.      Palpations: Abdomen is soft.      Tenderness: There is no abdominal tenderness.   Musculoskeletal:         General: No tenderness. Normal range of motion.      Cervical back: Normal range of motion and neck supple.      Right lower leg: No edema.      Left lower leg: No edema.   Lymphadenopathy:      Cervical: No cervical adenopathy.   Skin:     General: Skin is warm and dry.      Capillary Refill: Capillary refill takes less than 2 seconds.      Findings: No rash.   Neurological:      General: No focal deficit present.      Mental Status: She is alert.      Cranial Nerves: No cranial nerve deficit.      Sensory: No sensory deficit.      Coordination: Coordination normal.      Comments: Oriented to person and , disoriented to date. Poor historian. 5/5 strength bilat upper & lower extremities   Psychiatric:         Behavior: Behavior normal.         Thought Content: Thought content normal.         Judgment: Judgment normal.                Significant Labs: All pertinent labs within the past 24 hours have been reviewed.  CBC:   Recent Labs   Lab 25  1720 25  1907   WBC 5.04  --    HGB 10.3*  --    HCT 33.7* 30*     --      CMP:   Recent Labs   Lab 25  1720      K 3.0*   *   CO2 19*      BUN 17   CREATININE 0.7   CALCIUM 6.0*   PROT 4.6*   ALBUMIN 2.4*   BILITOT 0.3   ALKPHOS 43   AST 23    ALT <5*   ANIONGAP 4*       Significant Imaging: I have reviewed all pertinent imaging results/findings within the past 24 hours.  X-Ray Chest AP Portable  Narrative: EXAMINATION:  XR CHEST AP PORTABLE    CLINICAL HISTORY:  Syncope;    TECHNIQUE:  Single frontal view of the chest was performed.    COMPARISON:  11/20/2024.    FINDINGS:  There are skin folds overlying the right lung.  The lungs are well expanded and clear.  No focal opacities are seen.  The pleural spaces are clear the cardiac silhouette is unremarkable.  There are calcifications of the aortic arch.  Osseous structures demonstrate degenerative changes.  Impression: No acute abnormality.    Electronically signed by: Javier Kraus  Date:    02/22/2025  Time:    19:10      Assessment/Plan:     * Syncope  - likely due to dehydration and electrolyte abnormalities, UTI likely contributing   - will check CTH given patient is a poor historian  - CXR, UA pending  - s/p 500cc IVFs  - echo ordered  - replace lytes  - monitor tele  - neuro checks q4hr    Hypocalcemia  - Ca 6.0, corrected for albumin is 7.3  - given 1g IV Ca gluconate in the ED  - further replacement as indicated  - trend daily labs    Acute cystitis without hematuria  - afebrile without leukocytosis  - continue IV rocephin 1g q24hrs  - follow urine cx    Hypomagnesemia  Patient has Abnormal Magnesium: hypomagnesemia. Will continue to monitor electrolytes closely. Will replace the affected electrolytes and repeat labs to be done after interventions completed. The patient's magnesium results have been reviewed and are listed below.  Recent Labs   Lab 02/22/25  1720   MG 1.3*        Hypokalemia  - K 3.0  - replace PO  - monitor daily labs  - tele     Asthma-COPD overlap syndrome  - no acute issues  - satting 97% on RA  - duonebs PRN    Essential hypertension  - uncontrolled BP in the setting of questionable med compliance  - home benazepril not on formulary-- will sub w/ lisinopril 20mg daily with  first dose tonight given unsure if pt took home meds today  - continue home amlodipine 10mg daily      VTE Risk Mitigation (From admission, onward)           Ordered     heparin (porcine) injection 5,000 Units  Every 12 hours         02/22/25 1913     IP VTE HIGH RISK PATIENT  Once         02/22/25 1902     Place sequential compression device  Until discontinued         02/22/25 1902     IP VTE HIGH RISK PATIENT  Once         02/22/25 1902                                    Verona Shepard PA-C  Department of Hospital Medicine  [facility] - Emergency Dept

## 2025-02-23 NOTE — ASSESSMENT & PLAN NOTE
ROD is likely due to pre-renal azotemia due to intravascular volume depletion. Baseline creatinine is  0.7 . Most recent creatinine and eGFR are listed below.  Recent Labs     02/22/25  1720 02/23/25  0419   CREATININE 0.7 1.0   EGFRNORACEVR >60.0 54.5*      Plan    - Avoid nephrotoxins and renally dose meds for GFR listed above  - Monitor urine output, serial BMP, and adjust therapy as needed  - renal sonogram and CPK levels   monitor on lisinopril

## 2025-02-23 NOTE — PLAN OF CARE
Patient aaox3. Patient complaints of no pain. Patient for PT/OT evaluation. Patient due meds administered and patient tolerating well. Plan of care reviewed with patient and patient verbalizes understanding. Patient safety measures ongoing, call light within reach, fall precaution in place. Patient made comfortable in bed. No other concerns at this time.          Problem: Infection  Goal: Absence of Infection Signs and Symptoms  Outcome: Progressing     Problem: Adult Inpatient Plan of Care  Goal: Plan of Care Review  Outcome: Progressing  Goal: Patient-Specific Goal (Individualized)  Outcome: Progressing  Goal: Absence of Hospital-Acquired Illness or Injury  Outcome: Progressing  Goal: Optimal Comfort and Wellbeing  Outcome: Progressing  Goal: Readiness for Transition of Care  Outcome: Progressing     Problem: Fall Injury Risk  Goal: Absence of Fall and Fall-Related Injury  Outcome: Progressing     Problem: Skin Injury Risk Increased  Goal: Skin Health and Integrity  Outcome: Progressing     Problem: Acute Kidney Injury/Impairment  Goal: Fluid and Electrolyte Balance  Outcome: Progressing  Goal: Improved Oral Intake  Outcome: Progressing  Goal: Effective Renal Function  Outcome: Progressing

## 2025-02-23 NOTE — PLAN OF CARE
02/22/25 1909   Post-Acute Status   Post-Acute Authorization Home Health   Home Health Status Pending medical clearance/testing   Discharge Delays None known at this time   Discharge Plan   Discharge Plan A Home with family;Home Health   Discharge Plan B Home Health     Discharge Plan A and Plan B have been determined by review of patient's clinical status, future medical and therapeutic needs, and coverage/benefits for post-acute care in coordination with multidisciplinary team members.       Irais Tabor LMSW  Case Management  Emergency Department  [phone]

## 2025-02-23 NOTE — CARE UPDATE
"RAPID RESPONSE NURSE CHART REVIEW        Chart Reviewed: 02/23/2025, 6:56 AM    MRN: [id number]  Bed: 22290/40570 A    Dx: Syncope    Gloria Garcia has a past medical history of Asthma, COPD exacerbation, and Hypertension.    Last VS: BP (!) 209/118 (BP Location: Left arm, Patient Position: Lying)   Pulse 98   Temp 98.3 °F (36.8 °C) (Oral)   Resp (!) 22   Ht 5' 4" (1.626 m)   Wt 46.7 kg (103 lb)   SpO2 100%   BMI 17.68 kg/m²     24H Vital Sign Range:  Temp:  [97.8 °F (36.6 °C)-98.9 °F (37.2 °C)]   Pulse:  [55-98]   Resp:  [14-24]   BP: (139-209)/()   SpO2:  [95 %-100 %]     Level of Consciousness (AVPU): alert    Recent Labs     02/22/25  1720 02/22/25  1907 02/23/25  0422   WBC 5.04  --  4.65   HGB 10.3*  --  10.0*   HCT 33.7* 30* 33.0*     --  246       Recent Labs     02/22/25  1720 02/23/25  0419    138   K 3.0* 4.7   * 106   CO2 19* 25   BUN 17 16   CREATININE 0.7 1.0    80   PHOS  --  2.5*   MG 1.3* 2.6          MEWS score: 1    Rounding completed at 0931.    Rounding completed with charge ROGELIO Owens reports electrolytes corrected with repletion, hypertensive this morning, plan for administration of oral antihypertensives. No acute concerns verbalized at this time. Instructed to call [phone] for further concerns or assistance.    Ariadne Holt RN      "

## 2025-02-23 NOTE — ASSESSMENT & PLAN NOTE
- likely due to dehydration and electrolyte abnormalities, UTI likely contributing   - will check CTH given patient is a poor historian  - CXR, UA pending  - s/p 500cc IVFs  - echo ordered  - replace lytes  - monitor tele  - neuro checks q4hr

## 2025-02-23 NOTE — HPI
Gloria Garcia is a 89 y.o. female with a PMHx of HTN, dementia, COPD, constipation and asthma who presents to [facility] for evaluation of syncope. Patient is a poor historian. She reports suddenly passing out while sitting down watching TV earlier today. She's unsure how long she was out for. Denies any fall or head trauma, though unreliable historian. She complained of dizziness and weakness to ED provider, however she denies these symptoms at the time of my exam. Denies any preceding lightheadedness, chest pain, or SOB earlier today. She's unsure if she took her home meds this AM. She is otherwise feeling well without any complaints of chest pain, LE swelling, headache, weakness, or SOB.    ED: hypertensive to /95, vitals otherwise stable. No leukocytosis. K 3.0, Mag 1.3, Ca 6.0 (corrected 7.3). UA infectious. Given 500cc IVFs, IV Ca gluconate, IV mag, and PO klyte

## 2025-02-23 NOTE — ASSESSMENT & PLAN NOTE
- Ca 6.0, corrected for albumin is 7.3  - given 1g IV Ca gluconate in the ED  - further replacement as indicated  - trend daily labs  2/23  calcium improved to 8.8  after given 1g IV Ca gluconate in the ED.

## 2025-02-23 NOTE — ASSESSMENT & PLAN NOTE
- uncontrolled BP in the setting of questionable med compliance  - home benazepril not on formulary-- will sub w/ lisinopril 20mg daily with first dose tonight given unsure if pt took home meds today  - continue home amlodipine 10mg daily  2/23  SBP 200s.   Chronic, controlled.  Latest blood pressure and vitals reviewed-   Temp:  [97.8 °F (36.6 °C)-98.9 °F (37.2 °C)]   Pulse:  [55-98]   Resp:  [14-24]   BP: (139-209)/()   SpO2:  [95 %-100 %] .   Home meds for hypertension were reviewed and noted below. Hospital anti-hypertensive changes were made as shown below.  Hypertension Medications              [id number]  Lisinoprol 20mg  Take 1 capsule by mouth every morning.  Take 1 capsule by mouth every morning.          Hydralazine - PRN meds if BP> 170/110 mm HG    Pt's son: Ehsan Chamberlain [phone]

## 2025-02-23 NOTE — ASSESSMENT & PLAN NOTE
Patient has Abnormal Magnesium: hypomagnesemia. Will continue to monitor electrolytes closely. Will replace the affected electrolytes and repeat labs to be done after interventions completed. The patient's magnesium results have been reviewed and are listed below.  Recent Labs   Lab 02/22/25  1720   MG 1.3*

## 2025-02-23 NOTE — PROGRESS NOTES
[facility] Medicine  Progress Note    Patient Name: Gloria Garcia  MRN: [id number]  Patient Class: IP- Inpatient   Admission Date: 2/22/2025  Length of Stay: 1 days  Attending Physician: Richar Truong MD  Primary Care Provider: Hailey, Primary Doctor        Subjective     Principal Problem:Syncope        HPI:  Gloria Garcia is a 89 y.o. female with a PMHx of HTN, dementia, COPD, constipation and asthma who presents to [facility] for evaluation of syncope. Patient is a poor historian. She reports suddenly passing out while sitting down watching TV earlier today. She's unsure how long she was out for. Denies any fall or head trauma, though unreliable historian. She complained of dizziness and weakness to ED provider, however she denies these symptoms at the time of my exam. Denies any preceding lightheadedness, chest pain, or SOB earlier today. She's unsure if she took her home meds this AM. She is otherwise feeling well without any complaints of chest pain, LE swelling, headache, weakness, or SOB.    ED: hypertensive to /95, vitals otherwise stable. No leukocytosis. K 3.0, Mag 1.3, Ca 6.0 (corrected 7.3). UA infectious. Given 500cc IVFs, IV Ca gluconate, IV mag, and PO klyte    Overview/Hospital Course:  2/23 AAOX2. denies symptoms. Syncope - likely due to dehydration and electrolyte abnormalities. CTHead -  No acute intracranial abnormality detected.  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 CXR WNL , UA+, UC pending. continue IV ceftriaxone.  s/p 500cc IVFs.  echo ordered. neuro checks q4hr. calcium improved to 8.8  after given 1g IV Ca gluconate in the ED. SBP 200s improved to 140s Cr 0.7--> 1. monitor. CPK levels WNL . continue telemetry . PT/OT consulted         Review of Systems:   Pain scale:    Constitutional:  fever,  chills, headache, vision loss, hearing loss, weight loss, Generalized weakness, falls, loss of smell, loss of  taste, poor appetite,  sore throat  Respiratory: cough, shortness of breath.   Cardiovascular: chest pain, dizziness, palpitations, orthopnea, swelling of feet, syncope  Gastrointestinal: nausea, vomiting, abdominal pain, diarrhea, black stool,  blood in stool, change in bowel habits, constipation  Genitourinary: hematuria, dysuria, urgency, frequency  Integument/Breast: rash,  pruritis  Hematologic/Lymphatic: easy bruising, lymphadenopathy  Musculoskeletal: arthralgias , myalgias, back pain, neck pain, knee pain  Neurological: confusion, seizures, tremors, slurred speech  Behavioral/Psych:  depression, anxiety, auditory or visual hallucinations     OBJECTIVE:     Physical Exam:  Body mass index is 17.68 kg/m².    Constitutional: Appears thin built and frail  Head: Normocephalic and atraumatic.   Neck: Normal range of motion. Neck supple.   Cardiovascular: Normal heart rate.  Regular heart rhythm.  Pulmonary/Chest: Effort normal.   Abdominal: No distension.  No tenderness  Musculoskeletal: Normal range of motion. No edema.   Neurological: Alert and oriented to person, place. able to move bilateral upper and lower extremities without limitation . poor historian  Skin: Skin is warm and dry.   Psychiatric: Normal mood and affect. Behavior is normal.                  Vital Signs  Temp: 98.4 °F (36.9 °C) (02/23/25 0715)  Pulse: 69 (02/23/25 1129)  Resp: (!) 21 (02/23/25 1049)  BP: (!) 141/65 (02/23/25 1049)  SpO2: 95 % (02/23/25 1129)     24 Hour VS Range    Temp:  [97.8 °F (36.6 °C)-98.9 °F (37.2 °C)]   Pulse:  [55-98]   Resp:  [14-24]   BP: (139-209)/()   SpO2:  [94 %-100 %]     Intake/Output Summary (Last 24 hours) at 2/23/2025 1255  Last data filed at 2/22/2025 1831  Gross per 24 hour   Intake 500 ml   Output --   Net 500 ml         I/O This Shift:  No intake/output data recorded.    Wt Readings from Last 3 Encounters:   02/23/25 46.7 kg (103 lb)   02/18/25 39.3 kg (86 lb 10.3 oz)   11/20/24 43.2 kg (95 lb 3.8  "oz)       I have personally reviewed the vitals and recorded Intake/Output     Laboratory/Diagnostic Data:    CBC/Anemia Labs: Coags:    Recent Labs   Lab 02/22/25  1720 02/22/25  1907 02/23/25  0422   WBC 5.04  --  4.65   HGB 10.3*  --  10.0*   HCT 33.7* 30* 33.0*     --  246   MCV 89  --  88   RDW 13.2  --  13.3    No results for input(s): "PT", "INR", "APTT" in the last 168 hours.     Chemistries: ABG:   Recent Labs   Lab 02/22/25  1720 02/23/25  0419    138   K 3.0* 4.7   * 106   CO2 19* 25   BUN 17 16   CREATININE 0.7 1.0   CALCIUM 6.0* 8.8   PROT 4.6*  --    BILITOT 0.3  --    ALKPHOS 43  --    ALT <5*  --    AST 23  --    MG 1.3* 2.6   PHOS  --  2.5*    No results for input(s): "PH", "PCO2", "PO2", "HCO3", "POCSATURATED", "BE" in the last 168 hours.     POCT Glucose: HbA1c:    Recent Labs   Lab 02/23/25 0417 02/23/25  0851   POCTGLUCOSE 84 79    Hemoglobin A1C   Date Value Ref Range Status   02/23/2025 5.2 4.0 - 5.6 % Final     Comment:     ADA Screening Guidelines:  5.7-6.4%  Consistent with prediabetes  >or=6.5%  Consistent with diabetes    High levels of fetal hemoglobin interfere with the HbA1C  assay. Heterozygous hemoglobin variants (HbS, HgC, etc)do  not significantly interfere with this assay.   However, presence of multiple variants may affect accuracy.     03/21/2024 5.4 4.7 - 5.6 % Final        Cardiac Enzymes: Ejection Fractions:    Recent Labs     02/23/25  0419   CPK 71    No results found for: "EF"       Recent Labs   Lab 02/22/25 2008   COLORU Yellow   APPEARANCEUA Hazy*   PHUR 6.0   SPECGRAV 1.025   PROTEINUA Trace*   GLUCUA Negative   KETONESU Negative   BILIRUBINUA Negative   OCCULTUA Trace*   NITRITE Negative   LEUKOCYTESUR 1+*   RBCUA 11*   WBCUA 17*   BACTERIA Many*   SQUAMEPITHEL 1       Procalcitonin (ng/mL)   Date Value   03/28/2020 0.05     Lactate (Lactic Acid) (mmol/L)   Date Value   03/28/2020 0.7     BNP (pg/mL)   Date Value   02/22/2025 83   11/20/2024 107 " (H)   10/06/2024 80   07/09/2024 49   04/20/2024 31     CRP (mg/L)   Date Value   03/28/2020 40.3 (H)     Sed Rate (mm/Hr)   Date Value   03/28/2020 36 (H)     D-Dimer (mg/L FEU)   Date Value   10/06/2024 0.81 (H)     Ferritin (ng/mL)   Date Value   03/28/2020 22     LD (U/L)   Date Value   03/28/2020 244     Troponin I (ng/mL)   Date Value   11/20/2024 0.019   10/06/2024 0.008   07/09/2024 0.006   04/20/2024 0.011   08/02/2022 0.007   03/30/2020 0.020   03/29/2020 0.019   03/28/2020 0.058 (H)     CPK (U/L)   Date Value   02/23/2025 71   03/28/2020 87     No results found for this or any previous visit.  SARS-CoV2 (COVID-19) Qualitative PCR (no units)   Date Value   03/29/2020 Undetected     SARS-CoV-2 RNA, Amplification, Qual (no units)   Date Value   11/21/2024 Negative   07/09/2024 Negative     POC Rapid COVID (no units)   Date Value   11/10/2024 Negative   10/06/2024 Negative   08/02/2022 Negative       Microbiology labs for the last week  Microbiology Results (last 7 days)       Procedure Component Value Units Date/Time    Urine culture [[id number]] Collected: 02/22/25 2008    Order Status: No result Specimen: Urine Updated: 02/22/25 2054            Reviewed and noted in plan where applicable- Please see chart for full lab data.    Lines/Drains:       Peripheral IV - Single Lumen 02/22/25 18 G Left Antecubital (Active)   Number of days: 1       Imaging  ECG Results              EKG 12-lead (Final result)        Collection Time Result Time QRS Duration OHS QTC Calculation    02/22/25 16:01:57 02/23/25 09:32:54 68 466                     Final result by Interface, Lab In [facility] (02/23/25 09:33:01)                   Narrative:    Test Reason : R55,    Vent. Rate :  60 BPM     Atrial Rate :  60 BPM     P-R Int : 170 ms          QRS Dur :  68 ms      QT Int : 466 ms       P-R-T Axes : -28   1  33 degrees    QTcB Int : 466 ms    Sinus rhythm with Premature supraventricular complexes  Minimal voltage criteria for  LVH, may be normal variant ( Sokolow-Piper )  Low septal forces ; Abnormal R wave progression in the precordial leads  Nonspecific T wave abnormality  Prolonged QT  Abnormal ECG  When compared with ECG of 20-Nov-2024 15:36,  Vent. rate has decreased by  36 bpm  ST elevation now present in Anterior leads  Confirmed by Steve Granados ([id number]) on 2/23/2025 9:32:52 AM    Referred By: AAAREFERRAL SELF           Confirmed By: Steve Granados                                    No results found for this or any previous visit.      CT Head Without Contrast  Narrative: EXAMINATION:  CT OF THE HEAD WITHOUT    CLINICAL HISTORY:  Syncope, simple, abnormal neuro exam;    TECHNIQUE:  5 mm unenhanced axial images were obtained from the skull base to the vertex.    COMPARISON:  None.    FINDINGS:  Examination is limited by motion artifact.  Moderate cerebral atrophy and chronic small vessel ischemic changes are present.  Encephalomalacia changes are seen in the left frontal and left occipital lobes.  There is no acute intracranial hemorrhage, territorial infarct or mass effect, or midline shift. In the visualized paranasal sinuses and mastoid air cells, there is a small mucous retention cyst or polyp in the right sphenoid sinus and mucoperiosteal thickening in the left frontal sinus.  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  Impression: No acute intracranial abnormality detected.  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    Electronically signed by: Radha Womack  Date:    02/22/2025  Time:    23:53  X-Ray Chest AP Portable  Narrative: EXAMINATION:  XR CHEST AP PORTABLE    CLINICAL HISTORY:  Syncope;    TECHNIQUE:  Single frontal view of the chest was performed.    COMPARISON:  11/20/2024.    FINDINGS:  There are skin folds overlying the right lung.  The lungs are well expanded and clear.  No focal opacities are seen.  The pleural spaces are clear the cardiac silhouette is unremarkable.  There are  calcifications of the aortic arch.  Osseous structures demonstrate degenerative changes.  Impression: No acute abnormality.    Electronically signed by: Javier Kraus  Date:    02/22/2025  Time:    19:10      Labs, Imaging, EKG and Diagnostic results from 2/23/2025 were reviewed.    Medications:  Medication list was reviewed and changes noted under Assessment/Plan.  Medications Ordered Prior to Encounter[1]  Scheduled Medications:  Current Facility-Administered Medications   Medication Dose Route Frequency    amLODIPine  10 mg Oral Daily    cefTRIAXone (Rocephin) IV (PEDS and ADULTS)  1 g Intravenous Q24H    heparin (porcine)  5,000 Units Subcutaneous Q12H    lisinopriL  20 mg Oral Daily     PRN:   Current Facility-Administered Medications:     acetaminophen, 650 mg, Oral, Q4H PRN    albuterol-ipratropium, 3 mL, Nebulization, Q4H PRN    dextrose 50%, 12.5 g, Intravenous, PRN    dextrose 50%, 25 g, Intravenous, PRN    glucagon (human recombinant), 1 mg, Intramuscular, PRN    glucose, 16 g, Oral, PRN    glucose, 24 g, Oral, PRN    hydrALAZINE, 25 mg, Oral, Q8H PRN    melatonin, 6 mg, Oral, Nightly PRN    polyethylene glycol, 17 g, Oral, Daily PRN    sodium chloride 0.9%, 10 mL, Intravenous, PRN  Infusions:   Estimated Creatinine Clearance: 29.2 mL/min (based on SCr of 1 mg/dL).             Assessment and Plan     * Syncope  - likely due to dehydration and electrolyte abnormalities, UTI likely contributing   - will check CTH given patient is a poor historian  - CXR, UA pending  - s/p 500cc IVFs  - echo ordered  - replace lytes  - monitor tele  - neuro checks q4hr  2/23 Syncope - likely due to dehydration and electrolyte abnormalities. CTHead -  No acute intracranial abnormality detected.  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 CXR WNL , UA+, UC pending. continue IV ceftriaxone.  s/p 500cc IVFs.  echo orderedneuro checks q4hr. calcium improved to 8.8  after given 1g IV Ca  gluconate in the ED. continue telemetry     Anemia    Patient's with Normocytic anemia.. Hemoglobin stable. Etiology likely due to chronic disease .  Current CBC reviewed-    Recent Labs   Lab 02/22/25  1720 02/23/25  0422   HGB 10.3* 10.0*         Component Value Date/Time    MCV 88 02/23/2025 0422    RDW 13.3 02/23/2025 0422    FERRITIN 22 03/28/2020 2325     Monitor CBC and transfuse if H/H drops below 7/21.      Hypocalcemia  - Ca 6.0, corrected for albumin is 7.3  - given 1g IV Ca gluconate in the ED  - further replacement as indicated  - trend daily labs  2/23  calcium improved to 8.8  after given 1g IV Ca gluconate in the ED.    Acute cystitis without hematuria  - afebrile without leukocytosis  - continue IV rocephin 1g q24hrs  - follow urine cx    Hypomagnesemia  Patient has Abnormal Magnesium: hypomagnesemia. Will continue to monitor electrolytes closely. Will replace the affected electrolytes and repeat labs to be done after interventions completed. The patient's magnesium results have been reviewed and are listed below.  Recent Labs   Lab 02/23/25  0419   MG 2.6          Hypokalemia  - K 3.0  - replace PO  - monitor daily labs  - tele   2/23 resolved     Asthma-COPD overlap syndrome  - no acute issues  - satting 97% on RA  - duonebs PRN    Essential hypertension  - uncontrolled BP in the setting of questionable med compliance  - home benazepril not on formulary-- will sub w/ lisinopril 20mg daily with first dose tonight given unsure if pt took home meds today  - continue home amlodipine 10mg daily  2/23  SBP 200s.   Chronic, controlled.  Latest blood pressure and vitals reviewed-   Temp:  [97.8 °F (36.6 °C)-98.9 °F (37.2 °C)]   Pulse:  [55-98]   Resp:  [14-24]   BP: (139-209)/()   SpO2:  [95 %-100 %] .   Home meds for hypertension were reviewed and noted below. Hospital anti-hypertensive changes were made as shown below.  Hypertension Medications              [id number]  Lisinoprol 20mg  Take 1  capsule by mouth every morning.  Take 1 capsule by mouth every morning.          Hydralazine - PRN meds if BP> 170/110 mm HG     ROD (acute kidney injury)  ROD is likely due to pre-renal azotemia due to intravascular volume depletion. Baseline creatinine is  0.7 . Most recent creatinine and eGFR are listed below.  Recent Labs     02/22/25  1720 02/23/25  0419   CREATININE 0.7 1.0   EGFRNORACEVR >60.0 54.5*      Plan    - Avoid nephrotoxins and renally dose meds for GFR listed above  - Monitor urine output, serial BMP, and adjust therapy as needed  - renal sonogram and CPK levels   monitor on lisinopril       VTE Risk Mitigation (From admission, onward)           Ordered     heparin (porcine) injection 5,000 Units  Every 12 hours         02/22/25 1913     IP VTE HIGH RISK PATIENT  Once         02/22/25 1902     IP VTE HIGH RISK PATIENT  Once         02/22/25 1902                    Discharge Planning   DOTTIE: 2/25/2025     Code Status: Full Code   Medical Readiness for Discharge Date:   Discharge Plan A: Home with family, Home Health   Discharge Delays: None known at this time                    Richar Truong MD  Department of Hospital Medicine   [facility] - Acute Medical Stepdown         [1]   No current facility-administered medications on file prior to encounter.     Current Outpatient Medications on File Prior to Encounter   Medication Sig Dispense Refill    albuterol (PROVENTIL/VENTOLIN HFA) 90 mcg/actuation inhaler Inhale 2 puffs into the lungs every 6 (six) hours as needed. Rescue 18 g 5    albuterol-ipratropium (DUO-NEB) 2.5 mg-0.5 mg/3 mL nebulizer solution use 1 vial by nebulization every 4 (four) hours as needed for Wheezing or Shortness of Breath. Rescue 90 mL 2    amlodipine-benazepril 10-20mg (LOTREL) 10-20 mg per capsule Take 1 capsule by mouth every morning.      ARICEPT 5 mg tablet Take 5 mg by mouth every evening.      atorvastatin (LIPITOR) 10 MG tablet Take 10 mg by mouth once daily.       benzonatate (TESSALON) 100 MG capsule Take 200 mg by mouth.      budesonide-glycopyr-formoterol (BREZTRI AEROSPHERE) 160-9-4.8 mcg/actuation HFAA Inhale 2 puffs into the lungs 2 (two) times a day. 10.7 g 5    doxycycline (MONODOX) 100 MG capsule Take 100 mg by mouth 2 (two) times daily.      promethazine (PHENERGAN) 6.25 mg/5 mL syrup Take 5 mLs by mouth 2 (two) times daily as needed.

## 2025-02-23 NOTE — ASSESSMENT & PLAN NOTE
- uncontrolled BP in the setting of questionable med compliance  - home benazepril not on formulary-- will sub w/ lisinopril 20mg daily with first dose tonight given unsure if pt took home meds today  - continue home amlodipine 10mg daily

## 2025-02-23 NOTE — PLAN OF CARE
[facility] - Emergency Dept  Initial Discharge Assessment       Primary Care Provider: Hailey, Primary Doctor    Admission Diagnosis: No admission diagnoses are documented for this encounter.    Admission Date: 2/22/2025  Expected Discharge Date:     Transition of Care Barriers: (P) None    Payor: MEDICARE / Plan: MEDICARE PART A & B / Product Type: Government /     Extended Emergency Contact Information  Primary Emergency Contact: Meseret Christensen  Mobile Phone: [phone]  Relation: Relative  Secondary Emergency Contact: Lynn Christensen  Mobile Phone: [phone]  Relation: Relative    Discharge Plan A: (P) Home with family  Discharge Plan B: (P) Home with family      [id number] [place], [place] - [address] AT [address]  [address]  [facility] [address]  Phone: [phone] Fax: [phone]    [id number] [place], [place] - [address]  [address]  [facility] [address]  Phone: [phone] Fax: [phone]      Initial Assessment (most recent)       Adult Discharge Assessment - 02/22/25 1851          Discharge Assessment    Assessment Type Discharge Planning Assessment     Confirmed/corrected address, phone number and insurance Yes     Confirmed Demographics Correct on Facesheet     Source of Information patient     If unable to respond/provide information was family/caregiver contacted? Yes     Contact Name/Number Meseret   [phone]     Does patient/caregiver understand observation status Yes     Communicated DOTTIE with patient/caregiver Date not available/Unable to determine     People in Home child(chase), adult     Do you expect to return to your current living situation? Yes     Do you have help at home or someone to help you manage your care at home? No     Prior to hospitilization cognitive status: Unable to Assess     Current cognitive status: Alert/Oriented      Walking or Climbing Stairs Difficulty yes     Walking or Climbing Stairs ambulation difficulty, requires equipment     Dressing/Bathing Difficulty no     Home Accessibility wheelchair accessible     Equipment Currently Used at Home wheelchair;walker, standard     Readmission within 30 days? Yes     Patient currently being followed by outpatient case management? No     Do you currently have service(s) that help you manage your care at home? No     Do you take prescription medications? Yes (P)      Do you have prescription coverage? Yes (P)      Do you have any problems affording any of your prescribed medications? No (P)      Is the patient taking medications as prescribed? yes (P)      How do you get to doctors appointments? health plan transportation (P)      Are you on dialysis? No (P)      Do you take coumadin? No (P)      Discharge Plan A Home with family (P)      Discharge Plan B Home with family (P)      DME Needed Upon Discharge  none (P)      Discharge Plan discussed with: Patient (P)      Transition of Care Barriers None (P)         Physical Activity    On average, how many days per week do you engage in moderate to strenuous exercise (like a brisk walk)? 5 days (P)      On average, how many minutes do you engage in exercise at this level? 20 min (P)         Financial Resource Strain    How hard is it for you to pay for the very basics like food, housing, medical care, and heating? Not very hard (P)         Housing Stability    In the last 12 months, was there a time when you were not able to pay the mortgage or rent on time? No (P)      At any time in the past 12 months, were you homeless or living in a shelter (including now)? No (P)         Transportation Needs    Has the lack of transportation kept you from medical appointments, meetings, work or from getting things needed for daily living? No (P)         Food Insecurity    Within the past 12 months, you worried that your food would run out before you  got the money to buy more. Never true (P)      Within the past 12 months, the food you bought just didn't last and you didn't have money to get more. Never true (P)         Stress    Do you feel stress - tense, restless, nervous, or anxious, or unable to sleep at night because your mind is troubled all the time - these days? Not at all (P)         Social Isolation    How often do you feel lonely or isolated from those around you?  Never (P)         Alcohol Use    Q1: How often do you have a drink containing alcohol? Never (P)      Q2: How many drinks containing alcohol do you have on a typical day when you are drinking? Patient does not drink (P)      Q3: How often do you have six or more drinks on one occasion? Never (P)         Utilities    In the past 12 months has the electric, gas, oil, or water company threatened to shut off services in your home? No (P)         Health Literacy    How often do you need to have someone help you when you read instructions, pamphlets, or other written material from your doctor or pharmacy? Never (P)                         Donny reached out to pt's niece Meseret  [phone] who stated she's pt's care giver. Meseret stated pt does not use any mobility equipment and pt independently completes her adl's. Meseret stated she is very agreeable with home health services at discharge.       Irais Tabor, [facility]  Case Management  Emergency Department  [phone]

## 2025-02-23 NOTE — ASSESSMENT & PLAN NOTE
- Ca 6.0, corrected for albumin is 7.3  - given 1g IV Ca gluconate in the ED  - further replacement as indicated  - trend daily labs

## 2025-02-23 NOTE — ED NOTES
Telemetry Verification   Patient placed on Telemetry Box  Verified with War Room  Box # [id number]   Monitor Tech    Rate 80   Rhythm NSR

## 2025-02-23 NOTE — ASSESSMENT & PLAN NOTE
- likely due to dehydration and electrolyte abnormalities, UTI likely contributing   - will check CTH given patient is a poor historian  - CXR, UA pending  - s/p 500cc IVFs  - echo ordered  - replace lytes  - monitor tele  - neuro checks q4hr  2/23 Syncope - likely due to dehydration and electrolyte abnormalities. CTHead -  No acute intracranial abnormality detected.  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 CXR WNL , UA+, UC pending. continue IV ceftriaxone.  s/p 500cc IVFs.  echo orderedneuro checks q4hr. calcium improved to 8.8  after given 1g IV Ca gluconate in the ED. continue telemetry    no fever and no chills.

## 2025-02-23 NOTE — SUBJECTIVE & OBJECTIVE
Past Medical History:   Diagnosis Date    Asthma     COPD exacerbation 2024    Hypertension        Past Surgical History:   Procedure Laterality Date    HYSTERECTOMY         Review of patient's allergies indicates:  No Known Allergies    No current facility-administered medications on file prior to encounter.     Current Outpatient Medications on File Prior to Encounter   Medication Sig    albuterol (PROVENTIL/VENTOLIN HFA) 90 mcg/actuation inhaler Inhale 2 puffs into the lungs every 6 (six) hours as needed. Rescue    albuterol-ipratropium (DUO-NEB) 2.5 mg-0.5 mg/3 mL nebulizer solution use 1 vial by nebulization every 4 (four) hours as needed for Wheezing or Shortness of Breath. Rescue    amlodipine-benazepril 10-20mg (LOTREL) 10-20 mg per capsule Take 1 capsule by mouth every morning.    ARICEPT 5 mg tablet Take 5 mg by mouth every evening.    atorvastatin (LIPITOR) 10 MG tablet Take 10 mg by mouth once daily.    benzonatate (TESSALON) 100 MG capsule Take 200 mg by mouth.    budesonide-glycopyr-formoterol (BREZTRI AEROSPHERE) 160-9-4.8 mcg/actuation HFAA Inhale 2 puffs into the lungs 2 (two) times a day.    doxycycline (MONODOX) 100 MG capsule Take 100 mg by mouth 2 (two) times daily.    promethazine (PHENERGAN) 6.25 mg/5 mL syrup Take 5 mLs by mouth 2 (two) times daily as needed.     Family History       Problem Relation (Age of Onset)    Hypertension Mother    Stroke Father          Tobacco Use    Smoking status: Former     Current packs/day: 0.00     Types: Cigarettes     Quit date: 3/28/1995     Years since quittin.9    Smokeless tobacco: Never   Substance and Sexual Activity    Alcohol use: Yes     Comment: occasional/social    Drug use: Never    Sexual activity: Not Currently     Review of Systems   Unable to perform ROS: Dementia     Objective:     Vital Signs (Most Recent):  Temp: 98.5 °F (36.9 °C) (25)  Pulse: 64 (25)  Resp: 14 (25)  BP: (!) 201/87 (25  )  SpO2: 96 % (25) Vital Signs (24h Range):  Temp:  [97.8 °F (36.6 °C)-98.5 °F (36.9 °C)] 98.5 °F (36.9 °C)  Pulse:  [61-67] 64  Resp:  [14-24] 14  SpO2:  [95 %-97 %] 96 %  BP: (166-205)/(83-95) 201/87     Weight: 39 kg (86 lb)  Body mass index is 14.76 kg/m².     Physical Exam  Vitals and nursing note reviewed.   Constitutional:       General: She is not in acute distress.     Appearance: She is well-developed.      Comments: Underweight appearing   HENT:      Head: Normocephalic and atraumatic.      Mouth/Throat:      Pharynx: No oropharyngeal exudate.   Eyes:      Conjunctiva/sclera: Conjunctivae normal.   Cardiovascular:      Rate and Rhythm: Normal rate and regular rhythm.      Heart sounds: Normal heart sounds.   Pulmonary:      Effort: Pulmonary effort is normal. No respiratory distress.      Breath sounds: Normal breath sounds. No wheezing.   Abdominal:      General: Bowel sounds are normal. There is no distension.      Palpations: Abdomen is soft.      Tenderness: There is no abdominal tenderness.   Musculoskeletal:         General: No tenderness. Normal range of motion.      Cervical back: Normal range of motion and neck supple.      Right lower leg: No edema.      Left lower leg: No edema.   Lymphadenopathy:      Cervical: No cervical adenopathy.   Skin:     General: Skin is warm and dry.      Capillary Refill: Capillary refill takes less than 2 seconds.      Findings: No rash.   Neurological:      General: No focal deficit present.      Mental Status: She is alert.      Cranial Nerves: No cranial nerve deficit.      Sensory: No sensory deficit.      Coordination: Coordination normal.      Comments: Oriented to person and , disoriented to date. Poor historian. 5/5 strength bilat upper & lower extremities   Psychiatric:         Behavior: Behavior normal.         Thought Content: Thought content normal.         Judgment: Judgment normal.                Significant Labs: All pertinent labs  within the past 24 hours have been reviewed.  CBC:   Recent Labs   Lab 02/22/25  1720 02/22/25  1907   WBC 5.04  --    HGB 10.3*  --    HCT 33.7* 30*     --      CMP:   Recent Labs   Lab 02/22/25  1720      K 3.0*   *   CO2 19*      BUN 17   CREATININE 0.7   CALCIUM 6.0*   PROT 4.6*   ALBUMIN 2.4*   BILITOT 0.3   ALKPHOS 43   AST 23   ALT <5*   ANIONGAP 4*       Significant Imaging: I have reviewed all pertinent imaging results/findings within the past 24 hours.  X-Ray Chest AP Portable  Narrative: EXAMINATION:  XR CHEST AP PORTABLE    CLINICAL HISTORY:  Syncope;    TECHNIQUE:  Single frontal view of the chest was performed.    COMPARISON:  11/20/2024.    FINDINGS:  There are skin folds overlying the right lung.  The lungs are well expanded and clear.  No focal opacities are seen.  The pleural spaces are clear the cardiac silhouette is unremarkable.  There are calcifications of the aortic arch.  Osseous structures demonstrate degenerative changes.  Impression: No acute abnormality.    Electronically signed by: Javier Kraus  Date:    02/22/2025  Time:    19:10

## 2025-02-23 NOTE — ASSESSMENT & PLAN NOTE
Patient has Abnormal Magnesium: hypomagnesemia. Will continue to monitor electrolytes closely. Will replace the affected electrolytes and repeat labs to be done after interventions completed. The patient's magnesium results have been reviewed and are listed below.  Recent Labs   Lab 02/23/25  0419   MG 2.6

## 2025-02-23 NOTE — HOSPITAL COURSE
2/23 AAOX2. denies symptoms. Syncope - likely due to dehydration and electrolyte abnormalities. CTHead -  No acute intracranial abnormality detected.  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 CXR WNL , UA+, UC pending. continue IV ceftriaxone.  s/p 500cc IVFs.  echo ordered. neuro checks q4hr. calcium improved to 8.8  after given 1g IV Ca gluconate in the ED. SBP 200s improved to 140s Cr 0.7--> 1. monitor. CPK levels WNL . continue telemetry .   2/24 Lisinopril discontinued. renal sonogram - Bilateral medical renal disease.  No hydronephrosis. Simple left renal cyst. started on hydralazine 25mg q 12h  for elevated BP. UC - Multiple organisms isolated. None in predominance.  Ceftriaxone discontinued. DVT sonogram - No evidence of deep venous thrombosis in either lower extremity and D dimer 0.73. Echo -  Left Ventricle: The left ventricle is normal in size. Ventricular mass is normal. Normal wall thickness. Regional wall motion abnormalities present. See diagram for wall motion findings. Septal motion is abnormal. There is normal systolic function with a visually estimated ejection fraction of 60 - 65%. Ejection fraction is approximately 63%. Quantitated ejection fraction is 64%. There is indeterminate diastolic function.    Right Ventricle: Normal right ventricular cavity size. Wall thickness is normal. Systolic function is normal.    Left Atrium: Normal left atrial size.    Right Atrium: Normal right atrial size.    Aortic Valve: There is mild aortic valve sclerosis. Aortic valve peak velocity is 1.5 m/s. Mean gradient is 4 mmHg.    Mitral Valve: There is bileaflet sclerosis. There is normal leaflet mobility.    Tricuspid Valve: The tricuspid valve is structurally normal. There is mild regurgitation.    Pulmonic Valve: The pulmonic valve is structurally normal. There is mild regurgitation.    Pulmonary Artery: There is mild to moderate pulmonary hypertension. The estimated  pulmonary artery systolic pressure is 49 mmHg.    IVC/SVC: Normal venous pressure at 3 mmHg.    Pericardium: There is no pericardial effusion. orthostatics negative.   PT/OT consulted - recs SNF.   2/25 hydralazine decreased to 10mg QID. amlodipine 10mg switched to HS. EKG with sinus arrhythmia. Cr improved to 1.2. discussed with MITZI Miss Bailey ([phone]) needs SNF referrals   2/26 orthostatic hypotension today. amlodipine discontinued. Gentle LR hydration. hydralazine 10mg q8h PRN for SBP > 160mm HG. pending SNF    Continue to hold amlodipine, hydralazine prn ordered for elevated BP. Would be okay with higher BP while supine to avoid orthostatics    Patient stable for discharge to SNF, discharge in stable condition

## 2025-02-23 NOTE — ASSESSMENT & PLAN NOTE
Patient's with Normocytic anemia.. Hemoglobin stable. Etiology likely due to chronic disease .  Current CBC reviewed-    Recent Labs   Lab 02/22/25  1720 02/23/25  0422   HGB 10.3* 10.0*         Component Value Date/Time    MCV 88 02/23/2025 0422    RDW 13.3 02/23/2025 0422    FERRITIN 22 03/28/2020 2325     Monitor CBC and transfuse if H/H drops below 7/21.

## 2025-02-24 LAB
ANION GAP SERPL CALC-SCNC: 10 MMOL/L (ref 8–16)
ASCENDING AORTA: 3.19 CM
AV AREA BY CONTINUOUS VTI: 2.1 CM2
AV INDEX (PROSTH): 0.67
AV LVOT MEAN GRADIENT: 2 MMHG
AV LVOT PEAK GRADIENT: 4 MMHG
AV MEAN GRADIENT: 4 MMHG
AV PEAK GRADIENT: 9 MMHG
AV VALVE AREA BY VELOCITY RATIO: 2.1 CM²
AV VALVE AREA: 2.1 CM2
AV VELOCITY RATIO: 0.67
BACTERIA UR CULT: NORMAL
BACTERIA UR CULT: NORMAL
BASOPHILS # BLD AUTO: 0.04 K/UL (ref 0–0.2)
BASOPHILS NFR BLD: 0.9 % (ref 0–1.9)
BSA FOR ECHO PROCEDURE: 1.44 M2
BUN SERPL-MCNC: 17 MG/DL (ref 8–23)
CALCIUM SERPL-MCNC: 8.8 MG/DL (ref 8.7–10.5)
CHLORIDE SERPL-SCNC: 105 MMOL/L (ref 95–110)
CO2 SERPL-SCNC: 23 MMOL/L (ref 23–29)
CREAT SERPL-MCNC: 1 MG/DL (ref 0.5–1.4)
CV ECHO LV RWT: 0.41 CM
D DIMER PPP IA.FEU-MCNC: 0.73 MG/L FEU
DIFFERENTIAL METHOD BLD: ABNORMAL
DOP CALC AO PEAK VEL: 1.5 M/S
DOP CALC AO VTI: 32.9 CM
DOP CALC LVOT AREA: 3.1 CM2
DOP CALC LVOT DIAMETER: 2 CM
DOP CALC LVOT PEAK VEL: 1 M/S
DOP CALC LVOT STROKE VOLUME: 68.8 CM3
DOP CALCLVOT PEAK VEL VTI: 21.9 CM
E WAVE DECELERATION TIME: 318 MS
E/A RATIO: 0.56
E/E' RATIO: 14 M/S
ECHO EF ESTIMATED: 58 %
ECHO LV POSTERIOR WALL: 0.8 CM (ref 0.6–1.1)
EJECTION FRACTION: 63 %
EOSINOPHIL # BLD AUTO: 0.2 K/UL (ref 0–0.5)
EOSINOPHIL NFR BLD: 3.6 % (ref 0–8)
ERYTHROCYTE [DISTWIDTH] IN BLOOD BY AUTOMATED COUNT: 13.4 % (ref 11.5–14.5)
EST. GFR  (NO RACE VARIABLE): 54.5 ML/MIN/1.73 M^2
FRACTIONAL SHORTENING: 38.5 % (ref 28–44)
GLUCOSE SERPL-MCNC: 77 MG/DL (ref 70–110)
HCT VFR BLD AUTO: 34.1 % (ref 37–48.5)
HGB BLD-MCNC: 10.6 G/DL (ref 12–16)
IMM GRANULOCYTES # BLD AUTO: 0.01 K/UL (ref 0–0.04)
IMM GRANULOCYTES NFR BLD AUTO: 0.2 % (ref 0–0.5)
INTERVENTRICULAR SEPTUM: 0.9 CM (ref 0.6–1.1)
LA MAJOR: 4 CM
LA MINOR: 4.2 CM
LA WIDTH: 3.8 CM
LEFT ATRIUM SIZE: 3.1 CM
LEFT ATRIUM VOLUME INDEX MOD: 27 ML/M2
LEFT ATRIUM VOLUME INDEX: 28 ML/M2
LEFT ATRIUM VOLUME MOD: 39 ML
LEFT ATRIUM VOLUME: 41 CM3
LEFT INTERNAL DIMENSION IN SYSTOLE: 2.4 CM (ref 2.1–4)
LEFT VENTRICLE DIASTOLIC VOLUME INDEX: 31.82 ML/M2
LEFT VENTRICLE DIASTOLIC VOLUME: 46.77 ML
LEFT VENTRICLE MASS INDEX: 66.2 G/M2
LEFT VENTRICLE SYSTOLIC VOLUME INDEX: 13.5 ML/M2
LEFT VENTRICLE SYSTOLIC VOLUME: 19.8 ML
LEFT VENTRICULAR INTERNAL DIMENSION IN DIASTOLE: 3.9 CM (ref 3.5–6)
LEFT VENTRICULAR MASS: 97.4 G
LV LATERAL E/E' RATIO: 15.3
LV SEPTAL E/E' RATIO: 12.2
LYMPHOCYTES # BLD AUTO: 1.5 K/UL (ref 1–4.8)
LYMPHOCYTES NFR BLD: 33 % (ref 18–48)
MAGNESIUM SERPL-MCNC: 2.2 MG/DL (ref 1.6–2.6)
MCH RBC QN AUTO: 27.2 PG (ref 27–31)
MCHC RBC AUTO-ENTMCNC: 31.1 G/DL (ref 32–36)
MCV RBC AUTO: 88 FL (ref 82–98)
MONOCYTES # BLD AUTO: 0.5 K/UL (ref 0.3–1)
MONOCYTES NFR BLD: 9.6 % (ref 4–15)
MV PEAK A VEL: 1.09 M/S
MV PEAK E VEL: 0.61 M/S
NEUTROPHILS # BLD AUTO: 2.5 K/UL (ref 1.8–7.7)
NEUTROPHILS NFR BLD: 52.7 % (ref 38–73)
NRBC BLD-RTO: 0 /100 WBC
OHS CV RV/LV RATIO: 0.67 CM
OHS LV EJECTION FRACTION SIMPSONS BIPLANE MOD: 64 %
PHOSPHATE SERPL-MCNC: 3.5 MG/DL (ref 2.7–4.5)
PISA TR MAX VEL: 3.4 M/S
PLATELET # BLD AUTO: 263 K/UL (ref 150–450)
PMV BLD AUTO: 10.6 FL (ref 9.2–12.9)
POCT GLUCOSE: 111 MG/DL (ref 70–110)
POCT GLUCOSE: 83 MG/DL (ref 70–110)
POCT GLUCOSE: 94 MG/DL (ref 70–110)
POCT GLUCOSE: 94 MG/DL (ref 70–110)
POTASSIUM SERPL-SCNC: 4.8 MMOL/L (ref 3.5–5.1)
RA MAJOR: 3.39 CM
RA PRESSURE ESTIMATED: 3 MMHG
RA WIDTH: 3.03 CM
RBC # BLD AUTO: 3.89 M/UL (ref 4–5.4)
RIGHT VENTRICLE DIASTOLIC BASEL DIMENSION: 2.6 CM
RV TB RVSP: 6 MMHG
SINUS: 2.43 CM
SODIUM SERPL-SCNC: 138 MMOL/L (ref 136–145)
STJ: 1.81 CM
TDI LATERAL: 0.04 M/S
TDI SEPTAL: 0.05 M/S
TDI: 0.05 M/S
TR MAX PG: 46 MMHG
TRICUSPID ANNULAR PLANE SYSTOLIC EXCURSION: 2.1 CM
TV PEAK GRADIENT: 47 MMHG
TV REST PULMONARY ARTERY PRESSURE: 49 MMHG
WBC # BLD AUTO: 4.67 K/UL (ref 3.9–12.7)
Z-SCORE OF LEFT VENTRICULAR DIMENSION IN END DIASTOLE: -1.21
Z-SCORE OF LEFT VENTRICULAR DIMENSION IN END SYSTOLE: -1.03

## 2025-02-24 PROCEDURE — 20600001 HC STEP DOWN PRIVATE ROOM

## 2025-02-24 PROCEDURE — 85379 FIBRIN DEGRADATION QUANT: CPT | Performed by: HOSPITALIST

## 2025-02-24 PROCEDURE — 97161 PT EVAL LOW COMPLEX 20 MIN: CPT

## 2025-02-24 PROCEDURE — 80048 BASIC METABOLIC PNL TOTAL CA: CPT | Performed by: PHYSICIAN ASSISTANT

## 2025-02-24 PROCEDURE — 25000003 PHARM REV CODE 250: Performed by: HOSPITALIST

## 2025-02-24 PROCEDURE — 36415 COLL VENOUS BLD VENIPUNCTURE: CPT | Performed by: HOSPITALIST

## 2025-02-24 PROCEDURE — 97530 THERAPEUTIC ACTIVITIES: CPT

## 2025-02-24 PROCEDURE — 85025 COMPLETE CBC W/AUTO DIFF WBC: CPT | Performed by: PHYSICIAN ASSISTANT

## 2025-02-24 PROCEDURE — 84100 ASSAY OF PHOSPHORUS: CPT | Performed by: PHYSICIAN ASSISTANT

## 2025-02-24 PROCEDURE — 36415 COLL VENOUS BLD VENIPUNCTURE: CPT | Performed by: PHYSICIAN ASSISTANT

## 2025-02-24 PROCEDURE — 83735 ASSAY OF MAGNESIUM: CPT | Performed by: PHYSICIAN ASSISTANT

## 2025-02-24 PROCEDURE — 97166 OT EVAL MOD COMPLEX 45 MIN: CPT

## 2025-02-24 PROCEDURE — 25000003 PHARM REV CODE 250: Performed by: PHYSICIAN ASSISTANT

## 2025-02-24 PROCEDURE — 97535 SELF CARE MNGMENT TRAINING: CPT

## 2025-02-24 PROCEDURE — 63600175 PHARM REV CODE 636 W HCPCS: Performed by: PHYSICIAN ASSISTANT

## 2025-02-24 RX ORDER — HYDRALAZINE HYDROCHLORIDE 25 MG/1
25 TABLET, FILM COATED ORAL EVERY 12 HOURS
Status: DISCONTINUED | OUTPATIENT
Start: 2025-02-24 | End: 2025-02-25

## 2025-02-24 RX ORDER — HYDRALAZINE HYDROCHLORIDE 25 MG/1
25 TABLET, FILM COATED ORAL EVERY 12 HOURS
Qty: 60 TABLET | Refills: 11 | Status: SHIPPED | OUTPATIENT
Start: 2025-02-24 | End: 2025-02-26 | Stop reason: HOSPADM

## 2025-02-24 RX ORDER — AMLODIPINE BESYLATE 10 MG/1
10 TABLET ORAL DAILY
Qty: 90 TABLET | Refills: 3 | Status: SHIPPED | OUTPATIENT
Start: 2025-02-25 | End: 2025-02-26 | Stop reason: HOSPADM

## 2025-02-24 RX ADMIN — HEPARIN SODIUM 5000 UNITS: 5000 INJECTION INTRAVENOUS; SUBCUTANEOUS at 08:02

## 2025-02-24 RX ADMIN — ATORVASTATIN CALCIUM 10 MG: 10 TABLET, FILM COATED ORAL at 08:02

## 2025-02-24 RX ADMIN — AMLODIPINE BESYLATE 10 MG: 10 TABLET ORAL at 08:02

## 2025-02-24 RX ADMIN — HYDRALAZINE HYDROCHLORIDE 25 MG: 25 TABLET ORAL at 08:02

## 2025-02-24 NOTE — PLAN OF CARE
SW attempt to get n contact with pt POA in regards to snf options. SW left a VM and will follow up.     [facility] - Acute Medical Stepdown  Discharge Reassessment    Primary Care Provider: No, Primary Doctor    Expected Discharge Date: 2/26/2025    Reassessment (most recent)       Discharge Reassessment - 02/24/25 1603          Discharge Reassessment    Assessment Type Discharge Planning Reassessment     Did the patient's condition or plan change since previous assessment? Yes     Discharge Plan discussed with: POA     Name(s) and Number(s) Lynn [phone]     Communicated DOTTIE with patient/caregiver Yes     Discharge Plan A Skilled Nursing Facility     Discharge Plan B Home with family     DME Needed Upon Discharge  other (see comments)     Transition of Care Barriers Mobility     Why the patient remains in the hospital Requires continued medical care        Post-Acute Status    Post-Acute Authorization Placement     Hospital Resources/Appts/Education Provided Appointments scheduled and added to AVS     Discharge Delays None known at this time                       Discharge Plan A and Plan B have been determined by review of patient's clinical status, future medical and therapeutic needs, and coverage/benefits for post-acute care in coordination with multidisciplinary team members.    Triny Hughes MSW, CSW

## 2025-02-24 NOTE — ASSESSMENT & PLAN NOTE
Patient's with Normocytic anemia.. Hemoglobin stable. Etiology likely due to chronic disease .  Current CBC reviewed-    Recent Labs   Lab 02/22/25  1720 02/23/25  0422 02/24/25  0243   HGB 10.3* 10.0* 10.6*           Component Value Date/Time    MCV 88 02/24/2025 0243    RDW 13.4 02/24/2025 0243    FERRITIN 22 03/28/2020 2325     Monitor CBC and transfuse if H/H drops below 7/21.

## 2025-02-24 NOTE — PROGRESS NOTES
Alex Reich - Acute [facility] Medicine  Progress Note    Patient Name: Gloria Garcia  MRN: [id number]  Patient Class: IP- Inpatient   Admission Date: 2/22/2025  Length of Stay: 2 days  Attending Physician: Richar Truong MD  Primary Care Provider: Hailey, Primary Doctor        Subjective     Principal Problem:Syncope        HPI:  Gloria Garcia is a 89 y.o. female with a PMHx of HTN, dementia, COPD, constipation and asthma who presents to [facility] for evaluation of syncope. Patient is a poor historian. She reports suddenly passing out while sitting down watching TV earlier today. She's unsure how long she was out for. Denies any fall or head trauma, though unreliable historian. She complained of dizziness and weakness to ED provider, however she denies these symptoms at the time of my exam. Denies any preceding lightheadedness, chest pain, or SOB earlier today. She's unsure if she took her home meds this AM. She is otherwise feeling well without any complaints of chest pain, LE swelling, headache, weakness, or SOB.    ED: hypertensive to /95, vitals otherwise stable. No leukocytosis. K 3.0, Mag 1.3, Ca 6.0 (corrected 7.3). UA infectious. Given 500cc IVFs, IV Ca gluconate, IV mag, and PO klyte    Overview/Hospital Course:  2/23 AAOX2. denies symptoms. Syncope - likely due to dehydration and electrolyte abnormalities. CTHead -  No acute intracranial abnormality detected.  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 CXR WNL , UA+, UC pending. continue IV ceftriaxone.  s/p 500cc IVFs.  echo ordered. neuro checks q4hr. calcium improved to 8.8  after given 1g IV Ca gluconate in the ED. SBP 200s improved to 140s Cr 0.7--> 1. monitor. CPK levels WNL . continue telemetry .   2/24 Lisinopril discontinued. renal sonogram - Bilateral medical renal disease.  No hydronephrosis. Simple left renal cyst. started on hydralazine 25mg q 12h  for elevated BP. UC - Multiple  organisms isolated. None in predominance.  Ceftriaxone discontinued. DVT sonogram - No evidence of deep venous thrombosis in either lower extremity and D dimer 0.73. Echo -  Left Ventricle: The left ventricle is normal in size. Ventricular mass is normal. Normal wall thickness. Regional wall motion abnormalities present. See diagram for wall motion findings. Septal motion is abnormal. There is normal systolic function with a visually estimated ejection fraction of 60 - 65%. Ejection fraction is approximately 63%. Quantitated ejection fraction is 64%. There is indeterminate diastolic function.    Right Ventricle: Normal right ventricular cavity size. Wall thickness is normal. Systolic function is normal.    Left Atrium: Normal left atrial size.    Right Atrium: Normal right atrial size.    Aortic Valve: There is mild aortic valve sclerosis. Aortic valve peak velocity is 1.5 m/s. Mean gradient is 4 mmHg.    Mitral Valve: There is bileaflet sclerosis. There is normal leaflet mobility.    Tricuspid Valve: The tricuspid valve is structurally normal. There is mild regurgitation.    Pulmonic Valve: The pulmonic valve is structurally normal. There is mild regurgitation.    Pulmonary Artery: There is mild to moderate pulmonary hypertension. The estimated pulmonary artery systolic pressure is 49 mmHg.    IVC/SVC: Normal venous pressure at 3 mmHg.    Pericardium: There is no pericardial effusion. orthostatic BP.  PT/OT consulted - recs SNF        Review of Systems:   Pain scale:    Constitutional:  fever,  chills, headache, vision loss, hearing loss, weight loss, Generalized weakness, falls, loss of smell, loss of taste, poor appetite,  sore throat  Respiratory: cough, shortness of breath.   Cardiovascular: chest pain, dizziness, palpitations, orthopnea, swelling of feet, syncope  Gastrointestinal: nausea, vomiting, abdominal pain, diarrhea, black stool,  blood in stool, change in bowel habits, constipation  Genitourinary:  hematuria, dysuria, urgency, frequency  Integument/Breast: rash,  pruritis  Hematologic/Lymphatic: easy bruising, lymphadenopathy  Musculoskeletal: arthralgias , myalgias, back pain, neck pain, knee pain  Neurological: confusion, seizures, tremors, slurred speech  Behavioral/Psych:  depression, anxiety, auditory or visual hallucinations     OBJECTIVE:     Physical Exam:  Body mass index is 17.41 kg/m².    Constitutional: Appears thin built and frail  Head: Normocephalic and atraumatic.   Neck: Normal range of motion. Neck supple.   Cardiovascular: Normal heart rate.  Regular heart rhythm.  Pulmonary/Chest: Effort normal.   Abdominal: No distension.  No tenderness  Musculoskeletal: Normal range of motion. No edema.   Neurological: Alert and oriented to person, place. able to move bilateral upper and lower extremities without limitation . poor historian  Skin: Skin is warm and dry.   Psychiatric: Normal mood and affect. Behavior is normal.                  Vital Signs  Temp: 99.2 °F (37.3 °C) (02/24/25 0815)  Pulse: 69 (02/24/25 1205)  Resp: (!) 21 (02/24/25 1205)  BP: (!) 141/77 (02/24/25 1205)  SpO2: 99 % (02/24/25 1205)     24 Hour VS Range    Temp:  [98.4 °F (36.9 °C)-99.5 °F (37.5 °C)]   Pulse:  [60-81]   Resp:  [19-44]   BP: (123-176)/(58-84)   SpO2:  [95 %-99 %]     Intake/Output Summary (Last 24 hours) at 2/24/2025 1430  Last data filed at 2/23/2025 1832  Gross per 24 hour   Intake --   Output 150 ml   Net -150 ml         I/O This Shift:  No intake/output data recorded.    Wt Readings from Last 3 Encounters:   02/24/25 46 kg (101 lb 6.6 oz)   02/18/25 39.3 kg (86 lb 10.3 oz)   11/20/24 43.2 kg (95 lb 3.8 oz)       I have personally reviewed the vitals and recorded Intake/Output     Laboratory/Diagnostic Data:    CBC/Anemia Labs: Coags:    Recent Labs   Lab 02/22/25  1720 02/22/25  1907 02/23/25  0422 02/24/25  0243   WBC 5.04  --  4.65 4.67   HGB 10.3*  --  10.0* 10.6*   HCT 33.7* 30* 33.0* 34.1*     --  " 246 263   MCV 89  --  88 88   RDW 13.2  --  13.3 13.4    No results for input(s): "PT", "INR", "APTT" in the last 168 hours.     Chemistries: ABG:   Recent Labs   Lab 02/22/25  1720 02/23/25  0419 02/24/25  0243    138 138   K 3.0* 4.7 4.8   * 106 105   CO2 19* 25 23   BUN 17 16 17   CREATININE 0.7 1.0 1.0   CALCIUM 6.0* 8.8 8.8   PROT 4.6*  --   --    BILITOT 0.3  --   --    ALKPHOS 43  --   --    ALT <5*  --   --    AST 23  --   --    MG 1.3* 2.6 2.2   PHOS  --  2.5* 3.5    No results for input(s): "PH", "PCO2", "PO2", "HCO3", "POCSATURATED", "BE" in the last 168 hours.     POCT Glucose: HbA1c:    Recent Labs   Lab 02/23/25  0417 02/23/25  0851 02/23/25  1303 02/23/25  1658 02/24/25  0808 02/24/25  1212   POCTGLUCOSE 84 79 108 97 83 94    Hemoglobin A1C   Date Value Ref Range Status   02/23/2025 5.2 4.0 - 5.6 % Final     Comment:     ADA Screening Guidelines:  5.7-6.4%  Consistent with prediabetes  >or=6.5%  Consistent with diabetes    High levels of fetal hemoglobin interfere with the HbA1C  assay. Heterozygous hemoglobin variants (HbS, HgC, etc)do  not significantly interfere with this assay.   However, presence of multiple variants may affect accuracy.     03/21/2024 5.4 4.7 - 5.6 % Final        Cardiac Enzymes: Ejection Fractions:    Recent Labs     02/23/25  0419   CPK 71    EF   Date Value Ref Range Status   02/24/2025 63 % Final          Recent Labs   Lab 02/22/25 2008   COLORU Yellow   APPEARANCEUA Hazy*   PHUR 6.0   SPECGRAV 1.025   PROTEINUA Trace*   GLUCUA Negative   KETONESU Negative   BILIRUBINUA Negative   OCCULTUA Trace*   NITRITE Negative   LEUKOCYTESUR 1+*   RBCUA 11*   WBCUA 17*   BACTERIA Many*   SQUAMEPITHEL 1       Procalcitonin (ng/mL)   Date Value   03/28/2020 0.05     Lactate (Lactic Acid) (mmol/L)   Date Value   03/28/2020 0.7     BNP (pg/mL)   Date Value   02/22/2025 83   11/20/2024 107 (H)   10/06/2024 80   07/09/2024 49   04/20/2024 31     CRP (mg/L)   Date Value "   03/28/2020 40.3 (H)     Sed Rate (mm/Hr)   Date Value   03/28/2020 36 (H)     D-Dimer (mg/L FEU)   Date Value   02/24/2025 0.73 (H)   10/06/2024 0.81 (H)     Ferritin (ng/mL)   Date Value   03/28/2020 22     LD (U/L)   Date Value   03/28/2020 244     Troponin I (ng/mL)   Date Value   11/20/2024 0.019   10/06/2024 0.008   07/09/2024 0.006   04/20/2024 0.011   08/02/2022 0.007   03/30/2020 0.020   03/29/2020 0.019   03/28/2020 0.058 (H)     CPK (U/L)   Date Value   02/23/2025 71   03/28/2020 87     No results found for this or any previous visit.  SARS-CoV2 (COVID-19) Qualitative PCR (no units)   Date Value   03/29/2020 Undetected     SARS-CoV-2 RNA, Amplification, Qual (no units)   Date Value   11/21/2024 Negative   07/09/2024 Negative     POC Rapid COVID (no units)   Date Value   11/10/2024 Negative   10/06/2024 Negative   08/02/2022 Negative       Microbiology labs for the last week  Microbiology Results (last 7 days)       Procedure Component Value Units Date/Time    Urine culture [[id number]] Collected: 02/22/25 2008    Order Status: Completed Specimen: Urine Updated: 02/24/25 0616     Urine Culture, Routine Multiple organisms isolated. None in predominance.  Repeat if      clinically necessary.    Narrative:      Specimen Source->Urine            Reviewed and noted in plan where applicable- Please see chart for full lab data.    Lines/Drains:       Peripheral IV - Single Lumen 02/22/25 18 G Left Antecubital (Active)   Number of days: 1       Imaging  ECG Results              EKG 12-lead (Final result)        Collection Time Result Time QRS Duration OHS QTC Calculation    02/22/25 16:01:57 02/23/25 09:32:54 68 466                     Final result by Interface, Lab In [facility] (02/23/25 09:33:01)                   Narrative:    Test Reason : R55,    Vent. Rate :  60 BPM     Atrial Rate :  60 BPM     P-R Int : 170 ms          QRS Dur :  68 ms      QT Int : 466 ms       P-R-T Axes : -28   1  33 degrees    QTcB Int  : 466 ms    Sinus rhythm with Premature supraventricular complexes  Minimal voltage criteria for LVH, may be normal variant ( Sokolow-Piper )  Low septal forces ; Abnormal R wave progression in the precordial leads  Nonspecific T wave abnormality  Prolonged QT  Abnormal ECG  When compared with ECG of 20-Nov-2024 15:36,  Vent. rate has decreased by  36 bpm  ST elevation now present in Anterior leads  Confirmed by Steve Granados ([id number]) on 2/23/2025 9:32:52 AM    Referred By: AAAREFERRAL SELF           Confirmed By: Steve Granados                                    No results found for this or any previous visit.      US Lower Extremity Veins Bilateral  Narrative: EXAMINATION:  US LOWER EXTREMITY VEINS BILATERAL    CLINICAL HISTORY:  r/o DVT;    TECHNIQUE:  Duplex and color flow Doppler and dynamic compression was performed of the bilateral lower extremity veins was performed.    COMPARISON:  None    FINDINGS:  Right thigh veins: The common femoral, femoral, popliteal, upper greater saphenous, and deep femoral veins are patent and free of thrombus. The veins are normally compressible and have normal phasic flow and augmentation response.    Right calf veins: The visualized calf veins are patent.    Left thigh veins: The common femoral, femoral, popliteal, upper greater saphenous, and deep femoral veins are patent and free of thrombus. The veins are normally compressible and have normal phasic flow and augmentation response.    Left calf veins: The visualized calf veins are patent.    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  Impression: No evidence of deep venous thrombosis in either lower extremity.    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    Electronically signed by: Charles Oleary  MD  Date:    02/24/2025  Time:    11:16  Echo    Left Ventricle: The left ventricle is normal in size. Ventricular mass   is normal. Normal wall thickness. Regional wall motion abnormalities   present. See diagram for wall motion findings. Septal motion is abnormal.   There is normal systolic function with a visually estimated ejection   fraction of 60 - 65%. Ejection fraction is approximately 63%. Quantitated   ejection fraction is 64%. There is indeterminate diastolic function.    Right Ventricle: Normal right ventricular cavity size. Wall thickness   is normal. Systolic function is normal.    Left Atrium: Normal left atrial size.    Right Atrium: Normal right atrial size.    Aortic Valve: There is mild aortic valve sclerosis. Aortic valve peak   velocity is 1.5 m/s. Mean gradient is 4 mmHg.    Mitral Valve: There is bileaflet sclerosis. There is normal leaflet   mobility.    Tricuspid Valve: The tricuspid valve is structurally normal. There is   mild regurgitation.    Pulmonic Valve: The pulmonic valve is structurally normal. There is   mild regurgitation.    Pulmonary Artery: There is mild to moderate pulmonary hypertension. The   estimated pulmonary artery systolic pressure is 49 mmHg.    IVC/SVC: Normal venous pressure at 3 mmHg.    Pericardium: There is no pericardial effusion.  US Retroperitoneal Complete  Narrative: EXAMINATION:  US RETROPERITONEAL COMPLETE    CLINICAL HISTORY:  ROD;    TECHNIQUE:  Ultrasound of the kidneys and urinary bladder was performed including color flow and Doppler evaluation of the kidneys.    COMPARISON:  CT abdomen pelvis 11/20/2024.    FINDINGS:  Evaluation limited due to patient cooperation.    Right kidney: The right kidney measures 8.8 cm. Cortical scarring versus lobular contour without overall cortical thinning.  Mildly diminished perfusion.  Resistive index measures 0.80.  No mass. No renal stone. No hydronephrosis.    Left kidney: The left kidney measures 7.1 cm. Cortical  scarring versus lobular contour without overall cortical thinning.  Mildly diminished perfusion.  Resistive index measures 0.85.  1.3 cm simple cyst at the upper pole.  No solid mass.  No renal stone. No hydronephrosis.    The bladder is under distended, limiting evaluation.    Splenic resistive index measures 0.69.  Impression: Bilateral medical renal disease.  No hydronephrosis.    Simple left renal cyst.    Electronically signed by resident: Chapito Salgado  Date:    02/24/2025  Time:    05:09    Electronically signed by: Charles Oleary MD  Date:    02/24/2025  Time:    07:29  X-Ray Chest AP Portable  Narrative: EXAMINATION:  XR CHEST AP PORTABLE    CLINICAL HISTORY:  increased work of breathing;    TECHNIQUE:  Single frontal view of the chest was performed.    COMPARISON:  Chest radiograph February 22, 2025    FINDINGS:  Single portable chest radiograph is submitted.  When accounting for position and technique and depth of inspiration the appearance of the cardiomediastinal silhouette is stable.  Aortic atherosclerotic change and tortuosity appears stable.    Mild chronic and atelectatic appearing changes are noted, there is no evidence for superimposed confluent infiltrate or consolidation, significant pleural effusion or pneumothorax.    The visualized osseous structures appear intact, chronic change noted.  Impression: Chronic and atelectatic appearing change, there is no radiographic evidence for superimposed acute intrathoracic process.    Electronically signed by: Miles Hawley  Date:    02/24/2025  Time:    05:54      Labs, Imaging, EKG and Diagnostic results from 2/24/2025 were reviewed.    Medications:  Medication list was reviewed and changes noted under Assessment/Plan.  Medications Ordered Prior to Encounter[1]  Scheduled Medications:  Current Facility-Administered Medications   Medication Dose Route Frequency    amLODIPine  10 mg Oral Daily    atorvastatin  10 mg Oral QHS    heparin (porcine)  5,000  Units Subcutaneous Q12H    hydrALAZINE  25 mg Oral Q12H     PRN:   Current Facility-Administered Medications:     acetaminophen, 650 mg, Oral, Q4H PRN    albuterol-ipratropium, 3 mL, Nebulization, Q4H PRN    dextrose 50%, 12.5 g, Intravenous, PRN    dextrose 50%, 25 g, Intravenous, PRN    glucagon (human recombinant), 1 mg, Intramuscular, PRN    glucose, 16 g, Oral, PRN    glucose, 24 g, Oral, PRN    melatonin, 6 mg, Oral, Nightly PRN    polyethylene glycol, 17 g, Oral, Daily PRN    sodium chloride 0.9%, 10 mL, Intravenous, PRN  Infusions:   Estimated Creatinine Clearance: 28.8 mL/min (based on SCr of 1 mg/dL).             Assessment and Plan     * Syncope  - likely due to dehydration and electrolyte abnormalities, UTI likely contributing   - will check CTH given patient is a poor historian  - CXR, UA pending  - s/p 500cc IVFs  - echo ordered  - replace lytes  - monitor tele  - neuro checks q4hr  2/23 Syncope - likely due to dehydration and electrolyte abnormalities. CTHead -  No acute intracranial abnormality detected.  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 CXR WNL , UA+, UC pending. continue IV ceftriaxone.  s/p 500cc IVFs.  echo orderedneuro checks q4hr. calcium improved to 8.8  after given 1g IV Ca gluconate in the ED. continue telemetry  2/24   DVT sonogram - No evidence of deep venous thrombosis in either lower extremity and D dimer 0.73.   Echo -  Left Ventricle: The left ventricle is normal in size. Ventricular mass is normal. Normal wall thickness. Regional wall motion abnormalities present. See diagram for wall motion findings. Septal motion is abnormal. There is normal systolic function with a visually estimated ejection fraction of 60 - 65%. Ejection fraction is approximately 63%. Quantitated ejection fraction is 64%. There is indeterminate diastolic function.    Right Ventricle: Normal right ventricular cavity size. Wall thickness is normal. Systolic  function is normal.    Left Atrium: Normal left atrial size.    Right Atrium: Normal right atrial size.    Aortic Valve: There is mild aortic valve sclerosis. Aortic valve peak velocity is 1.5 m/s. Mean gradient is 4 mmHg.    Mitral Valve: There is bileaflet sclerosis. There is normal leaflet mobility.    Tricuspid Valve: The tricuspid valve is structurally normal. There is mild regurgitation.    Pulmonic Valve: The pulmonic valve is structurally normal. There is mild regurgitation.    Pulmonary Artery: There is mild to moderate pulmonary hypertension. The estimated pulmonary artery systolic pressure is 49 mmHg.    IVC/SVC: Normal venous pressure at 3 mmHg.    Pericardium: There is no pericardial effusion. PT/OT consulted     Anemia    Patient's with Normocytic anemia.. Hemoglobin stable. Etiology likely due to chronic disease .  Current CBC reviewed-    Recent Labs   Lab 02/22/25  1720 02/23/25  0422 02/24/25  0243   HGB 10.3* 10.0* 10.6*           Component Value Date/Time    MCV 88 02/24/2025 0243    RDW 13.4 02/24/2025 0243    FERRITIN 22 03/28/2020 2325     Monitor CBC and transfuse if H/H drops below 7/21.      Hypocalcemia  - Ca 6.0, corrected for albumin is 7.3  - given 1g IV Ca gluconate in the ED  - further replacement as indicated  - trend daily labs  2/23  calcium improved to 8.8  after given 1g IV Ca gluconate in the ED.    Acute cystitis without hematuria  - afebrile without leukocytosis  - continue IV rocephin 1g q24hrs  - follow urine cx  2/24  UC - Multiple organisms isolated. None in predominance.  Ceftriaxone discontinued. Echo pending.  PT/OT consulted    Hypomagnesemia  Patient has Abnormal Magnesium: hypomagnesemia. Will continue to monitor electrolytes closely. Will replace the affected electrolytes and repeat labs to be done after interventions completed. The patient's magnesium results have been reviewed and are listed below.  Recent Labs   Lab 02/24/25  0243   MG 2.2          Hypokalemia  -  K 3.0  - replace PO  - monitor daily labs  - tele   2/23 resolved     Asthma-COPD overlap syndrome  - no acute issues  - satting 97% on RA  - duonebs PRN    Essential hypertension  - uncontrolled BP in the setting of questionable med compliance  - home benazepril not on formulary-- will sub w/ lisinopril 20mg daily with first dose tonight given unsure if pt took home meds today  - continue home amlodipine 10mg daily  2/23  SBP 200s.   Chronic, controlled.  Latest blood pressure and vitals reviewed-   Temp:  [97.8 °F (36.6 °C)-99.5 °F (37.5 °C)]   Pulse:  [60-81]   Resp:  [19-44]   BP: (123-196)/()   SpO2:  [94 %-100 %] .   Home meds for hypertension were reviewed and noted below. Hospital anti-hypertensive changes were made as shown below.  Hypertension Medications              [id number]  Lisinoprol 20mg  Take 1 capsule by mouth every morning.  Take 1 capsule by mouth every morning.          Hydralazine - PRN meds if BP> 170/110 mm HG     ROD (acute kidney injury)  ROD is likely due to pre-renal azotemia due to intravascular volume depletion. Baseline creatinine is  0.7 . Most recent creatinine and eGFR are listed below.  Recent Labs     02/22/25  1720 02/23/25  0419 02/24/25  0243   CREATININE 0.7 1.0 1.0   EGFRNORACEVR >60.0 54.5* 54.5*      Plan    - Avoid nephrotoxins and renally dose meds for GFR listed above  - Monitor urine output, serial BMP, and adjust therapy as needed  - renal sonogram and CPK levels   monitor on lisinopril   2/24 Lisinopril discontinued. renal sonogram - Bilateral medical renal disease.  No hydronephrosis. Simple left renal cyst. started on hydralazine 25mg q 12h  for elevated BP.       VTE Risk Mitigation (From admission, onward)           Ordered     heparin (porcine) injection 5,000 Units  Every 12 hours         02/22/25 1913     IP VTE HIGH RISK PATIENT  Once         02/22/25 1902     IP VTE HIGH RISK PATIENT  Once         02/22/25 1902                    Discharge  Planning   DOTTIE: 2/26/2025     Code Status: Full Code   Medical Readiness for Discharge Date:   Discharge Plan A: Home with family, Home Health   Discharge Delays: None known at this time                    Richar Truong MD  Department of Hospital Medicine   [facility] - Acute Medical Stepdown         [1]   No current facility-administered medications on file prior to encounter.     Current Outpatient Medications on File Prior to Encounter   Medication Sig Dispense Refill    albuterol (PROVENTIL/VENTOLIN HFA) 90 mcg/actuation inhaler Inhale 2 puffs into the lungs every 6 (six) hours as needed. Rescue 18 g 5    albuterol-ipratropium (DUO-NEB) 2.5 mg-0.5 mg/3 mL nebulizer solution use 1 vial by nebulization every 4 (four) hours as needed for Wheezing or Shortness of Breath. Rescue 90 mL 2    amlodipine-benazepril 10-20mg (LOTREL) 10-20 mg per capsule Take 1 capsule by mouth every morning.      ARICEPT 5 mg tablet Take 5 mg by mouth every evening.      atorvastatin (LIPITOR) 10 MG tablet Take 10 mg by mouth once daily.      benzonatate (TESSALON) 100 MG capsule Take 200 mg by mouth.      budesonide-glycopyr-formoterol (BREZTRI AEROSPHERE) 160-9-4.8 mcg/actuation HFAA Inhale 2 puffs into the lungs 2 (two) times a day. 10.7 g 5    doxycycline (MONODOX) 100 MG capsule Take 100 mg by mouth 2 (two) times daily.      promethazine (PHENERGAN) 6.25 mg/5 mL syrup Take 5 mLs by mouth 2 (two) times daily as needed.

## 2025-02-24 NOTE — ASSESSMENT & PLAN NOTE
- uncontrolled BP in the setting of questionable med compliance  - home benazepril not on formulary-- will sub w/ lisinopril 20mg daily with first dose tonight given unsure if pt took home meds today  - continue home amlodipine 10mg daily  2/23  SBP 200s.   Chronic, controlled.  Latest blood pressure and vitals reviewed-   Temp:  [97.8 °F (36.6 °C)-99.5 °F (37.5 °C)]   Pulse:  [60-81]   Resp:  [19-44]   BP: (123-196)/()   SpO2:  [94 %-100 %] .   Home meds for hypertension were reviewed and noted below. Hospital anti-hypertensive changes were made as shown below.  Hypertension Medications              [id number]  Lisinoprol 20mg  Take 1 capsule by mouth every morning.  Take 1 capsule by mouth every morning.          Hydralazine - PRN meds if BP> 170/110 mm HG

## 2025-02-24 NOTE — PLAN OF CARE
Problem: Occupational Therapy  Goal: Occupational Therapy Goal  Description: Goals to be met by: 3/26/25     Patient will increase functional independence with ADLs by performing:    UE Dressing with [place].  LE Dressing with Modified [place].  Grooming while standing at sink with Modified [place].  Toileting from toilet with Modified [place] for hygiene and clothing management.   Toilet transfer to toilet with Modified [place].    Outcome: Progressing     OT eval completed.

## 2025-02-24 NOTE — NURSING
Pt AAOx4. NAD. VSS. Room air with sat 98%. Orthostatic BP taken and recorded. Blood sugar monitored. Echo and US retroperitoneal done today. No acute events. Safety measures in place. No concerns or complaints at present time. Call light in reach.

## 2025-02-24 NOTE — PLAN OF CARE
Problem: Occupational Therapy  Goal: Occupational Therapy Goal  Outcome: Met     OT eval completed, no acute needs, will dc from service.

## 2025-02-24 NOTE — PT/OT/SLP EVAL
Physical Therapy Co-Evaluation with OT and Treatment     Patient Name:  Gloria Garcia  MRN: [id number]    Admit Date: 2/22/2025  Admitting Diagnosis:  Syncope  Length of Stay: 2 days  Recent Surgery: * No surgery found *      Recommendations:     Discharge Recommendations: moderate intensity therapy  Equipment recommendations: none  Barriers to discharge: Increased level of skilled assistance required and Fall risk     Assessment:     Gloria Garcia is a 89 y.o. female admitted to [facility] [place] on 2/22/2025 with medical diagnosis of Syncope. Pt presents with weakness, impaired endurance, impaired self care skills, impaired functional mobility, impaired balance, decreased coordination, decreased safety awareness, impaired cardiopulmonary response to activity. These deficits effect their roles and responsibilities in which they were able to complete prior to admit.     Pt agreeable to therapy evaluation and session. PTA, pt states that she was ambulating with a RW and receives help with ADLs from family.   Pt trialed 2 stands from eob with RW. Performed lateral steps toward HOB. Persistent dizziness throughout session with BP WNL (141/78). OOB mobility limited d/t c/o dizziness. Pt returned to supine at end. Will continue to assess mobility as tolerated.    Gloria Garica would benefit from acute PT intervention to improve quality of life, focus on recovery of impairments, provide patient/caregiver education, reduce fall risk, and maximize (I) and safety with functional mobility. Once medically stable, recommending pt discharge to moderate intensity therapy. Patient currently demonstrates a need for moderate intensity therapy on a daily basis post acute secondary to a decline in functional status due to illness .      Rehab Prognosis: Good    Plan:     During this hospitalization, patient to be seen 3 x/week to address the identified rehab impairments via gait training, therapeutic activities, therapeutic exercises,  neuromuscular re-education and progress towards stated goals.     Plan of Care Expires:  03/26/25  Plan of Care reviewed with: patient    This plan of care has been discussed with the patient/caregiver, who was included in its development and is in agreement with the identified goals and treatment plan.     Subjective     Communicated with RN prior to session.  Patient found HOB elevated upon PT entry to room, agreeable to evaluation. Pt alone during session.    Chief Complaint: none stated    Patient/Family Comments/goals: none stated    Pain/Comfort:  Pain Rating 1: 0/10  Pain Rating Post-Intervention 1: 0/10    Patients cultural, spiritual, Scientologist conflicts given the current situation: None identified     Patient History: information obtained from pt     Living Environment: Pt lives with sister and niece in single level home  with ramp access. Bathroom set-up: tub/shower combo  Prior Level of Function: required caregiver assist for ADLs; ambulates with RW  DME owned: rolling walker  Support Available/Caregiver Assistance: family  Drives: no (niece does)  Objective:   OT present for coeval due to pt's multiple medical comorbidities and functional/cognition deficits requiring two skilled therapists to appropriately progress pt's musculoskeletal strength, neuromuscular control, and endurance while taking into consideration medical acuity and pt safety.    Patient found with: telemetryTunrer    Recent Surgery: * No surgery found *    General Precautions: Standard, fall   Orthopedic Precautions:    Braces:     Oxygen Device: room air      Exams:    Cognition:  Oriented X 3 (not month/year)  Command following: Follows multistep verbal commands  Communication: clear/fluent    Sensation:   Light touch sensation: Intact BLEs    Gross Motor Coordination: No deficits noted during functional mobility tasks     Edema/Skin Integrity: None noted; Visible skin intact    Postural examination/scapula alignment: Rounded  shoulder and Head forward    Lower Extremity Range of Motion:  Right Lower Extremity: WFL  Left Lower Extremity: WFL    Lower Extremity Strength:  Right Lower Extremity: grossly 4/5  Left Lower Extremity: grossly 4/5    Functional Mobility:    Bed Mobility:  Supine > Sit with stand by assistance  Sit > Supine with stand by assistance    Transfers:   Sit <> Stand Transfer: Contact Guard Assistance x 2 trials from eob with RW AD   Bed <> Chair: deferred d/t c/o persistent dizziness             Gait:  Distance: 2 lateral steps  Assistance level: Contact Guard Assistance  Assistive Device: rolling walker  Gait Assessment: decreased step length , decreased chiqui, decreased gait speed, and narrow base of support    Balance:  Dynamic Sitting: GOOD: Maintains balance through MODERATE excursions of active trunk movement  Standing:  Static: FAIR+: Takes MINIMAL challenges from all directions   Dynamic: FAIR: Needs CONTACT GUARD during gait    Outcome Measure: AM-PAC 6 CLICK MOBILITY  Total Score:18     Patient/Caregiver Education:     Therapist educated pt/caregiver regarding:   PT POC and goals for therapy   Safety with mobility and fall risk   Instruction on use of call button and importance of calling nursing staff for assistance with mobility   Time provided for therapeutic counseling and discussion of current health disposition. All questions answered to satisfaction, within scope of PT practice     Patient/caregiver able to verbalize understanding and expressed no further questions this visit; will follow-up with pt/caregiver during current admit for additional questions/concerns within scope of practice.     White board updated.     Patient left HOB elevated with all lines intact and call button in reach. VSS    GOALS:   Multidisciplinary Problems       Physical Therapy Goals          Problem: Physical Therapy    Goal Priority Disciplines Outcome Interventions   Physical Therapy Goal     PT, PT/OT Progressing     Description: Goals to be met by: 3/26/25     Patient will increase functional independence with mobility by performin. Sit to stand transfer with Supervision  2. Bed to chair transfer with Supervision using Rolling Walker  3. Gait  x 150 feet with Supervision using Rolling Walker.     Patient demonstrates a mobility limitation that significantly impairs their ability to participate in one or more mobility related activities of daily living. Patient's mobility limitation cannot be sufficiently resolved with the use of a cane, but can be sufficiently resolved with the use of a rolling walker.The use of a rolling walker will considerably improve their ability to participate in MRADLs. Patient will use the walker on a regular basis at home.                           History:     Past Medical History:   Diagnosis Date    Asthma     COPD exacerbation 2024    Hypertension        Past Surgical History:   Procedure Laterality Date    HYSTERECTOMY         Time Tracking:     PT Received On: 25  PT Start Time: 918     PT Stop Time: 939  PT Total Time (min): 21 min     Billable Minutes: Evaluation 10 and Therapeutic Activity 11    2025

## 2025-02-24 NOTE — DISCHARGE SUMMARY
Alex Reich - [facility] Medicine  Discharge Summary      Patient Name: Gloria Garcia  MRN: [id number]  SABI: [id number]  Patient Class: IP- Inpatient  Admission Date: 2/22/2025  Hospital Length of Stay: 2 days  Discharge Date and Time:  02/24/2025 11:30 AM  Attending Physician: Richar Truong MD   Discharging Provider: Richar Truong MD  Primary Care Provider: Hailey Primary Doctor  [facility] Medicine Team: [facility] Richar Truong MD  Primary Care Team: [facility]    HPI:   Gloria Garcia is a 89 y.o. female with a PMHx of HTN, dementia, COPD, constipation and asthma who [place] [facility] [place]. Patient is a poor historian. She reports suddenly passing out while sitting down watching TV earlier today. She's unsure how long she was out for. Denies any fall or head trauma, though unreliable historian. She complained of dizziness and weakness to ED provider, however she denies these symptoms at the time of my exam. Denies any preceding lightheadedness, chest pain, or SOB earlier today. She's unsure if she took her home meds this AM. She is otherwise feeling well without any complaints of chest pain, LE swelling, headache, weakness, or SOB.    ED: hypertensive to /95, vitals otherwise stable. No leukocytosis. K 3.0, Mag 1.3, Ca 6.0 (corrected 7.3). UA infectious. Given 500cc IVFs, IV Ca gluconate, IV mag, and PO klyte    * No surgery found *      Hospital Course:   2/23 AAOX2. denies symptoms. Syncope - likely due to dehydration and electrolyte abnormalities. CTHead -  No acute intracranial abnormality detected.  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 CXR WNL , UA+, UC pending. continue IV ceftriaxone.  s/p 500cc IVFs.  echo ordered. neuro checks q4hr. calcium improved to 8.8  after given 1g IV Ca gluconate in the ED. SBP 200s improved to 140s Cr 0.7--> 1. monitor. CPK levels WNL . continue telemetry .   2/24  Lisinopril discontinued. renal sonogram - Bilateral medical renal disease.  No hydronephrosis. Simple left renal cyst. started on hydralazine 25mg q 12h  for elevated BP. UC - Multiple organisms isolated. None in predominance.  Ceftriaxone discontinued. DVT sonogram - No evidence of deep venous thrombosis in either lower extremity and D dimer 0.73. Echo -  Left Ventricle: The left ventricle is normal in size. Ventricular mass is normal. Normal wall thickness. Regional wall motion abnormalities present. See diagram for wall motion findings. Septal motion is abnormal. There is normal systolic function with a visually estimated ejection fraction of 60 - 65%. Ejection fraction is approximately 63%. Quantitated ejection fraction is 64%. There is indeterminate diastolic function.    Right Ventricle: Normal right ventricular cavity size. Wall thickness is normal. Systolic function is normal.    Left Atrium: Normal left atrial size.    Right Atrium: Normal right atrial size.    Aortic Valve: There is mild aortic valve sclerosis. Aortic valve peak velocity is 1.5 m/s. Mean gradient is 4 mmHg.    Mitral Valve: There is bileaflet sclerosis. There is normal leaflet mobility.    Tricuspid Valve: The tricuspid valve is structurally normal. There is mild regurgitation.    Pulmonic Valve: The pulmonic valve is structurally normal. There is mild regurgitation.    Pulmonary Artery: There is mild to moderate pulmonary hypertension. The estimated pulmonary artery systolic pressure is 49 mmHg.    IVC/SVC: Normal venous pressure at 3 mmHg.    Pericardium: There is no pericardial effusion. orthostatics negative.   PT/OT consulted - recs SNF. patient does not want SNF. Discharge with        Goals of Care Treatment Preferences:  Code Status: Full Code      SDOH Screening:  The patient was screened for utility difficulties, food insecurity, transport difficulties, housing insecurity, and interpersonal safety and there were no concerns  identified this admission.     Consults:     * Syncope  - likely due to dehydration and electrolyte abnormalities, UTI likely contributing   - will check CTH given patient is a poor historian  - CXR, UA pending  - s/p 500cc IVFs  - echo ordered  - replace lytes  - monitor tele  - neuro checks q4hr  2/23 Syncope - likely due to dehydration and electrolyte abnormalities. CTHead -  No acute intracranial abnormality detected.  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 CXR WNL , UA+, UC pending. continue IV ceftriaxone.  s/p 500cc IVFs.  echo orderedneuro checks q4hr. calcium improved to 8.8  after given 1g IV Ca gluconate in the ED. continue telemetry  2/24   DVT sonogram - No evidence of deep venous thrombosis in either lower extremity and D dimer 0.73.   Echo -  Left Ventricle: The left ventricle is normal in size. Ventricular mass is normal. Normal wall thickness. Regional wall motion abnormalities present. See diagram for wall motion findings. Septal motion is abnormal. There is normal systolic function with a visually estimated ejection fraction of 60 - 65%. Ejection fraction is approximately 63%. Quantitated ejection fraction is 64%. There is indeterminate diastolic function.    Right Ventricle: Normal right ventricular cavity size. Wall thickness is normal. Systolic function is normal.    Left Atrium: Normal left atrial size.    Right Atrium: Normal right atrial size.    Aortic Valve: There is mild aortic valve sclerosis. Aortic valve peak velocity is 1.5 m/s. Mean gradient is 4 mmHg.    Mitral Valve: There is bileaflet sclerosis. There is normal leaflet mobility.    Tricuspid Valve: The tricuspid valve is structurally normal. There is mild regurgitation.    Pulmonic Valve: The pulmonic valve is structurally normal. There is mild regurgitation.    Pulmonary Artery: There is mild to moderate pulmonary hypertension. The estimated pulmonary artery systolic pressure is 49  mmHg.    IVC/SVC: Normal venous pressure at 3 mmHg.    Pericardium: There is no pericardial effusion. PT/OT consulted     Anemia    Patient's with Normocytic anemia.. Hemoglobin stable. Etiology likely due to chronic disease .  Current CBC reviewed-    Recent Labs   Lab 02/22/25  1720 02/23/25  0422 02/24/25  0243   HGB 10.3* 10.0* 10.6*           Component Value Date/Time    MCV 88 02/24/2025 0243    RDW 13.4 02/24/2025 0243    FERRITIN 22 03/28/2020 2325     Monitor CBC and transfuse if H/H drops below 7/21.      Hypocalcemia  - Ca 6.0, corrected for albumin is 7.3  - given 1g IV Ca gluconate in the ED  - further replacement as indicated  - trend daily labs  2/23  calcium improved to 8.8  after given 1g IV Ca gluconate in the ED.    Acute cystitis without hematuria  - afebrile without leukocytosis  - continue IV rocephin 1g q24hrs  - follow urine cx  2/24  UC - Multiple organisms isolated. None in predominance.  Ceftriaxone discontinued. Echo pending.  PT/OT consulted    Hypomagnesemia  Patient has Abnormal Magnesium: hypomagnesemia. Will continue to monitor electrolytes closely. Will replace the affected electrolytes and repeat labs to be done after interventions completed. The patient's magnesium results have been reviewed and are listed below.  Recent Labs   Lab 02/24/25  0243   MG 2.2          Hypokalemia  - K 3.0  - replace PO  - monitor daily labs  - tele   2/23 resolved     Asthma-COPD overlap syndrome  - no acute issues  - satting 97% on RA  - duonebs PRN    Essential hypertension  - uncontrolled BP in the setting of questionable med compliance  - home benazepril not on formulary-- will sub w/ lisinopril 20mg daily with first dose tonight given unsure if pt took home meds today  - continue home amlodipine 10mg daily  2/23  SBP 200s.   Chronic, controlled.  Latest blood pressure and vitals reviewed-   Temp:  [97.8 °F (36.6 °C)-99.5 °F (37.5 °C)]   Pulse:  [60-81]   Resp:  [19-44]   BP: (123-196)/()    SpO2:  [94 %-100 %] .   Home meds for hypertension were reviewed and noted below. Hospital anti-hypertensive changes were made as shown below.  Hypertension Medications              [id number]  Lisinoprol 20mg  Take 1 capsule by mouth every morning.  Take 1 capsule by mouth every morning.          Hydralazine - PRN meds if BP> 170/110 mm HG     ROD (acute kidney injury)  ROD is likely due to pre-renal azotemia due to intravascular volume depletion. Baseline creatinine is  0.7 . Most recent creatinine and eGFR are listed below.  Recent Labs     02/22/25  1720 02/23/25  0419 02/24/25  0243   CREATININE 0.7 1.0 1.0   EGFRNORACEVR >60.0 54.5* 54.5*      Plan    - Avoid nephrotoxins and renally dose meds for GFR listed above  - Monitor urine output, serial BMP, and adjust therapy as needed  - renal sonogram and CPK levels   monitor on lisinopril   2/24 Lisinopril discontinued. renal sonogram - Bilateral medical renal disease.  No hydronephrosis. Simple left renal cyst. started on hydralazine 25mg q 12h  for elevated BP.       Final Active Diagnoses:    Diagnosis Date Noted POA    PRINCIPAL PROBLEM:  Syncope [R55] 02/22/2025 Yes    Anemia [D64.9] 02/23/2025 Yes    Hypokalemia [E87.6] 02/22/2025 Yes    Hypomagnesemia [E83.42] 02/22/2025 Yes    Acute cystitis without hematuria [N30.00] 02/22/2025 Yes    Hypocalcemia [E83.51] 02/22/2025 Yes    Asthma-COPD overlap syndrome [J44.89] 11/20/2024 Yes    Essential hypertension [I10] 03/28/2020 Yes     Chronic    ROD (acute kidney injury) [N17.9] 03/28/2020 Yes      Problems Resolved During this Admission:       Discharged Condition: fair    Disposition: home health     Follow Up:   Follow-up Information       No, Primary Doctor Follow up in 1 week(s).                           Patient Instructions:   No discharge procedures on file.    Significant Diagnostic Studies: Labs: BMP:   Recent Labs   Lab 02/23/25  0419 02/24/25  0243   GLU 80 77    138   K 4.7 4.8     "105   CO2 25 23   BUN 16 17   CREATININE 1.0 1.0   CALCIUM 8.8 8.8   MG 2.6 2.2   , CMP   Recent Labs   Lab 02/23/25  0419 02/24/25  0243    138   K 4.7 4.8    105   CO2 25 23   GLU 80 77   BUN 16 17   CREATININE 1.0 1.0   CALCIUM 8.8 8.8   ANIONGAP 7* 10   , CBC   Recent Labs   Lab 02/23/25  0422 02/24/25  0243   WBC 4.65 4.67   HGB 10.0* 10.6*   HCT 33.0* 34.1*    263   , INR   Lab Results   Component Value Date    INR 0.9 03/28/2020   , Lipid Panel   Lab Results   Component Value Date    CHOL 165 03/21/2024    HDL 59 03/21/2024    LDLCALC 96 03/21/2024    TRIG 52 03/21/2024    CHOLHDL 2.80 03/21/2024   , Troponin No results for input(s): "TROPONINI" in the last 168 hours., A1C:   Recent Labs   Lab 02/23/25  0419   HGBA1C 5.2   , and All labs within the past 24 hours have been reviewed  Microbiology: Blood Culture No results found for: "LABBLOO", Sputum Culture No results found for: "GSRESP", "RESPIRATORYC", and Urine Culture    Lab Results   Component Value Date    LABURIN  02/22/2025     Multiple organisms isolated. None in predominance.  Repeat if    LABURIN clinically necessary. 02/22/2025       US Lower Extremity Veins Bilateral  Narrative: EXAMINATION:  US LOWER EXTREMITY VEINS BILATERAL    CLINICAL HISTORY:  r/o DVT;    TECHNIQUE:  Duplex and color flow Doppler and dynamic compression was performed of the bilateral lower extremity veins was performed.    COMPARISON:  None    FINDINGS:  Right thigh veins: The common femoral, femoral, popliteal, upper greater saphenous, and deep femoral veins are patent and free of thrombus. The veins are normally compressible and have normal phasic flow and augmentation response.    Right calf veins: The visualized calf veins are patent.    Left thigh veins: The common femoral, femoral, popliteal, upper greater saphenous, and deep femoral veins are patent and free of thrombus. The veins are normally compressible and have normal phasic flow and augmentation " response.    Left calf veins: The visualized calf veins are patent.    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  Impression: No evidence of deep venous thrombosis in either lower extremity.    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    Electronically signed by: Charles Oleary MD  Date:    02/24/2025  Time:    11:16  Echo    Left Ventricle: The left ventricle is normal in size. Ventricular mass   is normal. Normal wall thickness. Regional wall motion abnormalities   present. See diagram for wall motion findings. Septal motion is abnormal.   There is normal systolic function with a visually estimated ejection   fraction of 60 - 65%. Ejection fraction is approximately 63%. Quantitated   ejection fraction is 64%. There is indeterminate diastolic function.    Right Ventricle: Normal right ventricular cavity size. Wall thickness   is normal. Systolic function is normal.    Left Atrium: Normal left atrial size.    Right Atrium: Normal right atrial size.    Aortic Valve: There is mild aortic valve sclerosis. Aortic valve peak   velocity is 1.5 m/s. Mean gradient is 4 mmHg.    Mitral Valve: There is bileaflet sclerosis. There is normal leaflet   mobility.    Tricuspid Valve: The tricuspid valve is structurally normal. There is   mild regurgitation.    Pulmonic Valve: The pulmonic valve is structurally normal. There is   mild regurgitation.    Pulmonary Artery: There is mild to moderate pulmonary hypertension. The   estimated pulmonary artery systolic pressure is 49 mmHg.    IVC/SVC: Normal venous pressure at 3 mmHg.    Pericardium: There is no pericardial effusion.  US Retroperitoneal Complete  Narrative: EXAMINATION:  US RETROPERITONEAL COMPLETE    CLINICAL HISTORY:  ROD;    TECHNIQUE:  Ultrasound of the kidneys and  urinary bladder was performed including color flow and Doppler evaluation of the kidneys.    COMPARISON:  CT abdomen pelvis 11/20/2024.    FINDINGS:  Evaluation limited due to patient cooperation.    Right kidney: The right kidney measures 8.8 cm. Cortical scarring versus lobular contour without overall cortical thinning.  Mildly diminished perfusion.  Resistive index measures 0.80.  No mass. No renal stone. No hydronephrosis.    Left kidney: The left kidney measures 7.1 cm. Cortical scarring versus lobular contour without overall cortical thinning.  Mildly diminished perfusion.  Resistive index measures 0.85.  1.3 cm simple cyst at the upper pole.  No solid mass.  No renal stone. No hydronephrosis.    The bladder is under distended, limiting evaluation.    Splenic resistive index measures 0.69.  Impression: Bilateral medical renal disease.  No hydronephrosis.    Simple left renal cyst.    Electronically signed by resident: Chapito Salgado  Date:    02/24/2025  Time:    05:09    Electronically signed by: Charles Oleary MD  Date:    02/24/2025  Time:    07:29  X-Ray Chest AP Portable  Narrative: EXAMINATION:  XR CHEST AP PORTABLE    CLINICAL HISTORY:  increased work of breathing;    TECHNIQUE:  Single frontal view of the chest was performed.    COMPARISON:  Chest radiograph February 22, 2025    FINDINGS:  Single portable chest radiograph is submitted.  When accounting for position and technique and depth of inspiration the appearance of the cardiomediastinal silhouette is stable.  Aortic atherosclerotic change and tortuosity appears stable.    Mild chronic and atelectatic appearing changes are noted, there is no evidence for superimposed confluent infiltrate or consolidation, significant pleural effusion or pneumothorax.    The visualized osseous structures appear intact, chronic change noted.  Impression: Chronic and atelectatic appearing change, there is no radiographic evidence for superimposed acute intrathoracic  process.    Electronically signed by: Miles Hawley  Date:    02/24/2025  Time:    05:54       Pending Diagnostic Studies:       None           Medications:  Reconciled Home Medications:      Medication List        START taking these medications      amLODIPine 10 MG tablet  Commonly known as: NORVASC  Take 1 tablet (10 mg total) by mouth once daily.  Start taking on: February 25, 2025     hydrALAZINE 25 MG tablet  Commonly known as: APRESOLINE  Take 1 tablet (25 mg total) by mouth every 12 (twelve) hours.            CONTINUE taking these medications      albuterol 90 mcg/actuation inhaler  Commonly known as: PROVENTIL/VENTOLIN HFA  Inhale 2 puffs into the lungs every 6 (six) hours as needed. Rescue     albuterol-ipratropium 2.5 mg-0.5 mg/3 mL nebulizer solution  Commonly known as: DUO-NEB  use 1 vial by nebulization every 4 (four) hours as needed for Wheezing or Shortness of Breath. Rescue     ARICEPT 5 mg tablet  Generic drug: donepeziL  Take 5 mg by mouth every evening.     atorvastatin 10 MG tablet  Commonly known as: LIPITOR  Take 10 mg by mouth once daily.     BREZTRI AEROSPHERE 160-9-4.8 mcg/actuation Hfaa  Generic drug: budesonide-glycopyr-formoterol  Inhale 2 puffs into the lungs 2 (two) times a day.            STOP taking these medications      amlodipine-benazepril 10-20mg 10-20 mg per capsule  Commonly known as: LOTREL     benzonatate 100 MG capsule  Commonly known as: TESSALON     doxycycline 100 MG capsule  Commonly known as: MONODOX     promethazine 6.25 mg/5 mL syrup  Commonly known as: PHENERGAN              Indwelling Lines/Drains at time of discharge:   Lines/Drains/Airways       None                40 minutes of time spent on discharge, including examining the patient, providing discharge instructions, arranging   follow up and documentation        Richar Truong MD  Attending Staff Physician  [facility] Medicine  pager- [phone]  [employer] - [phone]

## 2025-02-24 NOTE — ASSESSMENT & PLAN NOTE
- afebrile without leukocytosis  - continue IV rocephin 1g q24hrs  - follow urine cx  2/24  UC - Multiple organisms isolated. None in predominance.  Ceftriaxone discontinued. Echo pending.  PT/OT consulted

## 2025-02-24 NOTE — ASSESSMENT & PLAN NOTE
- likely due to dehydration and electrolyte abnormalities, UTI likely contributing   - will check CTH given patient is a poor historian  - CXR, UA pending  - s/p 500cc IVFs  - echo ordered  - replace lytes  - monitor tele  - neuro checks q4hr  2/23 Syncope - likely due to dehydration and electrolyte abnormalities. CTHead -  No acute intracranial abnormality detected.  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 CXR WNL , UA+, UC pending. continue IV ceftriaxone.  s/p 500cc IVFs.  echo orderedneuro checks q4hr. calcium improved to 8.8  after given 1g IV Ca gluconate in the ED. continue telemetry  2/24   DVT sonogram - No evidence of deep venous thrombosis in either lower extremity and D dimer 0.73.   Echo -  Left Ventricle: The left ventricle is normal in size. Ventricular mass is normal. Normal wall thickness. Regional wall motion abnormalities present. See diagram for wall motion findings. Septal motion is abnormal. There is normal systolic function with a visually estimated ejection fraction of 60 - 65%. Ejection fraction is approximately 63%. Quantitated ejection fraction is 64%. There is indeterminate diastolic function.    Right Ventricle: Normal right ventricular cavity size. Wall thickness is normal. Systolic function is normal.    Left Atrium: Normal left atrial size.    Right Atrium: Normal right atrial size.    Aortic Valve: There is mild aortic valve sclerosis. Aortic valve peak velocity is 1.5 m/s. Mean gradient is 4 mmHg.    Mitral Valve: There is bileaflet sclerosis. There is normal leaflet mobility.    Tricuspid Valve: The tricuspid valve is structurally normal. There is mild regurgitation.    Pulmonic Valve: The pulmonic valve is structurally normal. There is mild regurgitation.    Pulmonary Artery: There is mild to moderate pulmonary hypertension. The estimated pulmonary artery systolic pressure is 49 mmHg.    IVC/SVC: Normal venous pressure at 3 mmHg.     Pericardium: There is no pericardial effusion. PT/OT consulted

## 2025-02-24 NOTE — ASSESSMENT & PLAN NOTE
Patient has Abnormal Magnesium: hypomagnesemia. Will continue to monitor electrolytes closely. Will replace the affected electrolytes and repeat labs to be done after interventions completed. The patient's magnesium results have been reviewed and are listed below.  Recent Labs   Lab 02/24/25  0243   MG 2.2

## 2025-02-24 NOTE — PT/OT/SLP EVAL
"Occupational Therapy   Co-Evaluation    Name: Gloria Garcia  MRN: [id number]  Admitting Diagnosis: Syncope  Recent Surgery: * No surgery found *      Recommendations:     Discharge Recommendations: Moderate Intensity Therapy  Discharge Equipment Recommendations:  shower chair  Barriers to discharge:  None    Assessment:     Gloria Garcia is a 89 y.o. female with a medical diagnosis of Syncope.  She presents with increased dizziness with all upright activities, unclear if this is baseline. Pt answers all PLOF & DME questions however questionable historian. ADLs, ADL t/fs, and fxl mobility all limited by dizziness and performance deficits. Performance deficits affecting function: weakness, impaired self care skills, impaired functional mobility, impaired balance, decreased safety awareness.      Rehab Prognosis: Good; patient would benefit from acute skilled OT services to address these deficits and reach maximum level of function.       Co-evaluation/treatment performed due to patient's multiple deficits requiring two skilled therapists to safely assess patient's ability to complete ADLs and functional mobility in addition to accommodating for patient's activity tolerance while in acute care setting.    Plan:     Patient to be seen 4 x/week to address the above listed problems via self-care/home management, community/work re-entry, therapeutic activities, therapeutic exercises, neuromuscular re-education  Plan of Care Expires:    Plan of Care Reviewed with: patient    Subjective     Chief Complaint: "I'm dizzy"  Patient/Family Comments/goals: return home     Occupational Profile:  Pt lives with their niece and sister and resides in a [place] home. Pt reports x1 NETTIE but states a ramp is available. Pt has access to a tub shower. Per CM's discussion with niece pt was indep with ADLs though pt states her niece assists her with bathing and dressing. Niece drives. Unclear if pt is able to complete other " IADLs.  Equipment Used at Home: walker, rolling  Assistance upon Discharge: Niece, unknown amount of time    Pain/Comfort:  Pain Rating 1: 0/10    Patients cultural, spiritual, Anabaptist conflicts given the current situation: no    Objective:     Communicated with: RN prior to session.  Patient found supine with telemetry, peripheral IV, PureWick upon OT entry to room.    General Precautions: Standard, fall  Orthopedic Precautions: N/A  Braces: N/A  Respiratory Status: Room air    Occupational Performance:    Bed Mobility:    Patient completed Supine to Sit with stand by assistance  Patient completed Sit to Supine with stand by assistance    Functional Mobility/Transfers:  Patient completed Sit <> Stand Transfer x2 trials with contact guard assistance with  rolling walker   Functional Mobility: Pt completed x2 side steps to L with CGA using 2ww. All other fxl mobility deferred as pt with increased dizziness in standing.     Activities of Daily Living:  Upper Body Dressing: minimum assistance to don gown posteriorly at EOB, potentially due to novelty of situation   Lower Body Dressing: stand by assistance don/doff socks at EOB     Cognitive/Visual Perceptual:  Cognitive/Psychosocial Skills:  -       Oriented to: Person, Place, and Situation   -       Follows Commands/attention:Follows one-step commands  -       Safety awareness/insight to disability: intact   -       Mood/Affect/Coping skills/emotional control: Appropriate to situation and Pleasant    Physical Exam:  Balance: -       good static & dynamic sitting balance at EOB. Good standing balance at EOB with use of 2ww however unable to assess further due to dizziness.   Upper Extremity Range of Motion:     -       Right Upper Extremity: WFL  -       Left Upper Extremity: WFL  Upper Extremity Strength:    -       Right Upper Extremity: WFL  -       Left Upper Extremity: WFL   Strength:    -       Right Upper Extremity: WFL  -       Left Upper Extremity:  WFL    [facility] 6 Click ADL:  AMPAC Total Score: 21    Treatment & Education:  VSS throughout session. Pt engaged well, endorsed increased dizziness upon sitting EOB though states this improves w/in x2 minutes. Pt again endorses increased dizziness upon standing w/ 2ww, though this does not rapidly improve upon returning to seated position. Attempt to measure BP while seated, however unable to due to equipment failure. Pt returned to supine where BP found to be 141/78. Dizziness improving after several minutes in supine. Pt educated on: OT POC, safety w/ ADLs & fxl mobility, general dc plan, and activities OOB. Pt verbalized understanding though would benefit from f/u due to cognitive deficits.     Patient left supine with all lines intact, call button in reach, and RN notified    GOALS:   Multidisciplinary Problems       Occupational Therapy Goals          Problem: Occupational Therapy    Goal Priority Disciplines Outcome Interventions   Occupational Therapy Goal     OT, PT/OT Progressing    Description: Goals to be met by: 3/26/25     Patient will increase functional independence with ADLs by performing:    UE Dressing with [place].  LE Dressing with Modified [place].  Grooming while standing at sink with Modified [place].  Toileting from toilet with Modified [place] for hygiene and clothing management.   Toilet transfer to toilet with Modified [place].                  History:     Past Medical History:   Diagnosis Date    Asthma     COPD exacerbation 7/9/2024    Hypertension          Past Surgical History:   Procedure Laterality Date    HYSTERECTOMY         Time Tracking:     OT Date of Treatment: 02/24/25  OT Start Time: 0917  OT Stop Time: 0940  OT Total Time (min): 23 min    Billable Minutes:Evaluation 10  Self Care/Home Management 13    2/24/2025

## 2025-02-24 NOTE — ASSESSMENT & PLAN NOTE
ROD is likely due to pre-renal azotemia due to intravascular volume depletion. Baseline creatinine is  0.7 . Most recent creatinine and eGFR are listed below.  Recent Labs     02/22/25  1720 02/23/25  0419 02/24/25  0243   CREATININE 0.7 1.0 1.0   EGFRNORACEVR >60.0 54.5* 54.5*      Plan    - Avoid nephrotoxins and renally dose meds for GFR listed above  - Monitor urine output, serial BMP, and adjust therapy as needed  - renal sonogram and CPK levels   monitor on lisinopril   2/24 Lisinopril discontinued. renal sonogram - Bilateral medical renal disease.  No hydronephrosis. Simple left renal cyst. started on hydralazine 25mg q 12h  for elevated BP.

## 2025-02-24 NOTE — PLAN OF CARE
Problem: Infection  Goal: Absence of Infection Signs and Symptoms  Outcome: Progressing     Problem: Adult Inpatient Plan of Care  Goal: Plan of Care Review  Outcome: Progressing  Goal: Patient-Specific Goal (Individualized)  Outcome: Progressing  Goal: Absence of Hospital-Acquired Illness or Injury  Outcome: Progressing  Goal: Optimal Comfort and Wellbeing  Outcome: Progressing  Goal: Readiness for Transition of Care  Outcome: Progressing     Problem: Fall Injury Risk  Goal: Absence of Fall and Fall-Related Injury  Outcome: Progressing     Problem: Skin Injury Risk Increased  Goal: Skin Health and Integrity  Outcome: Progressing     Problem: Acute Kidney Injury/Impairment  Goal: Fluid and Electrolyte Balance  Outcome: Progressing  Goal: Improved Oral Intake  Outcome: Progressing  Goal: Effective Renal Function  Outcome: Progressing

## 2025-02-24 NOTE — PLAN OF CARE
PT evaluation complete - see note for details. POC and goals established.    Problem: Physical Therapy  Goal: Physical Therapy Goal  Description: Goals to be met by: 3/26/25     Patient will increase functional independence with mobility by performin. Sit to stand transfer with Supervision  2. Bed to chair transfer with Supervision using Rolling Walker  3. Gait  x 150 feet with Supervision using Rolling Walker.     Patient demonstrates a mobility limitation that significantly impairs their ability to participate in one or more mobility related activities of daily living. Patient's mobility limitation cannot be sufficiently resolved with the use of a cane, but can be sufficiently resolved with the use of a rolling walker.The use of a rolling walker will considerably improve their ability to participate in MRADLs. Patient will use the walker on a regular basis at home.    Outcome: Progressing     2025

## 2025-02-24 NOTE — PLAN OF CARE
Pt went down for us abd.  Navi well.  Stated short of breath  sats in 90,s with hr 100 bp cont to be  increased  pr resting well will cont with care       Problem: Infection  Goal: Absence of Infection Signs and Symptoms  Outcome: Progressing     Problem: Adult Inpatient Plan of Care  Goal: Plan of Care Review  Outcome: Progressing  Goal: Patient-Specific Goal (Individualized)  Outcome: Progressing  Goal: Absence of Hospital-Acquired Illness or Injury  Outcome: Progressing  Goal: Optimal Comfort and Wellbeing  Outcome: Progressing  Goal: Readiness for Transition of Care  Outcome: Progressing     Problem: Fall Injury Risk  Goal: Absence of Fall and Fall-Related Injury  Outcome: Progressing     Problem: Skin Injury Risk Increased  Goal: Skin Health and Integrity  Outcome: Progressing     Problem: Acute Kidney Injury/Impairment  Goal: Fluid and Electrolyte Balance  Outcome: Progressing  Goal: Improved Oral Intake  Outcome: Progressing  Goal: Effective Renal Function  Outcome: Progressing

## 2025-02-25 PROBLEM — R53.81 PHYSICAL DECONDITIONING: Status: ACTIVE | Noted: 2025-02-25

## 2025-02-25 LAB
ANION GAP SERPL CALC-SCNC: 8 MMOL/L (ref 8–16)
BASOPHILS # BLD AUTO: 0.03 K/UL (ref 0–0.2)
BASOPHILS NFR BLD: 0.8 % (ref 0–1.9)
BUN SERPL-MCNC: 15 MG/DL (ref 8–23)
CALCIUM SERPL-MCNC: 9 MG/DL (ref 8.7–10.5)
CHLORIDE SERPL-SCNC: 104 MMOL/L (ref 95–110)
CO2 SERPL-SCNC: 25 MMOL/L (ref 23–29)
CREAT SERPL-MCNC: 1.2 MG/DL (ref 0.5–1.4)
DIFFERENTIAL METHOD BLD: ABNORMAL
EOSINOPHIL # BLD AUTO: 0.2 K/UL (ref 0–0.5)
EOSINOPHIL NFR BLD: 3.9 % (ref 0–8)
ERYTHROCYTE [DISTWIDTH] IN BLOOD BY AUTOMATED COUNT: 13.4 % (ref 11.5–14.5)
EST. GFR  (NO RACE VARIABLE): 43.8 ML/MIN/1.73 M^2
GLUCOSE SERPL-MCNC: 84 MG/DL (ref 70–110)
HCT VFR BLD AUTO: 34 % (ref 37–48.5)
HGB BLD-MCNC: 10.8 G/DL (ref 12–16)
IMM GRANULOCYTES # BLD AUTO: 0.01 K/UL (ref 0–0.04)
IMM GRANULOCYTES NFR BLD AUTO: 0.3 % (ref 0–0.5)
LYMPHOCYTES # BLD AUTO: 1.2 K/UL (ref 1–4.8)
LYMPHOCYTES NFR BLD: 32 % (ref 18–48)
MAGNESIUM SERPL-MCNC: 2.2 MG/DL (ref 1.6–2.6)
MCH RBC QN AUTO: 27.3 PG (ref 27–31)
MCHC RBC AUTO-ENTMCNC: 31.8 G/DL (ref 32–36)
MCV RBC AUTO: 86 FL (ref 82–98)
MONOCYTES # BLD AUTO: 0.5 K/UL (ref 0.3–1)
MONOCYTES NFR BLD: 11.9 % (ref 4–15)
NEUTROPHILS # BLD AUTO: 2 K/UL (ref 1.8–7.7)
NEUTROPHILS NFR BLD: 51.1 % (ref 38–73)
NRBC BLD-RTO: 0 /100 WBC
PHOSPHATE SERPL-MCNC: 4.2 MG/DL (ref 2.7–4.5)
PLATELET # BLD AUTO: 277 K/UL (ref 150–450)
PMV BLD AUTO: 10.8 FL (ref 9.2–12.9)
POCT GLUCOSE: 113 MG/DL (ref 70–110)
POCT GLUCOSE: 85 MG/DL (ref 70–110)
POCT GLUCOSE: 85 MG/DL (ref 70–110)
POCT GLUCOSE: 88 MG/DL (ref 70–110)
POTASSIUM SERPL-SCNC: 4.8 MMOL/L (ref 3.5–5.1)
RBC # BLD AUTO: 3.95 M/UL (ref 4–5.4)
SODIUM SERPL-SCNC: 137 MMOL/L (ref 136–145)
WBC # BLD AUTO: 3.87 K/UL (ref 3.9–12.7)

## 2025-02-25 PROCEDURE — 20600001 HC STEP DOWN PRIVATE ROOM

## 2025-02-25 PROCEDURE — 63600175 PHARM REV CODE 636 W HCPCS: Performed by: PHYSICIAN ASSISTANT

## 2025-02-25 PROCEDURE — 94761 N-INVAS EAR/PLS OXIMETRY MLT: CPT

## 2025-02-25 PROCEDURE — 85025 COMPLETE CBC W/AUTO DIFF WBC: CPT | Performed by: PHYSICIAN ASSISTANT

## 2025-02-25 PROCEDURE — 25000003 PHARM REV CODE 250: Performed by: HOSPITALIST

## 2025-02-25 PROCEDURE — 25000003 PHARM REV CODE 250: Performed by: PHYSICIAN ASSISTANT

## 2025-02-25 PROCEDURE — 36415 COLL VENOUS BLD VENIPUNCTURE: CPT | Performed by: PHYSICIAN ASSISTANT

## 2025-02-25 PROCEDURE — 93010 ELECTROCARDIOGRAM REPORT: CPT | Mod: ,,, | Performed by: INTERNAL MEDICINE

## 2025-02-25 PROCEDURE — 93005 ELECTROCARDIOGRAM TRACING: CPT

## 2025-02-25 PROCEDURE — 80048 BASIC METABOLIC PNL TOTAL CA: CPT | Performed by: PHYSICIAN ASSISTANT

## 2025-02-25 PROCEDURE — 83735 ASSAY OF MAGNESIUM: CPT | Performed by: PHYSICIAN ASSISTANT

## 2025-02-25 PROCEDURE — 84100 ASSAY OF PHOSPHORUS: CPT | Performed by: PHYSICIAN ASSISTANT

## 2025-02-25 RX ORDER — HYDRALAZINE HYDROCHLORIDE 10 MG/1
10 TABLET, FILM COATED ORAL EVERY 6 HOURS
Status: DISCONTINUED | OUTPATIENT
Start: 2025-02-25 | End: 2025-02-26

## 2025-02-25 RX ORDER — HYDRALAZINE HYDROCHLORIDE 10 MG/1
10 TABLET, FILM COATED ORAL EVERY 12 HOURS
Status: DISCONTINUED | OUTPATIENT
Start: 2025-02-25 | End: 2025-02-25

## 2025-02-25 RX ORDER — AMLODIPINE BESYLATE 10 MG/1
10 TABLET ORAL NIGHTLY
Status: DISCONTINUED | OUTPATIENT
Start: 2025-02-26 | End: 2025-02-26

## 2025-02-25 RX ADMIN — HEPARIN SODIUM 5000 UNITS: 5000 INJECTION INTRAVENOUS; SUBCUTANEOUS at 08:02

## 2025-02-25 RX ADMIN — ATORVASTATIN CALCIUM 10 MG: 10 TABLET, FILM COATED ORAL at 09:02

## 2025-02-25 RX ADMIN — HEPARIN SODIUM 5000 UNITS: 5000 INJECTION INTRAVENOUS; SUBCUTANEOUS at 09:02

## 2025-02-25 RX ADMIN — AMLODIPINE BESYLATE 10 MG: 10 TABLET ORAL at 08:02

## 2025-02-25 RX ADMIN — HYDRALAZINE HYDROCHLORIDE 10 MG: 10 TABLET, FILM COATED ORAL at 05:02

## 2025-02-25 RX ADMIN — HYDRALAZINE HYDROCHLORIDE 25 MG: 25 TABLET ORAL at 08:02

## 2025-02-25 NOTE — ASSESSMENT & PLAN NOTE
Patient's with Normocytic anemia.. Hemoglobin stable. Etiology likely due to chronic disease .  Current CBC reviewed-    Recent Labs   Lab 02/23/25  0422 02/24/25  0243 02/25/25  0642   HGB 10.0* 10.6* 10.8*         Component Value Date/Time    MCV 86 02/25/2025 0642    RDW 13.4 02/25/2025 0642    FERRITIN 22 03/28/2020 2325     Monitor CBC and transfuse if H/H drops below 7/21.

## 2025-02-25 NOTE — ASSESSMENT & PLAN NOTE
ROD is likely due to pre-renal azotemia due to intravascular volume depletion. Baseline creatinine is  0.7 . Most recent creatinine and eGFR are listed below.  Recent Labs     02/23/25  0419 02/24/25  0243 02/25/25  0642   CREATININE 1.0 1.0 1.2   EGFRNORACEVR 54.5* 54.5* 43.8*      Plan    - Avoid nephrotoxins and renally dose meds for GFR listed above  - Monitor urine output, serial BMP, and adjust therapy as needed  - renal sonogram and CPK levels   monitor on lisinopril   2/24 Lisinopril discontinued. renal sonogram - Bilateral medical renal disease.  No hydronephrosis. Simple left renal cyst. started on hydralazine 25mg q 12h  for elevated BP.

## 2025-02-25 NOTE — PLAN OF CARE
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       Follow Up Appointments:  No future appointments.    Allergies:Review of patient's allergies indicates:  No Known Allergies    Medications: Review discharge medications with patient and family and provide education.    Current Medications[1]     Medication List        START taking these medications      amLODIPine 10 MG tablet  Commonly known as: NORVASC  Take 1 tablet (10 mg total) by mouth once daily.  Start taking on: February 25, 2025     hydrALAZINE 25 MG tablet  Commonly known as: APRESOLINE  Take 1 tablet (25 mg total) by mouth every 12 (twelve) hours.            CONTINUE taking these medications      albuterol 90 mcg/actuation inhaler  Commonly known as: PROVENTIL/VENTOLIN HFA  Inhale 2 puffs into the lungs every 6 (six) hours as needed. Rescue     albuterol-ipratropium 2.5 mg-0.5 mg/3 mL nebulizer solution  Commonly known as: DUO-NEB  use 1 vial by nebulization every 4 (four) hours as needed for Wheezing or Shortness of Breath. Rescue     ARICEPT 5 mg tablet  Generic drug: donepeziL  Take 5 mg by mouth every evening.     atorvastatin 10 MG tablet  Commonly known as: LIPITOR  Take 10 mg by mouth once daily.     BREZTRI AEROSPHERE 160-9-4.8 mcg/actuation Hfaa  Generic drug:  budesonide-glycopyr-formoterol  Inhale 2 puffs into the lungs 2 (two) times a day.            STOP taking these medications      amlodipine-benazepril 10-20mg 10-20 mg per capsule  Commonly known as: LOTREL     benzonatate 100 MG capsule  Commonly known as: TESSALON     doxycycline 100 MG capsule  Commonly known as: MONODOX     promethazine 6.25 mg/5 mL syrup  Commonly known as: PHENERGAN                I have seen and examined this patient within the last 30 days. My clinical findings that support the need for the home health skilled services and home bound status are the following:no   Weakness/numbness causing balance and gait disturbance due to Weakness/Debility making it taxing to leave home.     Diet:   2 gram sodium diet        Referrals/ Consults  Physical Therapy to evaluate and treat. Evaluate for home safety and equipment needs; Establish/upgrade home exercise program. Perform / instruct on therapeutic exercises, gait training, transfer training, and Range of Motion.  Occupational Therapy to evaluate and treat. Evaluate home environment for safety and equipment needs. Perform/Instruct on transfers, ADL training, ROM, and therapeutic exercises.  Aide to provide assistance with personal care, ADLs, and vital signs.    Activities:   activity as tolerated    Nursing:   Agency to admit patient within 24 hours of hospital discharge unless specified on physician order or at patient request    SN to complete comprehensive assessment including routine vital signs. Instruct on disease process and s/s of complications to report to MD. Review/verify medication list sent home with the patient at time of discharge  and instruct patient/caregiver as needed. Frequency may be adjusted depending on start of care date.     Skilled nurse to perform up to 3 visits PRN for symptoms related to diagnosis    Notify MD if SBP > 160 or < 90; DBP > 90 or < 50; HR > 120 or < 50; Temp > 101; O2 < 88%;     Ok to schedule additional  visits based on staff availability and patient request on consecutive days within the home health episode.    When multiple disciplines ordered:    Start of Care occurs on Sunday - Wednesday schedule remaining discipline evaluations as ordered on separate consecutive days following the start of care.    Thursday SOC -schedule subsequent evaluations Friday and Monday the following week.     Friday - Saturday SOC - schedule subsequent discipline evaluations on consecutive days starting Monday of the following week.    For all post-discharge communication and subsequent orders please contact patient's primary care physician. If unable to reach primary care physician or do not receive response within 30 minutes, please contact [phone]  for clinical staff order clarification    Miscellaneous   Routine Skin for Bedridden Patients: Instruct patient/caregiver to apply moisture barrier cream to all skin folds and wet areas in perineal area daily and after baths and all bowel movements.    Home Health Aide:  Nursing every 48 hours, Physical Therapy every 48 hours, Occupational Therapy every 48 hours, and Home Health Aide every 48 hours    Wound Care Orders  no    I certify that this patient is confined to her home and needs intermittent skilled nursing care, physical therapy, and occupational therapy.              [1]   Current Facility-Administered Medications   Medication Dose Route Frequency Provider Last Rate Last Admin    acetaminophen tablet 650 mg  650 mg Oral Q4H PRN Verona Shepard PA-C        albuterol-ipratropium 2.5 mg-0.5 mg/3 mL nebulizer solution 3 mL  3 mL Nebulization Q4H PRN Verona Shepard PA-C        amLODIPine tablet 10 mg  10 mg Oral Daily Verona Shepard PA-C   10 mg at 02/24/25 0851    atorvastatin tablet 10 mg  10 mg Oral QHS Verona Shepard PA-ARNIE   10 mg at 02/23/25 2034    dextrose 50% injection 12.5 g  12.5 g Intravenous PRN Verona Shepard PA-C        dextrose 50%  injection 25 g  25 g Intravenous PRN Verona Shepard PA-C        glucagon (human recombinant) injection 1 mg  1 mg Intramuscular PRN Verona Shepard PA-ARNIE        glucose chewable tablet 16 g  16 g Oral PRN Verona Shepard PA-C        glucose chewable tablet 24 g  24 g Oral PRN Verona Shepard PA-C        heparin (porcine) injection 5,000 Units  5,000 Units Subcutaneous Q12H Verona Shepard PA-C   5,000 Units at 02/24/25 0851    hydrALAZINE tablet 25 mg  25 mg Oral Q12H Richar Truong MD   25 mg at 02/24/25 0851    melatonin tablet 6 mg  6 mg Oral Nightly PRN Bahman Calvillo MD        polyethylene glycol packet 17 g  17 g Oral Daily PRN Verona Shepard PA-C        sodium chloride 0.9% flush 10 mL  10 mL Intravenous PRN Bahman Calvillo MD

## 2025-02-25 NOTE — PLAN OF CARE
Pt rested quietly  no distress noted  no changes overnight will cont with care       Problem: Infection  Goal: Absence of Infection Signs and Symptoms  Outcome: Progressing     Problem: Adult Inpatient Plan of Care  Goal: Plan of Care Review  Outcome: Progressing  Goal: Patient-Specific Goal (Individualized)  Outcome: Progressing  Goal: Absence of Hospital-Acquired Illness or Injury  Outcome: Progressing  Goal: Optimal Comfort and Wellbeing  Outcome: Progressing  Goal: Readiness for Transition of Care  Outcome: Progressing     Problem: Fall Injury Risk  Goal: Absence of Fall and Fall-Related Injury  Outcome: Progressing     Problem: Skin Injury Risk Increased  Goal: Skin Health and Integrity  Outcome: Progressing     Problem: Acute Kidney Injury/Impairment  Goal: Fluid and Electrolyte Balance  Outcome: Progressing  Goal: Improved Oral Intake  Outcome: Progressing  Goal: Effective Renal Function  Outcome: Progressing

## 2025-02-25 NOTE — PLAN OF CARE
[facility]     Department of Hospital Medicine     [address] [place] [address]     [phone] after hours  [phone] fax       NURSING HOME ORDERS    Patient Name: Gloria Garcia  YOB: 1935    02/26/2025    Admit to Nursing Home:      Skilled Bed                                              Diagnoses:  Active Hospital Problems    Diagnosis  POA    *Syncope [R55]  Yes    Orthostatic hypotension [I95.1]  Yes    Physical deconditioning [R53.81]  Yes    Anemia [D64.9]  Yes    Hypokalemia [E87.6]  Yes    Hypomagnesemia [E83.42]  Yes    Acute cystitis without hematuria [N30.00]  Yes    Hypocalcemia [E83.51]  Yes    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       Patient is homebound due to:  Syncope    Allergies:Review of patient's allergies indicates:  No Known Allergies    Vitals     Every shift (Skilled Nursing patients)    Diet: cardiac diet        Acitivities:    - Up in a chair each morning as tolerated  - Ambulate with assistance to bathroom  - Scheduled walks once each shift (every 8 hours)  - May ambulate independently  - May use walker, cane, or self-propelled wheelchair       - Weight bearing: as tolerated      LABS:  Per facility protocol    Nursing Precautions:     - Aspiration precautions:             - Total assistance with meals            -  Upright 90 degrees befor during and after meals             -  Suction at bedside          - Fall precautions per nursing home protocol   - Seizure precaution per long-term protocol   - Decubitus precautions:        -  for positioning   - Pressure reducing foam mattress   - Turn patient every two hours. Use wedge pillows to anchor patient    CONSULTS:      Physical Therapy to evaluate and treat     Occupational Therapy to  evaluate and treat    MISCELLANEOUS CARE:   Routine Skin for Bedridden Patients:  Apply moisture barrier cream to all    skin folds and wet areas in perineal area daily and after baths and                           all bowel movements.         DIABETES CARE:   NA    Medications: Discontinue all previous medication orders, if any. See new list below.       Medication List        START taking these medications      hydrALAZINE 10 MG tablet  Commonly known as: APRESOLINE  Take 1 tablet (10 mg total) by mouth every 8 (eight) hours as needed (SBP > 160 mm HG).            CONTINUE taking these medications      albuterol 90 mcg/actuation inhaler  Commonly known as: PROVENTIL/VENTOLIN HFA  Inhale 2 puffs into the lungs every 6 (six) hours as needed. Rescue     albuterol-ipratropium 2.5 mg-0.5 mg/3 mL nebulizer solution  Commonly known as: DUO-NEB  use 1 vial by nebulization every 4 (four) hours as needed for Wheezing or Shortness of Breath. Rescue     ARICEPT 5 mg tablet  Generic drug: donepeziL  Take 5 mg by mouth every evening.     atorvastatin 10 MG tablet  Commonly known as: LIPITOR  Take 10 mg by mouth once daily.     BREZTRI AEROSPHERE 160-9-4.8 mcg/actuation Hfaa  Generic drug: budesonide-glycopyr-formoterol  Inhale 2 puffs into the lungs 2 (two) times a day.            STOP taking these medications      amlodipine-benazepril 10-20mg 10-20 mg per capsule  Commonly known as: LOTREL     benzonatate 100 MG capsule  Commonly known as: TESSALON     doxycycline 100 MG capsule  Commonly known as: MONODOX     promethazine 6.25 mg/5 mL syrup  Commonly known as: PHENERGAN                 _________________________________  Richar Truong MD  02/26/2025

## 2025-02-25 NOTE — PROGRESS NOTES
Alex Reich - Acute [facility] Medicine  Progress Note    Patient Name: Gloria Garcia  MRN: [id number]  Patient Class: IP- Inpatient   Admission Date: 2/22/2025  Length of Stay: 3 days  Attending Physician: Richar Truong MD  Primary Care Provider: Hailey, Primary Doctor        Subjective     Principal Problem:Syncope        HPI:  Gloria Garcia is a 89 y.o. female with a PMHx of HTN, dementia, COPD, constipation and asthma who presents to [facility] for evaluation of syncope. Patient is a poor historian. She reports suddenly passing out while sitting down watching TV earlier today. She's unsure how long she was out for. Denies any fall or head trauma, though unreliable historian. She complained of dizziness and weakness to ED provider, however she denies these symptoms at the time of my exam. Denies any preceding lightheadedness, chest pain, or SOB earlier today. She's unsure if she took her home meds this AM. She is otherwise feeling well without any complaints of chest pain, LE swelling, headache, weakness, or SOB.    ED: hypertensive to /95, vitals otherwise stable. No leukocytosis. K 3.0, Mag 1.3, Ca 6.0 (corrected 7.3). UA infectious. Given 500cc IVFs, IV Ca gluconate, IV mag, and PO klyte    Overview/Hospital Course:  2/23 AAOX2. denies symptoms. Syncope - likely due to dehydration and electrolyte abnormalities. CTHead -  No acute intracranial abnormality detected.  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 CXR WNL , UA+, UC pending. continue IV ceftriaxone.  s/p 500cc IVFs.  echo ordered. neuro checks q4hr. calcium improved to 8.8  after given 1g IV Ca gluconate in the ED. SBP 200s improved to 140s Cr 0.7--> 1. monitor. CPK levels WNL . continue telemetry .   2/24 Lisinopril discontinued. renal sonogram - Bilateral medical renal disease.  No hydronephrosis. Simple left renal cyst. started on hydralazine 25mg q 12h  for elevated BP. UC - Multiple  organisms isolated. None in predominance.  Ceftriaxone discontinued. DVT sonogram - No evidence of deep venous thrombosis in either lower extremity and D dimer 0.73. Echo -  Left Ventricle: The left ventricle is normal in size. Ventricular mass is normal. Normal wall thickness. Regional wall motion abnormalities present. See diagram for wall motion findings. Septal motion is abnormal. There is normal systolic function with a visually estimated ejection fraction of 60 - 65%. Ejection fraction is approximately 63%. Quantitated ejection fraction is 64%. There is indeterminate diastolic function.    Right Ventricle: Normal right ventricular cavity size. Wall thickness is normal. Systolic function is normal.    Left Atrium: Normal left atrial size.    Right Atrium: Normal right atrial size.    Aortic Valve: There is mild aortic valve sclerosis. Aortic valve peak velocity is 1.5 m/s. Mean gradient is 4 mmHg.    Mitral Valve: There is bileaflet sclerosis. There is normal leaflet mobility.    Tricuspid Valve: The tricuspid valve is structurally normal. There is mild regurgitation.    Pulmonic Valve: The pulmonic valve is structurally normal. There is mild regurgitation.    Pulmonary Artery: There is mild to moderate pulmonary hypertension. The estimated pulmonary artery systolic pressure is 49 mmHg.    IVC/SVC: Normal venous pressure at 3 mmHg.    Pericardium: There is no pericardial effusion. orthostatics negative.   PT/OT consulted - recs SNF.   2/25 hydralazine decreased to 10mg QID. amlodipine 10mg switched to HS. EKG with sinus arrhythmia. Cr improved to 1.2. discussed with MITZI Miss Bailey ([phone]) needs SNF referrals         Review of Systems:   Pain scale:    Constitutional:  fever,  chills, headache, vision loss, hearing loss, weight loss, Generalized weakness, falls, loss of smell, loss of taste, poor appetite,  sore throat  Respiratory: cough, shortness of breath.   Cardiovascular: chest pain, dizziness,  palpitations, orthopnea, swelling of feet, syncope  Gastrointestinal: nausea, vomiting, abdominal pain, diarrhea, black stool,  blood in stool, change in bowel habits, constipation  Genitourinary: hematuria, dysuria, urgency, frequency  Integument/Breast: rash,  pruritis  Hematologic/Lymphatic: easy bruising, lymphadenopathy  Musculoskeletal: arthralgias , myalgias, back pain, neck pain, knee pain  Neurological: confusion, seizures, tremors, slurred speech  Behavioral/Psych:  depression, anxiety, auditory or visual hallucinations     OBJECTIVE:     Physical Exam:  Body mass index is 17.41 kg/m².    Constitutional: Appears thin built and frail  Head: Normocephalic and atraumatic.   Neck: Normal range of motion. Neck supple.   Cardiovascular: Normal heart rate.  Regular heart rhythm.  Pulmonary/Chest: Effort normal.   Abdominal: No distension.  No tenderness  Musculoskeletal: Normal range of motion. No edema.   Neurological: Alert and oriented to person, place. able to move bilateral upper and lower extremities without limitation . poor historian  Skin: Skin is warm and dry.   Psychiatric: Normal mood and affect. Behavior is normal.                  Vital Signs  Temp: 98.5 °F (36.9 °C) (02/25/25 1610)  Pulse: 68 (02/25/25 1702)  Resp: 19 (02/25/25 1702)  BP: (!) 170/76 (02/25/25 1720)  SpO2: 95 % (02/25/25 1702)     24 Hour VS Range    Temp:  [97.2 °F (36.2 °C)-99 °F (37.2 °C)]   Pulse:  [64-93]   Resp:  [19-32]   BP: (103-174)/(56-85)   SpO2:  [92 %-97 %]     Intake/Output Summary (Last 24 hours) at 2/25/2025 1822  Last data filed at 2/25/2025 1816  Gross per 24 hour   Intake 542 ml   Output --   Net 542 ml           I/O This Shift:  I/O this shift:  In: 542 [P.O.:542]  Out: -     Wt Readings from Last 3 Encounters:   02/24/25 46 kg (101 lb 6.6 oz)   02/18/25 39.3 kg (86 lb 10.3 oz)   11/20/24 43.2 kg (95 lb 3.8 oz)       I have personally reviewed the vitals and recorded Intake/Output     Laboratory/Diagnostic  "Data:    CBC/Anemia Labs: Coags:    Recent Labs   Lab 02/23/25  0422 02/24/25  0243 02/25/25  0642   WBC 4.65 4.67 3.87*   HGB 10.0* 10.6* 10.8*   HCT 33.0* 34.1* 34.0*    263 277   MCV 88 88 86   RDW 13.3 13.4 13.4    No results for input(s): "PT", "INR", "APTT" in the last 168 hours.     Chemistries: ABG:   Recent Labs   Lab 02/22/25  1720 02/23/25  0419 02/24/25  0243 02/25/25  0642    138 138 137   K 3.0* 4.7 4.8 4.8   * 106 105 104   CO2 19* 25 23 25   BUN 17 16 17 15   CREATININE 0.7 1.0 1.0 1.2   CALCIUM 6.0* 8.8 8.8 9.0   PROT 4.6*  --   --   --    BILITOT 0.3  --   --   --    ALKPHOS 43  --   --   --    ALT <5*  --   --   --    AST 23  --   --   --    MG 1.3* 2.6 2.2 2.2   PHOS  --  2.5* 3.5 4.2    No results for input(s): "PH", "PCO2", "PO2", "HCO3", "POCSATURATED", "BE" in the last 168 hours.     POCT Glucose: HbA1c:    Recent Labs   Lab 02/24/25  1212 02/24/25  1621 02/24/25  2051 02/25/25  0744 02/25/25  1203 02/25/25  1609   POCTGLUCOSE 94 94 111* 85 113* 88    Hemoglobin A1C   Date Value Ref Range Status   02/23/2025 5.2 4.0 - 5.6 % Final     Comment:     ADA Screening Guidelines:  5.7-6.4%  Consistent with prediabetes  >or=6.5%  Consistent with diabetes    High levels of fetal hemoglobin interfere with the HbA1C  assay. Heterozygous hemoglobin variants (HbS, HgC, etc)do  not significantly interfere with this assay.   However, presence of multiple variants may affect accuracy.     03/21/2024 5.4 4.7 - 5.6 % Final        Cardiac Enzymes: Ejection Fractions:    Recent Labs     02/23/25  0419   CPK 71    EF   Date Value Ref Range Status   02/24/2025 63 % Final          No results for input(s): "COLORU", "APPEARANCEUA", "PHUR", "SPECGRAV", "PROTEINUA", "GLUCUA", "KETONESU", "BILIRUBINUA", "OCCULTUA", "NITRITE", "UROBILINOGEN", "LEUKOCYTESUR", "RBCUA", "WBCUA", "BACTERIA", "SQUAMEPITHEL", "HYALINECASTS" in the last 48 hours.    Invalid input(s): "WRIGHTSUR"      Procalcitonin (ng/mL) "   Date Value   03/28/2020 0.05     Lactate (Lactic Acid) (mmol/L)   Date Value   03/28/2020 0.7     BNP (pg/mL)   Date Value   02/22/2025 83   11/20/2024 107 (H)   10/06/2024 80   07/09/2024 49   04/20/2024 31     CRP (mg/L)   Date Value   03/28/2020 40.3 (H)     Sed Rate (mm/Hr)   Date Value   03/28/2020 36 (H)     D-Dimer (mg/L FEU)   Date Value   02/24/2025 0.73 (H)   10/06/2024 0.81 (H)     Ferritin (ng/mL)   Date Value   03/28/2020 22     LD (U/L)   Date Value   03/28/2020 244     Troponin I (ng/mL)   Date Value   11/20/2024 0.019   10/06/2024 0.008   07/09/2024 0.006   04/20/2024 0.011   08/02/2022 0.007   03/30/2020 0.020   03/29/2020 0.019   03/28/2020 0.058 (H)     CPK (U/L)   Date Value   02/23/2025 71   03/28/2020 87     No results found for this or any previous visit.  SARS-CoV2 (COVID-19) Qualitative PCR (no units)   Date Value   03/29/2020 Undetected     SARS-CoV-2 RNA, Amplification, Qual (no units)   Date Value   11/21/2024 Negative   07/09/2024 Negative     POC Rapid COVID (no units)   Date Value   11/10/2024 Negative   10/06/2024 Negative   08/02/2022 Negative       Microbiology labs for the last week  Microbiology Results (last 7 days)       Procedure Component Value Units Date/Time    Urine culture [[id number]] Collected: 02/22/25 2008    Order Status: Completed Specimen: Urine Updated: 02/24/25 0616     Urine Culture, Routine Multiple organisms isolated. None in predominance.  Repeat if      clinically necessary.    Narrative:      Specimen Source->Urine            Reviewed and noted in plan where applicable- Please see chart for full lab data.    Lines/Drains:       Peripheral IV - Single Lumen 02/22/25 18 G Left Antecubital (Active)   Number of days: 1       Imaging  ECG Results              EKG 12-lead (Final result)        Collection Time Result Time QRS Duration OHS QTC Calculation    02/22/25 16:01:57 02/23/25 09:32:54 68 466                     Final result by Interface, Lab In [facility]  (02/23/25 09:33:01)                   Narrative:    Test Reason : R55,    Vent. Rate :  60 BPM     Atrial Rate :  60 BPM     P-R Int : 170 ms          QRS Dur :  68 ms      QT Int : 466 ms       P-R-T Axes : -28   1  33 degrees    QTcB Int : 466 ms    Sinus rhythm with Premature supraventricular complexes  Minimal voltage criteria for LVH, may be normal variant ( Sokolow-Piper )  Low septal forces ; Abnormal R wave progression in the precordial leads  Nonspecific T wave abnormality  Prolonged QT  Abnormal ECG  When compared with ECG of 20-Nov-2024 15:36,  Vent. rate has decreased by  36 bpm  ST elevation now present in Anterior leads  Confirmed by Steve Granados ([id number]) on 2/23/2025 9:32:52 AM    Referred By: AAAREFERRAL SELF           Confirmed By: Steve Granados                                    No results found for this or any previous visit.      US Lower Extremity Veins Bilateral  Narrative: EXAMINATION:  US LOWER EXTREMITY VEINS BILATERAL    CLINICAL HISTORY:  r/o DVT;    TECHNIQUE:  Duplex and color flow Doppler and dynamic compression was performed of the bilateral lower extremity veins was performed.    COMPARISON:  None    FINDINGS:  Right thigh veins: The common femoral, femoral, popliteal, upper greater saphenous, and deep femoral veins are patent and free of thrombus. The veins are normally compressible and have normal phasic flow and augmentation response.    Right calf veins: The visualized calf veins are patent.    Left thigh veins: The common femoral, femoral, popliteal, upper greater saphenous, and deep femoral veins are patent and free of thrombus. The veins are normally compressible and have normal phasic flow and augmentation response.    Left calf veins: The visualized calf veins are patent.    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  Impression: No evidence of deep venous thrombosis in either  lower extremity.    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    Electronically signed by: Charles Oleary MD  Date:    02/24/2025  Time:    11:16  Echo    Left Ventricle: The left ventricle is normal in size. Ventricular mass   is normal. Normal wall thickness. Regional wall motion abnormalities   present. See diagram for wall motion findings. Septal motion is abnormal.   There is normal systolic function with a visually estimated ejection   fraction of 60 - 65%. Ejection fraction is approximately 63%. Quantitated   ejection fraction is 64%. There is indeterminate diastolic function.    Right Ventricle: Normal right ventricular cavity size. Wall thickness   is normal. Systolic function is normal.    Left Atrium: Normal left atrial size.    Right Atrium: Normal right atrial size.    Aortic Valve: There is mild aortic valve sclerosis. Aortic valve peak   velocity is 1.5 m/s. Mean gradient is 4 mmHg.    Mitral Valve: There is bileaflet sclerosis. There is normal leaflet   mobility.    Tricuspid Valve: The tricuspid valve is structurally normal. There is   mild regurgitation.    Pulmonic Valve: The pulmonic valve is structurally normal. There is   mild regurgitation.    Pulmonary Artery: There is mild to moderate pulmonary hypertension. The   estimated pulmonary artery systolic pressure is 49 mmHg.    IVC/SVC: Normal venous pressure at 3 mmHg.    Pericardium: There is no pericardial effusion.  US Retroperitoneal Complete  Narrative: EXAMINATION:  US RETROPERITONEAL COMPLETE    CLINICAL HISTORY:  ROD;    TECHNIQUE:  Ultrasound of the kidneys and urinary bladder was performed including color flow and Doppler evaluation of the kidneys.    COMPARISON:  CT abdomen pelvis 11/20/2024.    FINDINGS:  Evaluation limited due to patient cooperation.    Right kidney: The right kidney measures 8.8 cm. Cortical scarring versus lobular  contour without overall cortical thinning.  Mildly diminished perfusion.  Resistive index measures 0.80.  No mass. No renal stone. No hydronephrosis.    Left kidney: The left kidney measures 7.1 cm. Cortical scarring versus lobular contour without overall cortical thinning.  Mildly diminished perfusion.  Resistive index measures 0.85.  1.3 cm simple cyst at the upper pole.  No solid mass.  No renal stone. No hydronephrosis.    The bladder is under distended, limiting evaluation.    Splenic resistive index measures 0.69.  Impression: Bilateral medical renal disease.  No hydronephrosis.    Simple left renal cyst.    Electronically signed by resident: Chapito Salgado  Date:    02/24/2025  Time:    05:09    Electronically signed by: Charles Oleary MD  Date:    02/24/2025  Time:    07:29  X-Ray Chest AP Portable  Narrative: EXAMINATION:  XR CHEST AP PORTABLE    CLINICAL HISTORY:  increased work of breathing;    TECHNIQUE:  Single frontal view of the chest was performed.    COMPARISON:  Chest radiograph February 22, 2025    FINDINGS:  Single portable chest radiograph is submitted.  When accounting for position and technique and depth of inspiration the appearance of the cardiomediastinal silhouette is stable.  Aortic atherosclerotic change and tortuosity appears stable.    Mild chronic and atelectatic appearing changes are noted, there is no evidence for superimposed confluent infiltrate or consolidation, significant pleural effusion or pneumothorax.    The visualized osseous structures appear intact, chronic change noted.  Impression: Chronic and atelectatic appearing change, there is no radiographic evidence for superimposed acute intrathoracic process.    Electronically signed by: Miles Hawley  Date:    02/24/2025  Time:    05:54      Labs, Imaging, EKG and Diagnostic results from 2/25/2025 were reviewed.    Medications:  Medication list was reviewed and changes noted under Assessment/Plan.  Medications Ordered Prior  to Encounter[1]  Scheduled Medications:  Current Facility-Administered Medications   Medication Dose Route Frequency    [START ON 2/26/2025] amLODIPine  10 mg Oral QHS    atorvastatin  10 mg Oral QHS    heparin (porcine)  5,000 Units Subcutaneous Q12H    hydrALAZINE  10 mg Oral Q6H     PRN:   Current Facility-Administered Medications:     acetaminophen, 650 mg, Oral, Q4H PRN    albuterol-ipratropium, 3 mL, Nebulization, Q4H PRN    dextrose 50%, 12.5 g, Intravenous, PRN    dextrose 50%, 25 g, Intravenous, PRN    glucagon (human recombinant), 1 mg, Intramuscular, PRN    glucose, 16 g, Oral, PRN    glucose, 24 g, Oral, PRN    melatonin, 6 mg, Oral, Nightly PRN    polyethylene glycol, 17 g, Oral, Daily PRN    sodium chloride 0.9%, 10 mL, Intravenous, PRN  Infusions:   Estimated Creatinine Clearance: 24 mL/min (based on SCr of 1.2 mg/dL).             Assessment and Plan     * Syncope  - likely due to dehydration and electrolyte abnormalities, UTI likely contributing   - will check CTH given patient is a poor historian  - CXR, UA pending  - s/p 500cc IVFs  - echo ordered  - replace lytes  - monitor tele  - neuro checks q4hr  2/23 Syncope - likely due to dehydration and electrolyte abnormalities. CTHead -  No acute intracranial abnormality detected.  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 CXR WNL , UA+, UC pending. continue IV ceftriaxone.  s/p 500cc IVFs.  echo orderedneuro checks q4hr. calcium improved to 8.8  after given 1g IV Ca gluconate in the ED. continue telemetry  2/24   DVT sonogram - No evidence of deep venous thrombosis in either lower extremity and D dimer 0.73.   Echo -  Left Ventricle: The left ventricle is normal in size. Ventricular mass is normal. Normal wall thickness. Regional wall motion abnormalities present. See diagram for wall motion findings. Septal motion is abnormal. There is normal systolic function with a visually estimated ejection fraction of 60 -  65%. Ejection fraction is approximately 63%. Quantitated ejection fraction is 64%. There is indeterminate diastolic function.    Right Ventricle: Normal right ventricular cavity size. Wall thickness is normal. Systolic function is normal.    Left Atrium: Normal left atrial size.    Right Atrium: Normal right atrial size.    Aortic Valve: There is mild aortic valve sclerosis. Aortic valve peak velocity is 1.5 m/s. Mean gradient is 4 mmHg.    Mitral Valve: There is bileaflet sclerosis. There is normal leaflet mobility.    Tricuspid Valve: The tricuspid valve is structurally normal. There is mild regurgitation.    Pulmonic Valve: The pulmonic valve is structurally normal. There is mild regurgitation.    Pulmonary Artery: There is mild to moderate pulmonary hypertension. The estimated pulmonary artery systolic pressure is 49 mmHg.    IVC/SVC: Normal venous pressure at 3 mmHg.    Pericardium: There is no pericardial effusion.     Physical deconditioning  2/25 PT recs SNF. Family (Niece and POA) agreeable to SNF. needs SNF referrals       Anemia    Patient's with Normocytic anemia.. Hemoglobin stable. Etiology likely due to chronic disease .  Current CBC reviewed-    Recent Labs   Lab 02/23/25  0422 02/24/25  0243 02/25/25  0642   HGB 10.0* 10.6* 10.8*         Component Value Date/Time    MCV 86 02/25/2025 0642    RDW 13.4 02/25/2025 0642    FERRITIN 22 03/28/2020 2325     Monitor CBC and transfuse if H/H drops below 7/21.      Hypocalcemia  - Ca 6.0, corrected for albumin is 7.3  - given 1g IV Ca gluconate in the ED  - further replacement as indicated  - trend daily labs  2/23  calcium improved to 8.8  after given 1g IV Ca gluconate in the ED.    Acute cystitis without hematuria  - afebrile without leukocytosis  - continue IV rocephin 1g q24hrs  - follow urine cx  2/24  UC - Multiple organisms isolated. None in predominance.  Ceftriaxone discontinued. Echo pending.  PT/OT consulted    Hypomagnesemia  Patient has Abnormal  Magnesium: hypomagnesemia. Will continue to monitor electrolytes closely. Will replace the affected electrolytes and repeat labs to be done after interventions completed. The patient's magnesium results have been reviewed and are listed below.  Recent Labs   Lab 02/24/25  0243   MG 2.2          Hypokalemia  - K 3.0  - replace PO  - monitor daily labs  - tele   2/23 resolved     Asthma-COPD overlap syndrome  - no acute issues  - satting 97% on RA  - duonebs PRN    Essential hypertension  - uncontrolled BP in the setting of questionable med compliance  - home benazepril not on formulary-- will sub w/ lisinopril 20mg daily with first dose tonight given unsure if pt took home meds today  - continue home amlodipine 10mg daily  2/23  SBP 200s.   Chronic, controlled.  Latest blood pressure and vitals reviewed-   Temp:  [97.2 °F (36.2 °C)-99 °F (37.2 °C)]   Pulse:  [64-93]   Resp:  [19-32]   BP: (103-174)/(56-85)   SpO2:  [92 %-97 %] .   Home meds for hypertension were reviewed and noted below. Hospital anti-hypertensive changes were made as shown below.  Hypertension Medications              [id number]   Take 1 capsule by mouth every morning.            Hydralazine - PRN meds if BP> 170/110 mm HG     2/25 hydralazine decreased to 10mg QID. amlodipine 10mg switched to HSdiscussed with POA Miss Bailey ([phone]) needs SNF referrals     ROD (acute kidney injury)  ROD is likely due to pre-renal azotemia due to intravascular volume depletion. Baseline creatinine is  0.7 . Most recent creatinine and eGFR are listed below.  Recent Labs     02/23/25  0419 02/24/25  0243 02/25/25  0642   CREATININE 1.0 1.0 1.2   EGFRNORACEVR 54.5* 54.5* 43.8*      Plan    - Avoid nephrotoxins and renally dose meds for GFR listed above  - Monitor urine output, serial BMP, and adjust therapy as needed  - renal sonogram and CPK levels   monitor on lisinopril   2/24 Lisinopril discontinued. renal sonogram - Bilateral medical renal disease.  No  hydronephrosis. Simple left renal cyst. started on hydralazine 25mg q 12h  for elevated BP.       VTE Risk Mitigation (From admission, onward)           Ordered     heparin (porcine) injection 5,000 Units  Every 12 hours         02/22/25 1913     IP VTE HIGH RISK PATIENT  Once         02/22/25 1902     IP VTE HIGH RISK PATIENT  Once         02/22/25 1902                    Discharge Planning   DOTTIE: 2/26/2025     Code Status: Full Code   Medical Readiness for Discharge Date:   Discharge Plan A: Skilled Nursing Facility   Discharge Delays: None known at this time                    Richar Truong MD  Department of Hospital Medicine   [facility] - Acute Medical Stepdown         [1]   No current facility-administered medications on file prior to encounter.     Current Outpatient Medications on File Prior to Encounter   Medication Sig Dispense Refill    albuterol (PROVENTIL/VENTOLIN HFA) 90 mcg/actuation inhaler Inhale 2 puffs into the lungs every 6 (six) hours as needed. Rescue 18 g 5    albuterol-ipratropium (DUO-NEB) 2.5 mg-0.5 mg/3 mL nebulizer solution use 1 vial by nebulization every 4 (four) hours as needed for Wheezing or Shortness of Breath. Rescue 90 mL 2    ARICEPT 5 mg tablet Take 5 mg by mouth every evening.      atorvastatin (LIPITOR) 10 MG tablet Take 10 mg by mouth once daily.      budesonide-glycopyr-formoterol (BREZTRI AEROSPHERE) 160-9-4.8 mcg/actuation HFAA Inhale 2 puffs into the lungs 2 (two) times a day. 10.7 g 5

## 2025-02-26 PROBLEM — I95.1 ORTHOSTATIC HYPOTENSION: Status: ACTIVE | Noted: 2025-02-26

## 2025-02-26 LAB
ALBUMIN SERPL BCP-MCNC: 3.3 G/DL (ref 3.5–5.2)
ALP SERPL-CCNC: 57 U/L (ref 40–150)
ALT SERPL W/O P-5'-P-CCNC: 6 U/L (ref 10–44)
ANION GAP SERPL CALC-SCNC: 9 MMOL/L (ref 8–16)
AST SERPL-CCNC: 24 U/L (ref 10–40)
BASOPHILS # BLD AUTO: 0.04 K/UL (ref 0–0.2)
BASOPHILS NFR BLD: 0.9 % (ref 0–1.9)
BILIRUB SERPL-MCNC: 0.5 MG/DL (ref 0.1–1)
BUN SERPL-MCNC: 15 MG/DL (ref 8–23)
CALCIUM SERPL-MCNC: 9.1 MG/DL (ref 8.7–10.5)
CHLORIDE SERPL-SCNC: 105 MMOL/L (ref 95–110)
CO2 SERPL-SCNC: 22 MMOL/L (ref 23–29)
CREAT SERPL-MCNC: 1.1 MG/DL (ref 0.5–1.4)
DIFFERENTIAL METHOD BLD: ABNORMAL
EOSINOPHIL # BLD AUTO: 0.1 K/UL (ref 0–0.5)
EOSINOPHIL NFR BLD: 3.2 % (ref 0–8)
ERYTHROCYTE [DISTWIDTH] IN BLOOD BY AUTOMATED COUNT: 13.6 % (ref 11.5–14.5)
EST. GFR  (NO RACE VARIABLE): 48.6 ML/MIN/1.73 M^2
GLUCOSE SERPL-MCNC: 77 MG/DL (ref 70–110)
HCT VFR BLD AUTO: 35.9 % (ref 37–48.5)
HGB BLD-MCNC: 10.9 G/DL (ref 12–16)
IMM GRANULOCYTES # BLD AUTO: 0.01 K/UL (ref 0–0.04)
IMM GRANULOCYTES NFR BLD AUTO: 0.2 % (ref 0–0.5)
LYMPHOCYTES # BLD AUTO: 1.5 K/UL (ref 1–4.8)
LYMPHOCYTES NFR BLD: 33.1 % (ref 18–48)
MAGNESIUM SERPL-MCNC: 2.1 MG/DL (ref 1.6–2.6)
MCH RBC QN AUTO: 27.1 PG (ref 27–31)
MCHC RBC AUTO-ENTMCNC: 30.4 G/DL (ref 32–36)
MCV RBC AUTO: 89 FL (ref 82–98)
MONOCYTES # BLD AUTO: 0.4 K/UL (ref 0.3–1)
MONOCYTES NFR BLD: 9.8 % (ref 4–15)
NEUTROPHILS # BLD AUTO: 2.3 K/UL (ref 1.8–7.7)
NEUTROPHILS NFR BLD: 52.8 % (ref 38–73)
NRBC BLD-RTO: 0 /100 WBC
OHS QRS DURATION: 66 MS
OHS QTC CALCULATION: 457 MS
PLATELET # BLD AUTO: 278 K/UL (ref 150–450)
PMV BLD AUTO: 10.2 FL (ref 9.2–12.9)
POCT GLUCOSE: 100 MG/DL (ref 70–110)
POCT GLUCOSE: 109 MG/DL (ref 70–110)
POCT GLUCOSE: 136 MG/DL (ref 70–110)
POCT GLUCOSE: 82 MG/DL (ref 70–110)
POTASSIUM SERPL-SCNC: 4.5 MMOL/L (ref 3.5–5.1)
PROT SERPL-MCNC: 6.5 G/DL (ref 6–8.4)
RBC # BLD AUTO: 4.02 M/UL (ref 4–5.4)
SODIUM SERPL-SCNC: 136 MMOL/L (ref 136–145)
WBC # BLD AUTO: 4.41 K/UL (ref 3.9–12.7)

## 2025-02-26 PROCEDURE — 36415 COLL VENOUS BLD VENIPUNCTURE: CPT | Performed by: HOSPITALIST

## 2025-02-26 PROCEDURE — 25000003 PHARM REV CODE 250: Performed by: EMERGENCY MEDICINE

## 2025-02-26 PROCEDURE — 97530 THERAPEUTIC ACTIVITIES: CPT

## 2025-02-26 PROCEDURE — 85025 COMPLETE CBC W/AUTO DIFF WBC: CPT | Performed by: HOSPITALIST

## 2025-02-26 PROCEDURE — 83735 ASSAY OF MAGNESIUM: CPT | Performed by: HOSPITALIST

## 2025-02-26 PROCEDURE — 25000003 PHARM REV CODE 250: Performed by: HOSPITALIST

## 2025-02-26 PROCEDURE — 20600001 HC STEP DOWN PRIVATE ROOM

## 2025-02-26 PROCEDURE — 97535 SELF CARE MNGMENT TRAINING: CPT

## 2025-02-26 PROCEDURE — 63600175 PHARM REV CODE 636 W HCPCS: Performed by: HOSPITALIST

## 2025-02-26 PROCEDURE — 63600175 PHARM REV CODE 636 W HCPCS: Performed by: PHYSICIAN ASSISTANT

## 2025-02-26 PROCEDURE — 80053 COMPREHEN METABOLIC PANEL: CPT | Performed by: HOSPITALIST

## 2025-02-26 PROCEDURE — 25000003 PHARM REV CODE 250: Performed by: PHYSICIAN ASSISTANT

## 2025-02-26 RX ORDER — SODIUM CHLORIDE, SODIUM LACTATE, POTASSIUM CHLORIDE, CALCIUM CHLORIDE 600; 310; 30; 20 MG/100ML; MG/100ML; MG/100ML; MG/100ML
INJECTION, SOLUTION INTRAVENOUS CONTINUOUS
Status: ACTIVE | OUTPATIENT
Start: 2025-02-26 | End: 2025-02-26

## 2025-02-26 RX ORDER — HYDRALAZINE HYDROCHLORIDE 10 MG/1
10 TABLET, FILM COATED ORAL EVERY 8 HOURS PRN
Qty: 90 TABLET | Refills: 1 | Status: SHIPPED | OUTPATIENT
Start: 2025-02-26 | End: 2025-02-27

## 2025-02-26 RX ORDER — HYDRALAZINE HYDROCHLORIDE 10 MG/1
10 TABLET, FILM COATED ORAL EVERY 8 HOURS
Status: DISCONTINUED | OUTPATIENT
Start: 2025-02-26 | End: 2025-02-26

## 2025-02-26 RX ORDER — AMLODIPINE BESYLATE 5 MG/1
5 TABLET ORAL NIGHTLY
Status: DISCONTINUED | OUTPATIENT
Start: 2025-02-26 | End: 2025-02-26

## 2025-02-26 RX ORDER — HYDRALAZINE HYDROCHLORIDE 10 MG/1
10 TABLET, FILM COATED ORAL EVERY 8 HOURS PRN
Status: DISCONTINUED | OUTPATIENT
Start: 2025-02-26 | End: 2025-02-27 | Stop reason: HOSPADM

## 2025-02-26 RX ADMIN — Medication 6 MG: at 09:02

## 2025-02-26 RX ADMIN — HYDRALAZINE HYDROCHLORIDE 10 MG: 10 TABLET, FILM COATED ORAL at 12:02

## 2025-02-26 RX ADMIN — ACETAMINOPHEN 650 MG: 325 TABLET ORAL at 11:02

## 2025-02-26 RX ADMIN — SODIUM CHLORIDE, POTASSIUM CHLORIDE, SODIUM LACTATE AND CALCIUM CHLORIDE: 600; 310; 30; 20 INJECTION, SOLUTION INTRAVENOUS at 01:02

## 2025-02-26 RX ADMIN — ATORVASTATIN CALCIUM 10 MG: 10 TABLET, FILM COATED ORAL at 09:02

## 2025-02-26 RX ADMIN — HEPARIN SODIUM 5000 UNITS: 5000 INJECTION INTRAVENOUS; SUBCUTANEOUS at 09:02

## 2025-02-26 NOTE — ASSESSMENT & PLAN NOTE
- uncontrolled BP in the setting of questionable med compliance  - home benazepril not on formulary-- will sub w/ lisinopril 20mg daily with first dose tonight given unsure if pt took home meds today  - continue home amlodipine 10mg daily  2/23  SBP 200s.   Chronic, controlled.  Latest blood pressure and vitals reviewed-   Temp:  [98.1 °F (36.7 °C)-100 °F (37.8 °C)]   Pulse:  [58-80]   Resp:  [20-27]   BP: (117-169)/(69-83)   SpO2:  [95 %-100 %] .   Home meds for hypertension were reviewed and noted below. Hospital anti-hypertensive changes were made as shown below.  Hypertension Medications                         Hydralazine - PRN meds if BP> 170/110 mm HG     2/25 hydralazine decreased to 10mg QID. amlodipine 10mg switched to HSdiscussed with POA Miss Bailey ([phone]) needs SNF referrals   2/26 orthostatic hypotension today. amlodipine discontinued. Gentle LR hydration. hydralazine 10mg q8h PRN for SBP > 160mm HG. pending SNF    BP better off amlodipine. Continue with prn meds

## 2025-02-26 NOTE — PLAN OF CARE
Pt AAO, VSS, no c/o pain or distress.  Pt got up with pt today and experienced dizziness, orthostatic Bps obtained by PT.  Pt BP went from 141/67 while lying to 97/53 while standing.  Dr. Truong aware and ordered LR @ 50/hr.    Bed low and locked, call light and belongings in reach, safety maintained.       Problem: Infection  Goal: Absence of Infection Signs and Symptoms  Outcome: Progressing     Problem: Adult Inpatient Plan of Care  Goal: Plan of Care Review  Outcome: Progressing  Goal: Patient-Specific Goal (Individualized)  Outcome: Progressing  Goal: Absence of Hospital-Acquired Illness or Injury  Outcome: Progressing  Goal: Optimal Comfort and Wellbeing  Outcome: Progressing  Goal: Readiness for Transition of Care  Outcome: Progressing     Problem: Fall Injury Risk  Goal: Absence of Fall and Fall-Related Injury  Outcome: Progressing     Problem: Skin Injury Risk Increased  Goal: Skin Health and Integrity  Outcome: Progressing     Problem: Acute Kidney Injury/Impairment  Goal: Fluid and Electrolyte Balance  Outcome: Progressing  Goal: Improved Oral Intake  Outcome: Progressing  Goal: Effective Renal Function  Outcome: Progressing

## 2025-02-26 NOTE — PLAN OF CARE
Shift note:    Pt AAOx4, on RA, tele- NSR. BP elevated during shift- MD aware. Orthostatic BP completed. 2G Na Soft/bite sized diet. BG checks AC/HS. EKG completed. Bed low and locked, call light in reach. No concerns voiced a this time.       Problem: Infection  Goal: Absence of Infection Signs and Symptoms  Outcome: Progressing     Problem: Adult Inpatient Plan of Care  Goal: Plan of Care Review  Outcome: Progressing  Goal: Patient-Specific Goal (Individualized)  Outcome: Progressing  Goal: Absence of Hospital-Acquired Illness or Injury  Outcome: Progressing  Goal: Optimal Comfort and Wellbeing  Outcome: Progressing  Goal: Readiness for Transition of Care  Outcome: Progressing     Problem: Fall Injury Risk  Goal: Absence of Fall and Fall-Related Injury  Outcome: Progressing     Problem: Skin Injury Risk Increased  Goal: Skin Health and Integrity  Outcome: Progressing     Problem: Acute Kidney Injury/Impairment  Goal: Fluid and Electrolyte Balance  Outcome: Progressing  Goal: Improved Oral Intake  Outcome: Progressing  Goal: Effective Renal Function  Outcome: Progressing

## 2025-02-26 NOTE — PT/OT/SLP PROGRESS
"Occupational Therapy   Treatment    Name: Gloria Garcia  MRN: [id number]  Admitting Diagnosis:  Syncope       Recommendations:     Discharge Recommendations: Moderate Intensity Therapy  Discharge Equipment Recommendations:  shower chair  Barriers to discharge:  None    Assessment:     Gloria Garcia is a 89 y.o. female with a medical diagnosis of Syncope.  She presents with increased dizziness, likely orthostatic hypotension, greatly limiting activities at EOB and OOB due to decreased safety. Pt agreeable and motivated throughout session though pleasantly confused, A&O to name and place. Pt deficits currently affecting independence with ADLs, ADL transfers, and functional mobility. Performance deficits affecting function are weakness, impaired balance, decreased safety awareness, impaired cognition, impaired self care skills, impaired functional mobility, decreased upper extremity function, decreased lower extremity function, gait instability.     Rehab Prognosis:  Fair; patient would benefit from acute skilled OT services to address these deficits and reach maximum level of function.       Plan:     Patient to be seen 4 x/week to address the above listed problems via self-care/home management, community/work re-entry, therapeutic activities, therapeutic exercises, neuromuscular re-education, sensory integration  Plan of Care Expires:  3/26/25  Plan of Care Reviewed with: patient    Subjective     Chief Complaint: "I'm dizzy"  Patient/Family Comments/goals: does not state this AM  Pain/Comfort:  Pain Rating 1: 0/10    Objective:     Communicated with: RN prior to session.  Patient found supine with blood pressure cuff, telemetry, peripheral IV upon OT entry to room.    General Precautions: Standard, fall    Orthopedic Precautions:N/A  Braces: N/A  Respiratory Status: Room air     Occupational Performance:     Bed Mobility:    Patient completed Supine to Sit with minimum assistance  Patient completed Sit to Supine " with moderate assistance     Functional Mobility/Transfers:  Patient completed Sit <> Stand Transfer with minimum assistance  with  hand-held assist   Functional Mobility: deferred due to blood pressure, though pt stood ~1 min with min A, mild retropulsion noted.     Activities of Daily Living:  Grooming: stand by assistance to wash face with cloth at EOB   Lower Body Dressing: minimum assistance to don b/l socks while seated EOB       AMPAC 6 Click ADL: 19    Treatment & Education:  VSS mostly stable though orthostatics measured per RN request with following results: supine 141/67, sitting /53, standing at EOB 97/53, 3 min after returning to sitting /65, supine in bed 131/65. As pt with significant drop in BP in standing, further OOB activities deferred for safety. Upon return to bed, LUE PIV noted to have become dislodged. No active bleeding noted and dressing remained in place. RN aware. Pt does endorse mild symptoms (dizziness). Pt educated on: OT POC, OT role, safety with bed mobility, and safety with ADLs. Pt verbalized understanding all education though retention likely limited.     Patient left with bed in chair position with all lines intact, call button in reach, and RN notified    GOALS:   Multidisciplinary Problems       Occupational Therapy Goals          Problem: Occupational Therapy    Goal Priority Disciplines Outcome Interventions   Occupational Therapy Goal     OT, PT/OT Progressing    Description: Goals to be met by: 3/26/25     Patient will increase functional independence with ADLs by performing:    UE Dressing with [place].  LE Dressing with Modified [place].  Grooming while standing at sink with Modified [place].  Toileting from toilet with Modified [place] for hygiene and clothing management.   Toilet transfer to toilet with Modified [place].                   Multidisciplinary Problems (Resolved)          Problem: Occupational Therapy    Goal Priority  Disciplines Outcome Interventions   Occupational Therapy Goal   (Resolved)     OT, PT/OT Met                          Time Tracking:     OT Date of Treatment: 02/26/25  OT Start Time: 1100  OT Stop Time: 1124  OT Total Time (min): 24 min    Billable Minutes:Self Care/Home Management 10  Therapeutic Activity 14    OT/SIL: OT          2/26/2025

## 2025-02-26 NOTE — ASSESSMENT & PLAN NOTE
2/26 orthostatic hypotension today. amlodipine discontinued. Gentle LR hydration. hydralazine 10mg q8h PRN for SBP > 160mm HG. pending SNF

## 2025-02-26 NOTE — ASSESSMENT & PLAN NOTE
- likely due to dehydration and electrolyte abnormalities, UTI likely contributing   - will check CTH given patient is a poor historian  - CXR, UA pending  - s/p 500cc IVFs  - echo ordered  - replace lytes  - monitor tele  - neuro checks q4hr  2/23 Syncope - likely due to dehydration and electrolyte abnormalities. CTHead -  No acute intracranial abnormality detected.  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 CXR WNL , UA+, UC pending. continue IV ceftriaxone.  s/p 500cc IVFs.  echo orderedneuro checks q4hr. calcium improved to 8.8  after given 1g IV Ca gluconate in the ED. continue telemetry  2/24   DVT sonogram - No evidence of deep venous thrombosis in either lower extremity and D dimer 0.73.   Echo -  Left Ventricle: The left ventricle is normal in size. Ventricular mass is normal. Normal wall thickness. Regional wall motion abnormalities present. See diagram for wall motion findings. Septal motion is abnormal. There is normal systolic function with a visually estimated ejection fraction of 60 - 65%. Ejection fraction is approximately 63%. Quantitated ejection fraction is 64%. There is indeterminate diastolic function.    Right Ventricle: Normal right ventricular cavity size. Wall thickness is normal. Systolic function is normal.    Left Atrium: Normal left atrial size.    Right Atrium: Normal right atrial size.    Aortic Valve: There is mild aortic valve sclerosis. Aortic valve peak velocity is 1.5 m/s. Mean gradient is 4 mmHg.    Mitral Valve: There is bileaflet sclerosis. There is normal leaflet mobility.    Tricuspid Valve: The tricuspid valve is structurally normal. There is mild regurgitation.    Pulmonic Valve: The pulmonic valve is structurally normal. There is mild regurgitation.    Pulmonary Artery: There is mild to moderate pulmonary hypertension. The estimated pulmonary artery systolic pressure is 49 mmHg.    IVC/SVC: Normal venous pressure at 3 mmHg.     Pericardium: There is no pericardial effusion.

## 2025-02-26 NOTE — PLAN OF CARE
Pt bp remained stable this shift. Rested quietly through the night  will cont with care       Problem: Infection  Goal: Absence of Infection Signs and Symptoms  Outcome: Progressing     Problem: Adult Inpatient Plan of Care  Goal: Plan of Care Review  Outcome: Progressing  Goal: Patient-Specific Goal (Individualized)  Outcome: Progressing  Goal: Absence of Hospital-Acquired Illness or Injury  Outcome: Progressing  Goal: Optimal Comfort and Wellbeing  Outcome: Progressing  Goal: Readiness for Transition of Care  Outcome: Progressing     Problem: Fall Injury Risk  Goal: Absence of Fall and Fall-Related Injury  Outcome: Progressing     Problem: Skin Injury Risk Increased  Goal: Skin Health and Integrity  Outcome: Progressing     Problem: Acute Kidney Injury/Impairment  Goal: Fluid and Electrolyte Balance  Outcome: Progressing  Goal: Improved Oral Intake  Outcome: Progressing  Goal: Effective Renal Function  Outcome: Progressing

## 2025-02-26 NOTE — PROGRESS NOTES
Alex Reich - Acute [facility] Medicine  Progress Note    Patient Name: Gloria Garcia  MRN: [id number]  Patient Class: IP- Inpatient   Admission Date: 2/22/2025  Length of Stay: 4 days  Attending Physician: Richar Truong MD  Primary Care Provider: Hailey, Primary Doctor        Subjective     Principal Problem:Syncope        HPI:  Gloria Garcia is a 89 y.o. female with a PMHx of HTN, dementia, COPD, constipation and asthma who presents to [facility] for evaluation of syncope. Patient is a poor historian. She reports suddenly passing out while sitting down watching TV earlier today. She's unsure how long she was out for. Denies any fall or head trauma, though unreliable historian. She complained of dizziness and weakness to ED provider, however she denies these symptoms at the time of my exam. Denies any preceding lightheadedness, chest pain, or SOB earlier today. She's unsure if she took her home meds this AM. She is otherwise feeling well without any complaints of chest pain, LE swelling, headache, weakness, or SOB.    ED: hypertensive to /95, vitals otherwise stable. No leukocytosis. K 3.0, Mag 1.3, Ca 6.0 (corrected 7.3). UA infectious. Given 500cc IVFs, IV Ca gluconate, IV mag, and PO klyte    Overview/Hospital Course:  2/23 AAOX2. denies symptoms. Syncope - likely due to dehydration and electrolyte abnormalities. CTHead -  No acute intracranial abnormality detected.  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 CXR WNL , UA+, UC pending. continue IV ceftriaxone.  s/p 500cc IVFs.  echo ordered. neuro checks q4hr. calcium improved to 8.8  after given 1g IV Ca gluconate in the ED. SBP 200s improved to 140s Cr 0.7--> 1. monitor. CPK levels WNL . continue telemetry .   2/24 Lisinopril discontinued. renal sonogram - Bilateral medical renal disease.  No hydronephrosis. Simple left renal cyst. started on hydralazine 25mg q 12h  for elevated BP. UC - Multiple  organisms isolated. None in predominance.  Ceftriaxone discontinued. DVT sonogram - No evidence of deep venous thrombosis in either lower extremity and D dimer 0.73. Echo -  Left Ventricle: The left ventricle is normal in size. Ventricular mass is normal. Normal wall thickness. Regional wall motion abnormalities present. See diagram for wall motion findings. Septal motion is abnormal. There is normal systolic function with a visually estimated ejection fraction of 60 - 65%. Ejection fraction is approximately 63%. Quantitated ejection fraction is 64%. There is indeterminate diastolic function.    Right Ventricle: Normal right ventricular cavity size. Wall thickness is normal. Systolic function is normal.    Left Atrium: Normal left atrial size.    Right Atrium: Normal right atrial size.    Aortic Valve: There is mild aortic valve sclerosis. Aortic valve peak velocity is 1.5 m/s. Mean gradient is 4 mmHg.    Mitral Valve: There is bileaflet sclerosis. There is normal leaflet mobility.    Tricuspid Valve: The tricuspid valve is structurally normal. There is mild regurgitation.    Pulmonic Valve: The pulmonic valve is structurally normal. There is mild regurgitation.    Pulmonary Artery: There is mild to moderate pulmonary hypertension. The estimated pulmonary artery systolic pressure is 49 mmHg.    IVC/SVC: Normal venous pressure at 3 mmHg.    Pericardium: There is no pericardial effusion. orthostatics negative.   PT/OT consulted - recs SNF.   2/25 hydralazine decreased to 10mg QID. amlodipine 10mg switched to HS. EKG with sinus arrhythmia. Cr improved to 1.2. discussed with MITZI Miss Bailey ([phone]) needs SNF referrals   2/26 orthostatic hypotension today. amlodipine discontinued. Gentle LR hydration. hydralazine 10mg q8h PRN for SBP > 160mm HG. pending SNF        Review of Systems:   Pain scale:    Constitutional:  fever,  chills, headache, vision loss, hearing loss, weight loss, Generalized weakness, falls, loss of  smell, loss of taste, poor appetite,  sore throat  Respiratory: cough, shortness of breath.   Cardiovascular: chest pain, dizziness, palpitations, orthopnea, swelling of feet, syncope  Gastrointestinal: nausea, vomiting, abdominal pain, diarrhea, black stool,  blood in stool, change in bowel habits, constipation  Genitourinary: hematuria, dysuria, urgency, frequency  Integument/Breast: rash,  pruritis  Hematologic/Lymphatic: easy bruising, lymphadenopathy  Musculoskeletal: arthralgias , myalgias, back pain, neck pain, knee pain  Neurological: confusion, seizures, tremors, slurred speech  Behavioral/Psych:  depression, anxiety, auditory or visual hallucinations     OBJECTIVE:     Physical Exam:  Body mass index is 17.41 kg/m².    Constitutional: Appears thin built and frail  Head: Normocephalic and atraumatic.   Neck: Normal range of motion. Neck supple.   Cardiovascular: Normal heart rate.  Regular heart rhythm.  Pulmonary/Chest: Effort normal.   Abdominal: No distension.  No tenderness  Musculoskeletal: Normal range of motion. No edema.   Neurological: Alert and oriented to person, place. able to move bilateral upper and lower extremities without limitation . poor historian  Skin: Skin is warm and dry.   Psychiatric: Normal mood and affect. Behavior is normal.                  Vital Signs  Temp: 98.7 °F (37.1 °C) (02/26/25 0715)  Pulse: 87 (02/26/25 1112)  Resp: 20 (02/26/25 0715)  BP: 131/70 (02/26/25 1122)  SpO2: (!) 93 % (02/26/25 0900)     24 Hour VS Range    Temp:  [97.4 °F (36.3 °C)-99 °F (37.2 °C)]   Pulse:  [60-88]   Resp:  [19-32]   BP: ()/(53-82)   SpO2:  [93 %-96 %]     Intake/Output Summary (Last 24 hours) at 2/26/2025 1155  Last data filed at 2/25/2025 1816  Gross per 24 hour   Intake 322 ml   Output --   Net 322 ml           I/O This Shift:  No intake/output data recorded.    Wt Readings from Last 3 Encounters:   02/24/25 46 kg (101 lb 6.6 oz)   02/18/25 39.3 kg (86 lb 10.3 oz)   11/20/24 43.2  "kg (95 lb 3.8 oz)       I have personally reviewed the vitals and recorded Intake/Output     Laboratory/Diagnostic Data:    CBC/Anemia Labs: Coags:    Recent Labs   Lab 02/24/25  0243 02/25/25  0642 02/26/25  0811   WBC 4.67 3.87* 4.41   HGB 10.6* 10.8* 10.9*   HCT 34.1* 34.0* 35.9*    277 278   MCV 88 86 89   RDW 13.4 13.4 13.6    No results for input(s): "PT", "INR", "APTT" in the last 168 hours.     Chemistries: ABG:   Recent Labs   Lab 02/22/25  1720 02/23/25  0419 02/24/25  0243 02/25/25  0642 02/26/25  0811    138 138 137 136   K 3.0* 4.7 4.8 4.8 4.5   * 106 105 104 105   CO2 19* 25 23 25 22*   BUN 17 16 17 15 15   CREATININE 0.7 1.0 1.0 1.2 1.1   CALCIUM 6.0* 8.8 8.8 9.0 9.1   PROT 4.6*  --   --   --  6.5   BILITOT 0.3  --   --   --  0.5   ALKPHOS 43  --   --   --  57   ALT <5*  --   --   --  6*   AST 23  --   --   --  24   MG 1.3* 2.6 2.2 2.2 2.1   PHOS  --  2.5* 3.5 4.2  --     No results for input(s): "PH", "PCO2", "PO2", "HCO3", "POCSATURATED", "BE" in the last 168 hours.     POCT Glucose: HbA1c:    Recent Labs   Lab 02/24/25 2051 02/25/25  0744 02/25/25  1203 02/25/25  1609 02/25/25  2339 02/26/25  0756   POCTGLUCOSE 111* 85 113* 88 85 82    Hemoglobin A1C   Date Value Ref Range Status   02/23/2025 5.2 4.0 - 5.6 % Final     Comment:     ADA Screening Guidelines:  5.7-6.4%  Consistent with prediabetes  >or=6.5%  Consistent with diabetes    High levels of fetal hemoglobin interfere with the HbA1C  assay. Heterozygous hemoglobin variants (HbS, HgC, etc)do  not significantly interfere with this assay.   However, presence of multiple variants may affect accuracy.     03/21/2024 5.4 4.7 - 5.6 % Final        Cardiac Enzymes: Ejection Fractions:    No results for input(s): "CPK", "CPKMB", "MB", "TROPONINI" in the last 72 hours.   EF   Date Value Ref Range Status   02/24/2025 63 % Final          No results for input(s): "COLORU", "APPEARANCEUA", "PHUR", "SPECGRAV", "PROTEINUA", "GLUCUA", " ""KETONESU", "BILIRUBINUA", "OCCULTUA", "NITRITE", "UROBILINOGEN", "LEUKOCYTESUR", "RBCUA", "WBCUA", "BACTERIA", "SQUAMEPITHEL", "HYALINECASTS" in the last 48 hours.    Invalid input(s): "WRIGHTSUR"      Procalcitonin (ng/mL)   Date Value   03/28/2020 0.05     Lactate (Lactic Acid) (mmol/L)   Date Value   03/28/2020 0.7     BNP (pg/mL)   Date Value   02/22/2025 83   11/20/2024 107 (H)   10/06/2024 80   07/09/2024 49   04/20/2024 31     CRP (mg/L)   Date Value   03/28/2020 40.3 (H)     Sed Rate (mm/Hr)   Date Value   03/28/2020 36 (H)     D-Dimer (mg/L FEU)   Date Value   02/24/2025 0.73 (H)   10/06/2024 0.81 (H)     Ferritin (ng/mL)   Date Value   03/28/2020 22     LD (U/L)   Date Value   03/28/2020 244     Troponin I (ng/mL)   Date Value   11/20/2024 0.019   10/06/2024 0.008   07/09/2024 0.006   04/20/2024 0.011   08/02/2022 0.007   03/30/2020 0.020   03/29/2020 0.019   03/28/2020 0.058 (H)     CPK (U/L)   Date Value   02/23/2025 71   03/28/2020 87     No results found for this or any previous visit.  SARS-CoV2 (COVID-19) Qualitative PCR (no units)   Date Value   03/29/2020 Undetected     SARS-CoV-2 RNA, Amplification, Qual (no units)   Date Value   11/21/2024 Negative   07/09/2024 Negative     POC Rapid COVID (no units)   Date Value   11/10/2024 Negative   10/06/2024 Negative   08/02/2022 Negative       Microbiology labs for the last week  Microbiology Results (last 7 days)       Procedure Component Value Units Date/Time    Urine culture [[id number]] Collected: 02/22/25 2008    Order Status: Completed Specimen: Urine Updated: 02/24/25 0616     Urine Culture, Routine Multiple organisms isolated. None in predominance.  Repeat if      clinically necessary.    Narrative:      Specimen Source->Urine            Reviewed and noted in plan where applicable- Please see chart for full lab data.    Lines/Drains:       Peripheral IV - Single Lumen 02/22/25 18 G Left Antecubital (Active)   Number of days: 1       Imaging  ECG " Results              EKG 12-lead (Final result)        Collection Time Result Time QRS Duration OHS QTC Calculation    02/22/25 16:01:57 02/23/25 09:32:54 68 466                     Final result by Interface, Lab In [facility] (02/23/25 09:33:01)                   Narrative:    Test Reason : R55,    Vent. Rate :  60 BPM     Atrial Rate :  60 BPM     P-R Int : 170 ms          QRS Dur :  68 ms      QT Int : 466 ms       P-R-T Axes : -28   1  33 degrees    QTcB Int : 466 ms    Sinus rhythm with Premature supraventricular complexes  Minimal voltage criteria for LVH, may be normal variant ( Sokolow-Piper )  Low septal forces ; Abnormal R wave progression in the precordial leads  Nonspecific T wave abnormality  Prolonged QT  Abnormal ECG  When compared with ECG of 20-Nov-2024 15:36,  Vent. rate has decreased by  36 bpm  ST elevation now present in Anterior leads  Confirmed by Steve Granados ([id number]) on 2/23/2025 9:32:52 AM    Referred By: AAAREFERRAL SELF           Confirmed By: Steve Granados                                    No results found for this or any previous visit.      US Lower Extremity Veins Bilateral  Narrative: EXAMINATION:  US LOWER EXTREMITY VEINS BILATERAL    CLINICAL HISTORY:  r/o DVT;    TECHNIQUE:  Duplex and color flow Doppler and dynamic compression was performed of the bilateral lower extremity veins was performed.    COMPARISON:  None    FINDINGS:  Right thigh veins: The common femoral, femoral, popliteal, upper greater saphenous, and deep femoral veins are patent and free of thrombus. The veins are normally compressible and have normal phasic flow and augmentation response.    Right calf veins: The visualized calf veins are patent.    Left thigh veins: The common femoral, femoral, popliteal, upper greater saphenous, and deep femoral veins are patent and free of thrombus. The veins are normally compressible and have normal phasic flow and augmentation response.    Left calf veins: The visualized calf veins are  patent.    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  Impression: No evidence of deep venous thrombosis in either lower extremity.    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    Electronically signed by: Charles Oleary MD  Date:    02/24/2025  Time:    11:16  Echo    Left Ventricle: The left ventricle is normal in size. Ventricular mass   is normal. Normal wall thickness. Regional wall motion abnormalities   present. See diagram for wall motion findings. Septal motion is abnormal.   There is normal systolic function with a visually estimated ejection   fraction of 60 - 65%. Ejection fraction is approximately 63%. Quantitated   ejection fraction is 64%. There is indeterminate diastolic function.    Right Ventricle: Normal right ventricular cavity size. Wall thickness   is normal. Systolic function is normal.    Left Atrium: Normal left atrial size.    Right Atrium: Normal right atrial size.    Aortic Valve: There is mild aortic valve sclerosis. Aortic valve peak   velocity is 1.5 m/s. Mean gradient is 4 mmHg.    Mitral Valve: There is bileaflet sclerosis. There is normal leaflet   mobility.    Tricuspid Valve: The tricuspid valve is structurally normal. There is   mild regurgitation.    Pulmonic Valve: The pulmonic valve is structurally normal. There is   mild regurgitation.    Pulmonary Artery: There is mild to moderate pulmonary hypertension. The   estimated pulmonary artery systolic pressure is 49 mmHg.    IVC/SVC: Normal venous pressure at 3 mmHg.    Pericardium: There is no pericardial effusion.  US Retroperitoneal Complete  Narrative: EXAMINATION:  US RETROPERITONEAL COMPLETE    CLINICAL HISTORY:  ROD;    TECHNIQUE:  Ultrasound of the kidneys and urinary bladder was performed including color flow and Doppler  evaluation of the kidneys.    COMPARISON:  CT abdomen pelvis 11/20/2024.    FINDINGS:  Evaluation limited due to patient cooperation.    Right kidney: The right kidney measures 8.8 cm. Cortical scarring versus lobular contour without overall cortical thinning.  Mildly diminished perfusion.  Resistive index measures 0.80.  No mass. No renal stone. No hydronephrosis.    Left kidney: The left kidney measures 7.1 cm. Cortical scarring versus lobular contour without overall cortical thinning.  Mildly diminished perfusion.  Resistive index measures 0.85.  1.3 cm simple cyst at the upper pole.  No solid mass.  No renal stone. No hydronephrosis.    The bladder is under distended, limiting evaluation.    Splenic resistive index measures 0.69.  Impression: Bilateral medical renal disease.  No hydronephrosis.    Simple left renal cyst.    Electronically signed by resident: Chapito Salgado  Date:    02/24/2025  Time:    05:09    Electronically signed by: Charles Oleary MD  Date:    02/24/2025  Time:    07:29  X-Ray Chest AP Portable  Narrative: EXAMINATION:  XR CHEST AP PORTABLE    CLINICAL HISTORY:  increased work of breathing;    TECHNIQUE:  Single frontal view of the chest was performed.    COMPARISON:  Chest radiograph February 22, 2025    FINDINGS:  Single portable chest radiograph is submitted.  When accounting for position and technique and depth of inspiration the appearance of the cardiomediastinal silhouette is stable.  Aortic atherosclerotic change and tortuosity appears stable.    Mild chronic and atelectatic appearing changes are noted, there is no evidence for superimposed confluent infiltrate or consolidation, significant pleural effusion or pneumothorax.    The visualized osseous structures appear intact, chronic change noted.  Impression: Chronic and atelectatic appearing change, there is no radiographic evidence for superimposed acute intrathoracic process.    Electronically signed by: Miles  Chandan  Date:    02/24/2025  Time:    05:54      Labs, Imaging, EKG and Diagnostic results from 2/26/2025 were reviewed.    Medications:  Medication list was reviewed and changes noted under Assessment/Plan.  Medications Ordered Prior to Encounter[1]  Scheduled Medications:  Current Facility-Administered Medications   Medication Dose Route Frequency    atorvastatin  10 mg Oral QHS    heparin (porcine)  5,000 Units Subcutaneous Q12H     PRN:   Current Facility-Administered Medications:     acetaminophen, 650 mg, Oral, Q4H PRN    albuterol-ipratropium, 3 mL, Nebulization, Q4H PRN    dextrose 50%, 12.5 g, Intravenous, PRN    dextrose 50%, 25 g, Intravenous, PRN    glucagon (human recombinant), 1 mg, Intramuscular, PRN    glucose, 16 g, Oral, PRN    glucose, 24 g, Oral, PRN    hydrALAZINE, 10 mg, Oral, Q8H PRN    melatonin, 6 mg, Oral, Nightly PRN    polyethylene glycol, 17 g, Oral, Daily PRN    sodium chloride 0.9%, 10 mL, Intravenous, PRN  Infusions:    lactated ringers   Intravenous Continuous         Estimated Creatinine Clearance: 26.2 mL/min (based on SCr of 1.1 mg/dL).             Assessment and Plan     * Syncope  - likely due to dehydration and electrolyte abnormalities, UTI likely contributing   - will check CTH given patient is a poor historian  - CXR, UA pending  - s/p 500cc IVFs  - echo ordered  - replace lytes  - monitor tele  - neuro checks q4hr  2/23 Syncope - likely due to dehydration and electrolyte abnormalities. CTHead -  No acute intracranial abnormality detected.  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 CXR WNL , UA+, UC pending. continue IV ceftriaxone.  s/p 500cc IVFs.  echo orderedneuro checks q4hr. calcium improved to 8.8  after given 1g IV Ca gluconate in the ED. continue telemetry  2/24   DVT sonogram - No evidence of deep venous thrombosis in either lower extremity and D dimer 0.73.   Echo -  Left Ventricle: The left ventricle is normal in size.  Ventricular mass is normal. Normal wall thickness. Regional wall motion abnormalities present. See diagram for wall motion findings. Septal motion is abnormal. There is normal systolic function with a visually estimated ejection fraction of 60 - 65%. Ejection fraction is approximately 63%. Quantitated ejection fraction is 64%. There is indeterminate diastolic function.    Right Ventricle: Normal right ventricular cavity size. Wall thickness is normal. Systolic function is normal.    Left Atrium: Normal left atrial size.    Right Atrium: Normal right atrial size.    Aortic Valve: There is mild aortic valve sclerosis. Aortic valve peak velocity is 1.5 m/s. Mean gradient is 4 mmHg.    Mitral Valve: There is bileaflet sclerosis. There is normal leaflet mobility.    Tricuspid Valve: The tricuspid valve is structurally normal. There is mild regurgitation.    Pulmonic Valve: The pulmonic valve is structurally normal. There is mild regurgitation.    Pulmonary Artery: There is mild to moderate pulmonary hypertension. The estimated pulmonary artery systolic pressure is 49 mmHg.    IVC/SVC: Normal venous pressure at 3 mmHg.    Pericardium: There is no pericardial effusion.     Orthostatic hypotension  2/26 orthostatic hypotension today. amlodipine discontinued. Gentle LR hydration. hydralazine 10mg q8h PRN for SBP > 160mm HG. pending SNF      Physical deconditioning  2/25 PT recs SNF. Family (Niece and POA) agreeable to SNF. needs SNF referrals       Anemia    Patient's with Normocytic anemia.. Hemoglobin stable. Etiology likely due to chronic disease .  Current CBC reviewed-    Recent Labs   Lab 02/23/25  0422 02/24/25  0243 02/25/25  0642   HGB 10.0* 10.6* 10.8*         Component Value Date/Time    MCV 86 02/25/2025 0642    RDW 13.4 02/25/2025 0642    FERRITIN 22 03/28/2020 2325     Monitor CBC and transfuse if H/H drops below 7/21.      Hypocalcemia  - Ca 6.0, corrected for albumin is 7.3  - given 1g IV Ca gluconate in the  ED  - further replacement as indicated  - trend daily labs  2/23  calcium improved to 8.8  after given 1g IV Ca gluconate in the ED.    Acute cystitis without hematuria  - afebrile without leukocytosis  - continue IV rocephin 1g q24hrs  - follow urine cx  2/24  UC - Multiple organisms isolated. None in predominance.  Ceftriaxone discontinued. Echo pending.  PT/OT consulted    Hypomagnesemia  Patient has Abnormal Magnesium: hypomagnesemia. Will continue to monitor electrolytes closely. Will replace the affected electrolytes and repeat labs to be done after interventions completed. The patient's magnesium results have been reviewed and are listed below.  Recent Labs   Lab 02/24/25  0243   MG 2.2          Hypokalemia  - K 3.0  - replace PO  - monitor daily labs  - tele   2/23 resolved     Asthma-COPD overlap syndrome  - no acute issues  - satting 97% on RA  - duonebs PRN    Essential hypertension  - uncontrolled BP in the setting of questionable med compliance  - home benazepril not on formulary-- will sub w/ lisinopril 20mg daily with first dose tonight given unsure if pt took home meds today  - continue home amlodipine 10mg daily  2/23  SBP 200s.   Chronic, controlled.  Latest blood pressure and vitals reviewed-   Temp:  [97.2 °F (36.2 °C)-99 °F (37.2 °C)]   Pulse:  [64-93]   Resp:  [19-32]   BP: (103-174)/(56-85)   SpO2:  [92 %-97 %] .   Home meds for hypertension were reviewed and noted below. Hospital anti-hypertensive changes were made as shown below.  Hypertension Medications              [id number]   Take 1 capsule by mouth every morning.            Hydralazine - PRN meds if BP> 170/110 mm HG     2/25 hydralazine decreased to 10mg QID. amlodipine 10mg switched to HSdiscussed with POA Miss Bailey ([phone]) needs SNF referrals   2/26 orthostatic hypotension today. amlodipine discontinued. Gentle LR hydration. hydralazine 10mg q8h PRN for SBP > 160mm HG. pending SNF    ROD (acute kidney injury)  ROD is  likely due to pre-renal azotemia due to intravascular volume depletion. Baseline creatinine is  0.7 . Most recent creatinine and eGFR are listed below.  Recent Labs     02/23/25  0419 02/24/25  0243 02/25/25  0642   CREATININE 1.0 1.0 1.2   EGFRNORACEVR 54.5* 54.5* 43.8*      Plan    - Avoid nephrotoxins and renally dose meds for GFR listed above  - Monitor urine output, serial BMP, and adjust therapy as needed  - renal sonogram and CPK levels   monitor on lisinopril   2/24 Lisinopril discontinued. renal sonogram - Bilateral medical renal disease.  No hydronephrosis. Simple left renal cyst. started on hydralazine 25mg q 12h  for elevated BP.       VTE Risk Mitigation (From admission, onward)           Ordered     heparin (porcine) injection 5,000 Units  Every 12 hours         02/22/25 1913     IP VTE HIGH RISK PATIENT  Once         02/22/25 1902     IP VTE HIGH RISK PATIENT  Once         02/22/25 1902                    Discharge Planning   DOTTIE: 2/27/2025     Code Status: Full Code   Medical Readiness for Discharge Date: 2/26/2025  Discharge Plan A: Skilled Nursing Facility   Discharge Delays: None known at this time                    Richar Truong MD  Department of Hospital Medicine   [facility] - Acute Medical Stepdown         [1]   No current facility-administered medications on file prior to encounter.     Current Outpatient Medications on File Prior to Encounter   Medication Sig Dispense Refill    albuterol (PROVENTIL/VENTOLIN HFA) 90 mcg/actuation inhaler Inhale 2 puffs into the lungs every 6 (six) hours as needed. Rescue 18 g 5    albuterol-ipratropium (DUO-NEB) 2.5 mg-0.5 mg/3 mL nebulizer solution use 1 vial by nebulization every 4 (four) hours as needed for Wheezing or Shortness of Breath. Rescue 90 mL 2    ARICEPT 5 mg tablet Take 5 mg by mouth every evening.      atorvastatin (LIPITOR) 10 MG tablet Take 10 mg by mouth once daily.      budesonide-glycopyr-formoterol (BREZTRI AEROSPHERE) 160-9-4.8  mcg/actuation HFAA Inhale 2 puffs into the lungs 2 (two) times a day. 10.7 g 5

## 2025-02-27 VITALS
OXYGEN SATURATION: 97 % | SYSTOLIC BLOOD PRESSURE: 144 MMHG | HEIGHT: 64 IN | RESPIRATION RATE: 18 BRPM | DIASTOLIC BLOOD PRESSURE: 68 MMHG | BODY MASS INDEX: 17.32 KG/M2 | WEIGHT: 101.44 LBS | TEMPERATURE: 99 F | HEART RATE: 73 BPM

## 2025-02-27 PROBLEM — N17.9 AKI (ACUTE KIDNEY INJURY): Status: RESOLVED | Noted: 2020-03-28 | Resolved: 2025-02-27

## 2025-02-27 PROBLEM — E83.42 HYPOMAGNESEMIA: Status: RESOLVED | Noted: 2025-02-22 | Resolved: 2025-02-27

## 2025-02-27 PROBLEM — E83.51 HYPOCALCEMIA: Status: RESOLVED | Noted: 2025-02-22 | Resolved: 2025-02-27

## 2025-02-27 PROBLEM — N30.00 ACUTE CYSTITIS WITHOUT HEMATURIA: Status: RESOLVED | Noted: 2025-02-22 | Resolved: 2025-02-27

## 2025-02-27 PROBLEM — I95.1 ORTHOSTATIC HYPOTENSION: Status: RESOLVED | Noted: 2025-02-26 | Resolved: 2025-02-27

## 2025-02-27 PROBLEM — R55 SYNCOPE: Status: RESOLVED | Noted: 2025-02-22 | Resolved: 2025-02-27

## 2025-02-27 PROBLEM — D64.9 ANEMIA: Status: RESOLVED | Noted: 2025-02-23 | Resolved: 2025-02-27

## 2025-02-27 PROBLEM — E87.6 HYPOKALEMIA: Status: RESOLVED | Noted: 2025-02-22 | Resolved: 2025-02-27

## 2025-02-27 LAB
POCT GLUCOSE: 101 MG/DL (ref 70–110)
POCT GLUCOSE: 105 MG/DL (ref 70–110)
POCT GLUCOSE: 82 MG/DL (ref 70–110)

## 2025-02-27 PROCEDURE — 63600175 PHARM REV CODE 636 W HCPCS: Performed by: PHYSICIAN ASSISTANT

## 2025-02-27 PROCEDURE — 97116 GAIT TRAINING THERAPY: CPT | Mod: CQ

## 2025-02-27 PROCEDURE — 86580 TB INTRADERMAL TEST: CPT

## 2025-02-27 PROCEDURE — 25000003 PHARM REV CODE 250: Performed by: HOSPITALIST

## 2025-02-27 PROCEDURE — 30200315 PPD INTRADERMAL TEST REV CODE 302

## 2025-02-27 PROCEDURE — 97530 THERAPEUTIC ACTIVITIES: CPT

## 2025-02-27 PROCEDURE — 97535 SELF CARE MNGMENT TRAINING: CPT

## 2025-02-27 RX ORDER — HYDRALAZINE HYDROCHLORIDE 10 MG/1
10 TABLET, FILM COATED ORAL EVERY 8 HOURS PRN
Qty: 90 TABLET | Refills: 1 | Status: SHIPPED | OUTPATIENT
Start: 2025-02-27 | End: 2026-02-27

## 2025-02-27 RX ADMIN — TUBERCULIN PURIFIED PROTEIN DERIVATIVE 5 UNITS: 5 INJECTION, SOLUTION INTRADERMAL at 04:02

## 2025-02-27 RX ADMIN — HEPARIN SODIUM 5000 UNITS: 5000 INJECTION INTRAVENOUS; SUBCUTANEOUS at 08:02

## 2025-02-27 RX ADMIN — HYDRALAZINE HYDROCHLORIDE 10 MG: 10 TABLET, FILM COATED ORAL at 01:02

## 2025-02-27 NOTE — PROGRESS NOTES
[facility] Medicine  Progress Note    Patient Name: Gloria Garcia  MRN: [id number]  Patient Class: IP- Inpatient   Admission Date: 2/22/2025  Length of Stay: 5 days  Attending Physician: Yaya Cast DO  Primary Care Provider: Hailey, Primary Doctor        Subjective     Principal Problem:Syncope        HPI:  Gloria Garcia is a 89 y.o. female with a PMHx of HTN, dementia, COPD, constipation and asthma who presents to [facility] for evaluation of syncope. Patient is a poor historian. She reports suddenly passing out while sitting down watching TV earlier today. She's unsure how long she was out for. Denies any fall or head trauma, though unreliable historian. She complained of dizziness and weakness to ED provider, however she denies these symptoms at the time of my exam. Denies any preceding lightheadedness, chest pain, or SOB earlier today. She's unsure if she took her home meds this AM. She is otherwise feeling well without any complaints of chest pain, LE swelling, headache, weakness, or SOB.    ED: hypertensive to /95, vitals otherwise stable. No leukocytosis. K 3.0, Mag 1.3, Ca 6.0 (corrected 7.3). UA infectious. Given 500cc IVFs, IV Ca gluconate, IV mag, and PO klyte    Overview/Hospital Course:  2/23 AAOX2. denies symptoms. Syncope - likely due to dehydration and electrolyte abnormalities. CTHead -  No acute intracranial abnormality detected.  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 CXR WNL , UA+, UC pending. continue IV ceftriaxone.  s/p 500cc IVFs.  echo ordered. neuro checks q4hr. calcium improved to 8.8  after given 1g IV Ca gluconate in the ED. SBP 200s improved to 140s Cr 0.7--> 1. monitor. CPK levels WNL . continue telemetry .   2/24 Lisinopril discontinued. renal sonogram - Bilateral medical renal disease.  No hydronephrosis. Simple left renal cyst. started on hydralazine 25mg q 12h  for elevated BP. UC - Multiple  organisms isolated. None in predominance.  Ceftriaxone discontinued. DVT sonogram - No evidence of deep venous thrombosis in either lower extremity and D dimer 0.73. Echo -  Left Ventricle: The left ventricle is normal in size. Ventricular mass is normal. Normal wall thickness. Regional wall motion abnormalities present. See diagram for wall motion findings. Septal motion is abnormal. There is normal systolic function with a visually estimated ejection fraction of 60 - 65%. Ejection fraction is approximately 63%. Quantitated ejection fraction is 64%. There is indeterminate diastolic function.    Right Ventricle: Normal right ventricular cavity size. Wall thickness is normal. Systolic function is normal.    Left Atrium: Normal left atrial size.    Right Atrium: Normal right atrial size.    Aortic Valve: There is mild aortic valve sclerosis. Aortic valve peak velocity is 1.5 m/s. Mean gradient is 4 mmHg.    Mitral Valve: There is bileaflet sclerosis. There is normal leaflet mobility.    Tricuspid Valve: The tricuspid valve is structurally normal. There is mild regurgitation.    Pulmonic Valve: The pulmonic valve is structurally normal. There is mild regurgitation.    Pulmonary Artery: There is mild to moderate pulmonary hypertension. The estimated pulmonary artery systolic pressure is 49 mmHg.    IVC/SVC: Normal venous pressure at 3 mmHg.    Pericardium: There is no pericardial effusion. orthostatics negative.   PT/OT consulted - recs SNF.   2/25 hydralazine decreased to 10mg QID. amlodipine 10mg switched to HS. EKG with sinus arrhythmia. Cr improved to 1.2. discussed with MITZI Miss Bailey ([phone]) needs SNF referrals   2/26 orthostatic hypotension today. amlodipine discontinued. Gentle LR hydration. hydralazine 10mg q8h PRN for SBP > 160mm HG. pending SNF    Continue to hold amlodipine, hydralazine prn ordered for elevated BP. Would be okay with higher BP while supine to avoid orthostatics    Interval History:  NIYA, was able to get out of bed without dizziness. Feeling better on exam today. Stable for discharge at this time.     Review of Systems   Constitutional:  Negative for fatigue and fever.   Respiratory:  Negative for cough and shortness of breath.    Cardiovascular:  Negative for chest pain.   Gastrointestinal:  Negative for abdominal pain, diarrhea, nausea and vomiting.     Objective:     Vital Signs (Most Recent):  Temp: 99.1 °F (37.3 °C) (02/27/25 1214)  Pulse: 66 (02/27/25 1214)  Resp: 20 (02/27/25 1214)  BP: (!) 169/82 (02/27/25 1214)  SpO2: 100 % (02/27/25 1214) Vital Signs (24h Range):  Temp:  [98.1 °F (36.7 °C)-100 °F (37.8 °C)] 99.1 °F (37.3 °C)  Pulse:  [58-80] 66  Resp:  [20-27] 20  SpO2:  [95 %-100 %] 100 %  BP: (117-169)/(69-83) 169/82     Weight: 46 kg (101 lb 6.6 oz)  Body mass index is 17.41 kg/m².    Intake/Output Summary (Last 24 hours) at 2/27/2025 1456  Last data filed at 2/27/2025 0416  Gross per 24 hour   Intake 420 ml   Output 450 ml   Net -30 ml      Physical Exam  Constitutional:       Appearance: Normal appearance.   HENT:      Head: Normocephalic and atraumatic.   Cardiovascular:      Rate and Rhythm: Normal rate.      Pulses: Normal pulses.      Heart sounds: No murmur heard.  Pulmonary:      Effort: Pulmonary effort is normal. No respiratory distress.   Abdominal:      General: Abdomen is flat. Bowel sounds are normal. There is no distension.   Skin:     General: Skin is warm and dry.   Neurological:      Mental Status: She is alert.         Computed MELD 3.0 unavailable. One or more values for this score either were not found within the given timeframe or did not fit some other criterion.  Computed MELD-Na unavailable. One or more values for this score either were not found within the given timeframe or did not fit some other criterion.      Significant Labs:  CBC:  Recent Labs   Lab 02/26/25  0811   WBC 4.41   HGB 10.9*   HCT 35.9*        CMP:  Recent Labs   Lab 02/26/25  0811  "     K 4.5      CO2 22*   GLU 77   BUN 15   CREATININE 1.1   CALCIUM 9.1   PROT 6.5   ALBUMIN 3.3*   BILITOT 0.5   ALKPHOS 57   AST 24   ALT 6*   ANIONGAP 9     PTINR:  No results for input(s): "INR" in the last 48 hours.    Significant Procedures:   Dobutamine Stress Test with Color Flow: No results found for this or any previous visit.    None    Assessment and Plan     Physical deconditioning  2/25 PT recs SNF. Family (Niece and POA) agreeable to SNF. needs SNF referrals       Asthma-COPD overlap syndrome  - no acute issues  - satting 97% on RA  - duonebs PRN    Essential hypertension  - uncontrolled BP in the setting of questionable med compliance  - home benazepril not on formulary-- will sub w/ lisinopril 20mg daily with first dose tonight given unsure if pt took home meds today  - continue home amlodipine 10mg daily  2/23  SBP 200s.   Chronic, controlled.  Latest blood pressure and vitals reviewed-   Temp:  [98.1 °F (36.7 °C)-100 °F (37.8 °C)]   Pulse:  [58-80]   Resp:  [20-27]   BP: (117-169)/(69-83)   SpO2:  [95 %-100 %] .   Home meds for hypertension were reviewed and noted below. Hospital anti-hypertensive changes were made as shown below.  Hypertension Medications                         Hydralazine - PRN meds if BP> 170/110 mm HG     2/25 hydralazine decreased to 10mg QID. amlodipine 10mg switched to HSdiscussed with POA Miss Bailey ([phone]) needs SNF referrals   2/26 orthostatic hypotension today. amlodipine discontinued. Gentle LR hydration. hydralazine 10mg q8h PRN for SBP > 160mm HG. pending SNF    BP better off amlodipine. Continue with prn meds      VTE Risk Mitigation (From admission, onward)           Ordered     heparin (porcine) injection 5,000 Units  Every 12 hours         02/22/25 1913     IP VTE HIGH RISK PATIENT  Once         02/22/25 1902     IP VTE HIGH RISK PATIENT  Once         02/22/25 1902                    Discharge Planning   DOTTIE: 2/27/2025     Code Status: Full " Code   Medical Readiness for Discharge Date: 2/27/2025  Discharge Plan A: Skilled Nursing Facility   Discharge Delays: None known at this time              Yaya Cast DO  Department of Hospital Medicine   [facility]

## 2025-02-27 NOTE — SUBJECTIVE & OBJECTIVE
Interval History: NAEO, was able to get out of bed without dizziness. Feeling better on exam today. Stable for discharge at this time.     Review of Systems   Constitutional:  Negative for fatigue and fever.   Respiratory:  Negative for cough and shortness of breath.    Cardiovascular:  Negative for chest pain.   Gastrointestinal:  Negative for abdominal pain, diarrhea, nausea and vomiting.     Objective:     Vital Signs (Most Recent):  Temp: 99.1 °F (37.3 °C) (02/27/25 1214)  Pulse: 66 (02/27/25 1214)  Resp: 20 (02/27/25 1214)  BP: (!) 169/82 (02/27/25 1214)  SpO2: 100 % (02/27/25 1214) Vital Signs (24h Range):  Temp:  [98.1 °F (36.7 °C)-100 °F (37.8 °C)] 99.1 °F (37.3 °C)  Pulse:  [58-80] 66  Resp:  [20-27] 20  SpO2:  [95 %-100 %] 100 %  BP: (117-169)/(69-83) 169/82     Weight: 46 kg (101 lb 6.6 oz)  Body mass index is 17.41 kg/m².    Intake/Output Summary (Last 24 hours) at 2/27/2025 1456  Last data filed at 2/27/2025 0416  Gross per 24 hour   Intake 420 ml   Output 450 ml   Net -30 ml      Physical Exam  Constitutional:       Appearance: Normal appearance.   HENT:      Head: Normocephalic and atraumatic.   Cardiovascular:      Rate and Rhythm: Normal rate.      Pulses: Normal pulses.      Heart sounds: No murmur heard.  Pulmonary:      Effort: Pulmonary effort is normal. No respiratory distress.   Abdominal:      General: Abdomen is flat. Bowel sounds are normal. There is no distension.   Skin:     General: Skin is warm and dry.   Neurological:      Mental Status: She is alert.         Computed MELD 3.0 unavailable. One or more values for this score either were not found within the given timeframe or did not fit some other criterion.  Computed MELD-Na unavailable. One or more values for this score either were not found within the given timeframe or did not fit some other criterion.      Significant Labs:  CBC:  Recent Labs   Lab 02/26/25  0811   WBC 4.41   HGB 10.9*   HCT 35.9*        CMP:  Recent Labs  Patient discharged with v/s stable. Written and verbal after care instructions 
given and explained. 

Patient alert, oriented and verbalized understanding of instructions. 
Ambulatory with steady gait. All questions addressed prior to discharge. ID 
band removed. Patient advised to follow up with PMD. Rx of Clindamycin 300mg 
given. Patient educated on indication of medication including possible reaction 
and side effects. Opportunity to ask questions provided and answered. "  Lab 02/26/25  0811      K 4.5      CO2 22*   GLU 77   BUN 15   CREATININE 1.1   CALCIUM 9.1   PROT 6.5   ALBUMIN 3.3*   BILITOT 0.5   ALKPHOS 57   AST 24   ALT 6*   ANIONGAP 9     PTINR:  No results for input(s): "INR" in the last 48 hours.    Significant Procedures:   Dobutamine Stress Test with Color Flow: No results found for this or any previous visit.    None  "

## 2025-02-27 NOTE — PLAN OF CARE
NURSING HOME ORDERS    02/27/2025  [facility]  [facility]  [id number]  [place] [place] [facility] [place] [address]  Dept: [phone]  Loc: [phone]     Admit to Nursing Home:  Skilled Bed    Diagnoses:  Active Hospital Problems    Diagnosis  POA    Physical deconditioning [R53.81]  Yes    Asthma-COPD overlap syndrome [J44.89]  Yes    Essential hypertension [I10]  Yes     Chronic      Resolved Hospital Problems    Diagnosis Date Resolved POA    *Syncope [R55] 02/27/2025 Yes    Orthostatic hypotension [I95.1] 02/27/2025 Yes    Anemia [D64.9] 02/27/2025 Yes    Hypokalemia [E87.6] 02/27/2025 Yes    Hypomagnesemia [E83.42] 02/27/2025 Yes    Acute cystitis without hematuria [N30.00] 02/27/2025 Yes    Hypocalcemia [E83.51] 02/27/2025 Yes    ROD (acute kidney injury) [N17.9] 02/27/2025 Yes       Patient is homebound due to:  Syncope, debility    Allergies:Review of patient's allergies indicates:  No Known Allergies    Vitals:  Routine    Diet: 2 gram sodium diet and Soft & Bite Sized (IDDSI Level 6)     Activities:   Activity as tolerated    Goals of Care Treatment Preferences:  Code Status: Full Code      Labs:  Per facility protocol    Nursing Precautions:  Fall and Pressure ulcer prevention    Consults:     PT to evaluate and treat  OT to evaluate and treat  Nutrition to evaluate and recommend diet     Miscellaneous Care: Routine Skin for Bedridden Patients:  Apply moisture barrier cream to all                   Diabetes Care:  na      Medications: Discontinue all previous medication orders, if any. See new list below.     Medication List        START taking these medications      hydrALAZINE 10 MG tablet  Commonly known as: APRESOLINE  Take 1 tablet (10 mg total) by mouth every 8 (eight) hours as needed (SBP > 160 mm HG).            CONTINUE taking these medications      albuterol 90 mcg/actuation inhaler  Commonly known as: PROVENTIL/VENTOLIN HFA  Inhale 2 puffs into the lungs  every 6 (six) hours as needed. Rescue     albuterol-ipratropium 2.5 mg-0.5 mg/3 mL nebulizer solution  Commonly known as: DUO-NEB  use 1 vial by nebulization every 4 (four) hours as needed for Wheezing or Shortness of Breath. Rescue     ARICEPT 5 mg tablet  Generic drug: donepeziL  Take 5 mg by mouth every evening.     atorvastatin 10 MG tablet  Commonly known as: LIPITOR  Take 10 mg by mouth once daily.     BREZTRI AEROSPHERE 160-9-4.8 mcg/actuation [facility]  Generic drug: budesonide-glycopyr-formoterol  Inhale 2 puffs into the lungs 2 (two) times a day.            STOP taking these medications      amlodipine-benazepril 10-20mg 10-20 mg per capsule  Commonly known as: LOTREL     benzonatate 100 MG capsule  Commonly known as: TESSALON     doxycycline 100 MG capsule  Commonly known as: MONODOX     promethazine 6.25 mg/5 mL syrup  Commonly known as: PHENERGAN                Immunizations Administered as of 2/27/2025       No immunizations on file.              _________________________________  Yaya Cast,   02/27/2025

## 2025-02-27 NOTE — PT/OT/SLP PROGRESS
Occupational Therapy   Treatment    Name: Gloria Garcia  MRN: [id number]  Admitting Diagnosis:  Syncope       Recommendations:     Discharge Recommendations: Moderate Intensity Therapy  Discharge Equipment Recommendations:  shower chair  Barriers to discharge:  None    Assessment:     Gloria Garcia is a 89 y.o. female with a medical diagnosis of Syncope.  She presents with improved tolerance to OOB activities and reduced assistance required for ADLs compared to previous sessions. Deficits continue to be present and impact pt's independence with ADLs, ADL t/fs, and functional mobility. Performance deficits affecting function are weakness, impaired balance, impaired cardiopulmonary response to activity, gait instability, decreased safety awareness, impaired functional mobility, decreased lower extremity function, decreased upper extremity function, impaired self care skills.     Rehab Prognosis:  Good; patient would benefit from acute skilled OT services to address these deficits and reach maximum level of function.       Plan:     Patient to be seen 4 x/week to address the above listed problems via self-care/home management, community/work re-entry, therapeutic activities, therapeutic exercises, neuromuscular re-education  Plan of Care Expires:    Plan of Care Reviewed with: patient    Subjective     Chief Complaint: none stated  Patient/Family Comments/goals: Return home  Pain/Comfort:   Pain Rating Post-Intervention 1: 0/10    Objective:     Communicated with: RN prior to session.  Patient found supine with blood pressure cuff, telemetry, pulse ox (continuous), PureWick upon OT entry to room.    General Precautions: Standard, fall    Orthopedic Precautions:N/A  Braces: N/A  Respiratory Status: Room air     Occupational Performance:     Bed Mobility:    Patient completed Supine to Sit with contact guard assistance     Functional Mobility/Transfers:  Patient completed Sit <> Stand Transfer with contact guard  assistance and minimum assistance  with  hand-held assist and rolling walker   Requires min A during HHA, when using 2ww progresses to CGA   Functional Mobility: Pt with good dynamic and static sitting balance at EOB. Pt required CGA for standing balance with 2ww. Pt completed in room fxl mobility in room (~20' x2) with standing rest break during grooming activities.    Activities of Daily Living:  Grooming: contact guard assistance for oral care while standing at sink with 2ww  Lower Body Dressing: contact guard assistance to don b/l socks at EOB both with figure 4 position and distal reach       [facility] 6 Click ADL: 19    Treatment & Education:  VSS though increased WOB noted during standing grooming activity. Due to likely event of orthostatic hypotension in previous session, ace wraps donned to BLE to assist with BP maintenance and BP closely monitored. BP as follows: supine 130/87, sitting /72, standing at /66, sitting in chair after functional mobility 170/58, sitting in chair after x2 minutes and with ace wraps removed 142/76. Pt with good tolerance to activity and will continue to benefit from skilled acute OT. Pt educated on: OT POC, OT Role, safety with OOB mobility, and use of DME.    Patient left up in chair with all lines intact, call button in reach, and RN notified    GOALS:   Multidisciplinary Problems       Occupational Therapy Goals          Problem: Occupational Therapy    Goal Priority Disciplines Outcome Interventions   Occupational Therapy Goal     OT, PT/OT Progressing    Description: Goals to be met by: 3/26/25     Patient will increase functional independence with ADLs by performing:    UE Dressing with [place].  LE Dressing with Modified [place].  Grooming while standing at sink with Modified [place].  Toileting from toilet with Modified [place] for hygiene and clothing management.   Toilet transfer to toilet with Modified [place].                    Multidisciplinary Problems (Resolved)          Problem: Occupational Therapy    Goal Priority Disciplines Outcome Interventions   Occupational Therapy Goal   (Resolved)     OT, PT/OT Met                        Time Tracking:     OT Date of Treatment: 02/27/25  OT Start Time: 1000  OT Stop Time: 1038  OT Total Time (min): 38 min    Billable Minutes:Self Care/Home Management 25  Therapeutic Activity 13    OT/SIL: OT          2/27/2025

## 2025-02-27 NOTE — PLAN OF CARE
Shift Note:     Pt Oriented x3, on RA, tele- NSR. X1 Prn given for SBP >160. Worked with PT/OT today- up to chair and ambulated in room. D/C today to SNF. Transported via wheelchair van. Report called to facility. Bed low and locked. Call light in reach. No concerns voiced at this time.      Problem: Infection  Goal: Absence of Infection Signs and Symptoms  Outcome: Met     Problem: Adult Inpatient Plan of Care  Goal: Plan of Care Review  Outcome: Met  Goal: Patient-Specific Goal (Individualized)  Outcome: Met  Goal: Absence of Hospital-Acquired Illness or Injury  Outcome: Met  Goal: Optimal Comfort and Wellbeing  Outcome: Met  Goal: Readiness for Transition of Care  Outcome: Met     Problem: Fall Injury Risk  Goal: Absence of Fall and Fall-Related Injury  Outcome: Met     Problem: Skin Injury Risk Increased  Goal: Skin Health and Integrity  Outcome: Met     Problem: Acute Kidney Injury/Impairment  Goal: Fluid and Electrolyte Balance  Outcome: Met  Goal: Improved Oral Intake  Outcome: Met  Goal: Effective Renal Function  Outcome: Met     Problem: Pain Acute  Goal: Optimal Pain Control and Function  Outcome: Met

## 2025-02-27 NOTE — PLAN OF CARE
[facility] - Acute Medical Step[facility]  Discharge Reassessment    Primary Care Provider: No, Primary Doctor    Expected Discharge Date: 2/27/2025    Reassessment (most recent)       Discharge Reassessment - 02/27/25 1415          Discharge Reassessment    Assessment Type Discharge Planning Reassessment     Did the patient's condition or plan change since previous assessment? No     Discharge Plan discussed with: POA;Patient     Communicated DOTTIE with patient/caregiver Yes     Discharge Plan A Skilled Nursing Facility     Discharge Plan B Home with family     DME Needed Upon Discharge  other (see comments)     Transition of Care Barriers Mobility     Why the patient remains in the hospital Requires continued medical care        Post-Acute Status    Post-Acute Authorization Placement     Post-Acute Placement Status Set-up Complete/Auth obtained     Hospital Resources/Appts/Education Provided Appointments scheduled and added to AVS     Discharge Delays None known at this time                       Discharge Plan A and Plan B have been determined by review of patient's clinical status, future medical and therapeutic needs, and coverage/benefits for post-acute care in coordination with multidisciplinary team members.    Triny Hughes MSW, CSW

## 2025-02-27 NOTE — PT/OT/SLP PROGRESS
Physical Therapy Treatment    Patient Name:  Gloria Garcia   MRN:  [id number]    Recommendations:     Discharge Recommendations: Moderate Intensity Therapy  Discharge Equipment Recommendations: none  Barriers to discharge: None    Assessment:     Gloria Garcia is a 89 y.o. female admitted with a medical diagnosis of Syncope.  She presents with the following impairments/functional limitations: weakness, impaired endurance, impaired self care skills, impaired functional mobility, decreased safety awareness, impaired cardiopulmonary response to activity Pt tolerated treatment session well today. Pt requiring little to no assistance during bed mobility, transfers and gait training. Pt was able to increase total distance ambulated during today's session. BP monitored throughout and as follows: sitting up in bedside chair: 130/63, standin/63, Supine: 130/70. Pt with mild c/o dizziness throughout. Patient remains appropriate for continued skilled services within the acute environment and goals remain appropriate.   .    Rehab Prognosis: Good; patient would benefit from acute skilled PT services to address these deficits and reach maximum level of function.    Recent Surgery: * No surgery found *      Plan:     During this hospitalization, patient to be seen 3 x/week to address the identified rehab impairments via gait training, therapeutic activities, therapeutic exercises, neuromuscular re-education and progress toward the following goals:    Plan of Care Expires:  25    Subjective     Chief Complaint: None stated   Patient/Family Comments/goals: pt agreeable to PT   Pain/Comfort:  Pain Rating 1: 0/10      Objective:     Communicated with Rn prior to session.  Patient found up in chair with telemetry, pulse ox (continuous), PureWick upon PT entry to room.     General Precautions: Standard, fall  Orthopedic Precautions:    Braces:    Respiratory Status: Room air     Functional Mobility:  Bed Mobility:      Sit to Supine: minimum assistance  Transfers:     Sit to Stand:  minimum assistance with rolling walker  Gait: 4 ft + 15 ft CGA with RW     Pt performed 10 repetitions of seated B LE exercises consisting of: Marching, LAQ, ABD/ADD, heel raises, and toe raises.         AM-PAC 6 CLICK MOBILITY  Turning over in bed (including adjusting bedclothes, sheets and blankets)?: 3  Sitting down on and standing up from a chair with arms (e.g., wheelchair, bedside commode, etc.): 3  Moving from lying on back to sitting on the side of the bed?: 3  Moving to and from a bed to a chair (including a wheelchair)?: 3  Need to walk in hospital room?: 3  Climbing 3-5 steps with a railing?: 3  Basic Mobility Total Score: 18       Treatment & Education:  Therapist provided instruction and educated for safety during transfers and gait training. As well as proper body mechanics, energy conservation, and fall prevention strategies during tasks listed above, and the effects of prolonged immobility and the importance of performing EOB/OOB activity and exercises to promote healing and reduce recovery time.       Patient left supine with all lines intact, call button in reach, and Rn notified..    GOALS:   Multidisciplinary Problems       Physical Therapy Goals          Problem: Physical Therapy    Goal Priority Disciplines Outcome Interventions   Physical Therapy Goal     PT, PT/OT Progressing    Description: Goals to be met by: 3/26/25     Patient will increase functional independence with mobility by performin. Sit to stand transfer with Supervision  2. Bed to chair transfer with Supervision using Rolling Walker  3. Gait  x 150 feet with Supervision using Rolling Walker.     Patient demonstrates a mobility limitation that significantly impairs their ability to participate in one or more mobility related activities of daily living. Patient's mobility limitation cannot be sufficiently resolved with the use of a cane, but can be  sufficiently resolved with the use of a rolling walker.The use of a rolling walker will considerably improve their ability to participate in MRADLs. Patient will use the walker on a regular basis at home.                         DME Justifications:  No DME recommended requiring DME justifications    Time Tracking:     PT Received On: 02/27/25  PT Start Time: 1352     PT Stop Time: 1412  PT Total Time (min): 20 min     Billable Minutes: Gait Training 15    Treatment Type: Treatment  PT/PTA: PTA     Number of PTA visits since last PT visit: 1 02/27/2025

## 2025-02-28 NOTE — PLAN OF CARE
[facility] - Acute Medical Stepdown  Discharge Final Note    Primary Care Provider: Hailey Primary Doctor    Expected Discharge Date: 2/27/2025    Patient discharged to [facility] via  van transportation.     Patient's bedside nurse and pt/MITZI Bailey notified of the above.    Discharge Plan A and Plan B have been determined by review of patient's clinical status, future medical and therapeutic needs, and coverage/benefits for post-acute care in coordination with multidisciplinary team members.        Final Discharge Note (most recent)       Final Note - 02/28/25 0846          Final Note    Assessment Type Final Discharge Note     Anticipated Discharge Disposition Skilled Nursing Facility     What phone number can be called within the next 1-3 days to see how you are doing after discharge? [phone]     Hospital Resources/Appts/Education Provided Appointments scheduled and added to AVS        Post-Acute Status    Post-Acute Authorization Placement     Post-Acute Placement Status Set-up Complete/Auth obtained     Discharge Delays None known at this time                     Important Message from Medicare  Important Message from Medicare regarding Discharge Appeal Rights: Given to patient/caregiver, Explained to patient/caregiver, Signed/date by patient/caregiver     Date IMM was signed: 02/27/25  Time IMM was signed: 0934    Contact Info       No, Primary Doctor   Relationship: PCP - General        Next Steps: Follow up in 1 week(s)            No future appointments.    CHW scheduled post-discharge follow-up appointment and information added to AVS.     Triny Hughes MSW, CSW

## 2025-03-05 NOTE — ASSESSMENT & PLAN NOTE
Compliance is an issue. Discussed inhaler regimen at length and the need to follow it daily.    - Breztri  - PRN neb  - Spacer ordered  - Strongly encouraged oxygen use and discussed risk/benefits  - Encouraged regular, progressive activity  - Declined vaccines

## 2025-03-05 NOTE — ASSESSMENT & PLAN NOTE
- uncontrolled BP in the setting of questionable med compliance  - home benazepril not on formulary-- will sub w/ lisinopril 20mg daily with first dose tonight given unsure if pt took home meds today  - continue home amlodipine 10mg daily  2/23  SBP 200s.   Chronic, controlled.  Latest blood pressure and vitals reviewed-    .   Home meds for hypertension were reviewed and noted below. Hospital anti-hypertensive changes were made as shown below.  Hypertension Medications                         Hydralazine - PRN meds if BP> 170/110 mm HG     2/25 hydralazine decreased to 10mg QID. amlodipine 10mg switched to HSdiscussed with POA Miss Bailey ([phone]) needs SNF referrals   2/26 orthostatic hypotension today. amlodipine discontinued. Gentle LR hydration. hydralazine 10mg q8h PRN for SBP > 160mm HG. pending SNF    BP better off amlodipine. Continue with prn meds

## 2025-03-05 NOTE — DISCHARGE SUMMARY
[facility] Medicine  Discharge Summary      Patient Name: Gloria Garcia  MRN: [id number]  SABI: [id number]  Patient Class: IP- Inpatient  Admission Date: 2/22/2025  Hospital Length of Stay: 5 days  Discharge Date and Time:  2/27/2025   Attending Physician: Hailey att. providers found   Discharging Provider: Yaya Cast DO  Primary Care Provider: Hailey Primary Doctor  [facility] Medicine Team: [facility] HOSP MED A Yaya Cast DO  Primary Care Team: [facility] MED A    HPI:   Gloria Garcia is a 89 y.o. female with a PMHx of HTN, dementia, COPD, constipation and asthma who presents to [facility] for evaluation of syncope. Patient is a poor historian. She reports suddenly passing out while sitting down watching TV earlier today. She's unsure how long she was out for. Denies any fall or head trauma, though unreliable historian. She complained of dizziness and weakness to ED provider, however she denies these symptoms at the time of my exam. Denies any preceding lightheadedness, chest pain, or SOB earlier today. She's unsure if she took her home meds this AM. She is otherwise feeling well without any complaints of chest pain, LE swelling, headache, weakness, or SOB.    ED: hypertensive to /95, vitals otherwise stable. No leukocytosis. K 3.0, Mag 1.3, Ca 6.0 (corrected 7.3). UA infectious. Given 500cc IVFs, IV Ca gluconate, IV mag, and PO klyte    * No surgery found *      Hospital Course:   2/23 AAOX2. denies symptoms. Syncope - likely due to dehydration and electrolyte abnormalities. CTHead -  No acute intracranial abnormality detected.  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 CXR WNL , UA+, UC pending. continue IV ceftriaxone.  s/p 500cc IVFs.  echo ordered. neuro checks q4hr. calcium improved to 8.8  after given 1g IV Ca gluconate in the ED. SBP 200s improved to 140s Cr 0.7--> 1. monitor. CPK levels WNL . continue telemetry .   2/24 Lisinopril  discontinued. renal sonogram - Bilateral medical renal disease.  No hydronephrosis. Simple left renal cyst. started on hydralazine 25mg q 12h  for elevated BP. UC - Multiple organisms isolated. None in predominance.  Ceftriaxone discontinued. DVT sonogram - No evidence of deep venous thrombosis in either lower extremity and D dimer 0.73. Echo -  Left Ventricle: The left ventricle is normal in size. Ventricular mass is normal. Normal wall thickness. Regional wall motion abnormalities present. See diagram for wall motion findings. Septal motion is abnormal. There is normal systolic function with a visually estimated ejection fraction of 60 - 65%. Ejection fraction is approximately 63%. Quantitated ejection fraction is 64%. There is indeterminate diastolic function.    Right Ventricle: Normal right ventricular cavity size. Wall thickness is normal. Systolic function is normal.    Left Atrium: Normal left atrial size.    Right Atrium: Normal right atrial size.    Aortic Valve: There is mild aortic valve sclerosis. Aortic valve peak velocity is 1.5 m/s. Mean gradient is 4 mmHg.    Mitral Valve: There is bileaflet sclerosis. There is normal leaflet mobility.    Tricuspid Valve: The tricuspid valve is structurally normal. There is mild regurgitation.    Pulmonic Valve: The pulmonic valve is structurally normal. There is mild regurgitation.    Pulmonary Artery: There is mild to moderate pulmonary hypertension. The estimated pulmonary artery systolic pressure is 49 mmHg.    IVC/SVC: Normal venous pressure at 3 mmHg.    Pericardium: There is no pericardial effusion. orthostatics negative.   PT/OT consulted - recs SNF.   2/25 hydralazine decreased to 10mg QID. amlodipine 10mg switched to HS. EKG with sinus arrhythmia. Cr improved to 1.2. discussed with POFRANCO Miss Bailey ([phone]) needs SNF referrals   2/26 orthostatic hypotension today. amlodipine discontinued. Gentle LR hydration. hydralazine 10mg q8h PRN for SBP > 160mm HG.  pending SNF    Continue to hold amlodipine, hydralazine prn ordered for elevated BP. Would be okay with higher BP while supine to avoid orthostatics    Patient stable for discharge to SNF, discharge in stable condition       Goals of Care Treatment Preferences:  Code Status: Full Code      SDOH Screening:  The patient was screened for utility difficulties, food insecurity, transport difficulties, housing insecurity, and interpersonal safety and there were no concerns identified this admission.     Consults:     Assessment & Plan  Essential hypertension  - uncontrolled BP in the setting of questionable med compliance  - home benazepril not on formulary-- will sub w/ lisinopril 20mg daily with first dose tonight given unsure if pt took home meds today  - continue home amlodipine 10mg daily  2/23  SBP 200s.   Chronic, controlled.  Latest blood pressure and vitals reviewed-    .   Home meds for hypertension were reviewed and noted below. Hospital anti-hypertensive changes were made as shown below.  Hypertension Medications                         Hydralazine - PRN meds if BP> 170/110 mm HG     2/25 hydralazine decreased to 10mg QID. amlodipine 10mg switched to HSdiscussed with POA Miss Bailey ([phone]) needs SNF referrals   2/26 orthostatic hypotension today. amlodipine discontinued. Gentle LR hydration. hydralazine 10mg q8h PRN for SBP > 160mm HG. pending SNF    BP better off amlodipine. Continue with prn meds  Asthma-COPD overlap syndrome  - no acute issues  - satting 97% on RA  - duonebs PRN  Physical deconditioning  2/25 PT recs SNF. Family (Niece and POA) agreeable to SNF. needs SNF referrals     Final Active Diagnoses:    Diagnosis Date Noted POA    Physical deconditioning [R53.81] 02/25/2025 Yes    Asthma-COPD overlap syndrome [J44.89] 11/20/2024 Yes    Essential hypertension [I10] 03/28/2020 Yes     Chronic      Problems Resolved During this Admission:    Diagnosis Date Noted Date Resolved POA    PRINCIPAL  PROBLEM:  Syncope [R55] 02/22/2025 02/27/2025 Yes    Orthostatic hypotension [I95.1] 02/26/2025 02/27/2025 Yes    Anemia [D64.9] 02/23/2025 02/27/2025 Yes    Hypokalemia [E87.6] 02/22/2025 02/27/2025 Yes    Hypomagnesemia [E83.42] 02/22/2025 02/27/2025 Yes    Acute cystitis without hematuria [N30.00] 02/22/2025 02/27/2025 Yes    Hypocalcemia [E83.51] 02/22/2025 02/27/2025 Yes    ROD (acute kidney injury) [N17.9] 03/28/2020 02/27/2025 Yes       Discharged Condition: good    Disposition: Skilled Nursing Facility    Follow Up:   Follow-up Information       No, Primary Doctor Follow up in 1 week(s).                           Patient Instructions:   No discharge procedures on file.    Significant Diagnostic Studies: N/A    Pending Diagnostic Studies:       None           Medications:  Reconciled Home Medications:      Medication List        START taking these medications      hydrALAZINE 10 MG tablet  Commonly known as: APRESOLINE  Take 1 tablet (10 mg total) by mouth every 8 (eight) hours as needed (SBP > 160 mm HG).            CONTINUE taking these medications      albuterol 90 mcg/actuation inhaler  Commonly known as: PROVENTIL/VENTOLIN HFA  Inhale 2 puffs into the lungs every 6 (six) hours as needed. Rescue     albuterol-ipratropium 2.5 mg-0.5 mg/3 mL nebulizer solution  Commonly known as: DUO-NEB  use 1 vial by nebulization every 4 (four) hours as needed for Wheezing or Shortness of Breath. Rescue     ARICEPT 5 mg tablet  Generic drug: donepeziL  Take 5 mg by mouth every evening.     atorvastatin 10 MG tablet  Commonly known as: LIPITOR  Take 10 mg by mouth once daily.     BREZTRI AEROSPHERE 160-9-4.8 mcg/actuation Hfaa  Generic drug: budesonide-glycopyr-formoterol  Inhale 2 puffs into the lungs 2 (two) times a day.            STOP taking these medications      amlodipine-benazepril 10-20mg 10-20 mg per capsule  Commonly known as: LOTREL     benzonatate 100 MG capsule  Commonly known as: TESSALON     doxycycline 100  MG capsule  Commonly known as: MONODOX     promethazine 6.25 mg/5 mL syrup  Commonly known as: PHENERGAN              Indwelling Lines/Drains at time of discharge:   Lines/Drains/Airways       None                   Time spent on the discharge of patient: 47 minutes         Yaya Cast DO  Department of Hospital Medicine  [facility] - Acute Medical Stepdown

## 2025-03-19 ENCOUNTER — TELEPHONE (OUTPATIENT)
Dept: PULMONOLOGY | Facility: CLINIC | Age: 89
End: 2025-03-19
Payer: MEDICARE

## 2025-03-19 NOTE — TELEPHONE ENCOUNTER
----- Message from Sylvia sent at 3/19/2025  2:56 PM CDT -----  Consult/AdvisoryName Of Caller:Briseyda Contact Preference:[phone] of call: Briseyda called regarding a appt the [facility] that was avail she wasn't to take that appt please call Briseyda to assist

## 2025-03-19 NOTE — TELEPHONE ENCOUNTER
Left message on Ms Rain voicemail, informing her that I have received her message. I also advised Ms Rain that if she has any questions or concerns, she may contact the office.

## 2025-03-19 NOTE — TELEPHONE ENCOUNTER
----- Message from Kusum sent at 3/19/2025 12:22 PM CDT -----  Regarding: Appt  Contact: Briseyda [phone] or [phone]  Briseyda/ [facility] is calling to schedule a appt with provider states pt needs portable oxygen; states pt has COPD and shortness of breath please call

## 2025-03-19 NOTE — TELEPHONE ENCOUNTER
Spoke with Ms Rain, in regards to her message. I also advised Ms Rain that I can schedule pt appointment with Dr Juares on 3/26/25 for 9:00 am. Ms Rain verbalized that she understand and accepted pt appointment.

## 2025-03-25 NOTE — PROGRESS NOTES
Subjective:       Patient ID: Gloria Garcia is a 89 y.o. female.    Chief Complaint: No chief complaint on file.    HPI:   Gloria Garcia is a 89 y.o. female last seen by me 25 who presents in close f/u for asthma/copd and chronic hypoxic RF.     Today:  Hospitalized after her last visit after an episode of syncope and here as a hospital f/u.    Here w/ her daughter. Asking for an updated oxygen prescription for a concentrator as the tanks are prohibitive.    Denies new respiratory complaints. Daughter states she is using her inhaler. Denies worsening cough or sputum production.    25 HPI:  7 presentations to the ED for dyspnea in the last year based on available chart review.    History is limited. Presents w/ a niece who is not clear about her history. She attempted to call the patient's sister for clarification, but did not get a response. Patient occ answer questions.    Was given oxygen but does not use it. Niece reports there was a c/f safety and they are asking to have it taken away.    Reportedly dx w/ asthma as child.     Uses breztri but unclear how much.     Exposures:  Tobacco- Quit in . 2 ppd when she was smoking  Inhalational Agents- Denies  Mold- Denies    Family History of Lung Disease: Denies  Childhood History of Lung Disease: Denies    Review of Systems    As above    Social History     Tobacco Use    Smoking status: Former     Current packs/day: 0.00     Types: Cigarettes     Quit date: 3/28/1995     Years since quittin.0    Smokeless tobacco: Never   Substance Use Topics    Alcohol use: Yes     Comment: occasional/social       Review of patient's allergies indicates:  No Known Allergies  Past Medical History:   Diagnosis Date    Asthma     COPD exacerbation 2024    Hypertension      Past Surgical History:   Procedure Laterality Date    HYSTERECTOMY       Current Outpatient Medications on File Prior to Visit   Medication Sig    acetaminophen (TYLENOL) 325 MG tablet  "Take 650 mg by mouth.    ADVAIR -21 mcg/actuation HFAA inhaler Inhale 2 puffs into the lungs 2 (two) times daily.    albuterol (PROVENTIL/VENTOLIN HFA) 90 mcg/actuation inhaler Inhale 2 puffs into the lungs every 6 (six) hours as needed. Rescue    albuterol-ipratropium (DUO-NEB) 2.5 mg-0.5 mg/3 mL nebulizer solution use 1 vial by nebulization every 4 (four) hours as needed for Wheezing or Shortness of Breath. Rescue    amLODIPine (NORVASC) 5 MG tablet Take 5 mg by mouth.    ARICEPT 5 mg tablet Take 5 mg by mouth every evening.    atorvastatin (LIPITOR) 10 MG tablet Take 10 mg by mouth once daily.    budesonide-glycopyr-formoterol (BREZTRI AEROSPHERE) 160-9-4.8 mcg/actuation HFAA Inhale 2 puffs into the lungs 2 (two) times a day.    hydrALAZINE (APRESOLINE) 10 MG tablet Take 1 tablet (10 mg total) by mouth every 8 (eight) hours as needed (SBP > 160 mm HG).    SYMBICORT 160-4.5 mcg/actuation HFAA Inhale 2 puffs into the lungs 2 (two) times daily.     No current facility-administered medications on file prior to visit.       Objective:      Vitals:    03/26/25 0915   BP: 136/62   BP Location: Right arm   Patient Position: Sitting   Pulse: 68   SpO2: (!) 92%   Weight: 40.4 kg (89 lb 1.1 oz)   Height: 5' 4" (1.626 m)       Physical Exam   Neck: No tracheal deviation present.   Cardiovascular: Normal rate and regular rhythm.   Pulmonary/Chest: Normal expansion, symmetric chest wall expansion, effort normal and breath sounds normal. No respiratory distress. She has no wheezes. She has no rhonchi. She has no rales.   Abdominal: She exhibits no distension.   Musculoskeletal:         General: No edema.      Cervical back: Neck supple.   Lymphadenopathy: No supraclavicular adenopathy is present.     She has no cervical adenopathy.   Skin: Skin is warm and dry. No cyanosis or erythema. Nails show no clubbing.   Nursing note and vitals reviewed.      Personal Diagnostic Review    Laboratory:  11/20/24  VBG- " "7.43/45.4/28  Bicarb- 21  Eosinophils- 0.0      CXR 2/24/25 Images personally reviewed. I agree w/ the radiologist who notes  Chronic and atelectatic appearing change, there is no radiographic evidence for superimposed acute intrathoracic process.       CXR 11/20/24- Images personally reviewed. I agree w/ the radiologist who notes;  No acute process    03/26/2025---------Distance: 30.48 meters (100 feet)       O2 Sat % Supplemental Oxygen Heart Rate Blood Pressure Morteza Scale   Pre-exercise  (Resting) 88 % Room Air 58 bpm 185/95 mmHg 0   Pre-exercise  (Resting) 98 % 2 L/M 50 bpm 174/71 mmHg 0   During Exercise 97 % 2 L/M 87 bpm 157/77 mmHg 0   Post-exercise    97 % 2 L/M  80 bpm          Recovery Time: 40 seconds     Oxygen Qualification:       O2 Sat % Supplemental Oxygen Heart Rate Blood Pressure Morteza Scale   Pre-exercise  (Resting) 88 % Room Air  58 bpm  185/95 mmHg 0       Performing nurse/tech: Leyda MERCADO    6 MWT 11/11/24  Pulse Oximetry: 88  % SpO2 on room air at rest on 11/11/24.     "Pulse Oximetry:  88% SpO2 on room air at rest on 11/11/24.                             86% SpO2 on room air with activity/exercise on 11/11/24.                        92%  SpO2 on 2 liters nasal cannula oxgyen with activity/excercise on 11/11/24.      Assessment:     1. Chronic obstructive pulmonary disease, unspecified COPD type  -     Stress test, pulmonary; Future  -     OXYGEN FOR HOME USE    2. Asthma-COPD overlap syndrome  Assessment & Plan:  Compliance is an issue. Discussed inhaler regimen at length and the need to follow it daily.  - Breztri  - PRN neb  - Spacer ordered  - Strongly encouraged oxygen use and discussed risk/benefits  - Encouraged regular, progressive activity  - Declined vaccines      3. Chronic hypoxic respiratory failure, on home oxygen therapy  Assessment & Plan:  As above             30 minutes of total time spent on the encounter, which includes face to face time and non-face to face time preparing " to see the patient (eg, review of tests), Obtaining and/or reviewing separately obtained history, Documenting clinical information in the electronic or other health record, Independently interpreting results (not separately reported) and communicating results to the patient/family/caregiver, or Care coordination (not separately reported).

## 2025-03-26 ENCOUNTER — OFFICE VISIT (OUTPATIENT)
Dept: OPTOMETRY | Facility: CLINIC | Age: 89
End: 2025-03-26
Payer: MEDICARE

## 2025-03-26 ENCOUNTER — HOSPITAL ENCOUNTER (OUTPATIENT)
Dept: PULMONOLOGY | Facility: CLINIC | Age: 89
Discharge: HOME | End: 2025-03-26
Payer: MEDICARE

## 2025-03-26 ENCOUNTER — OFFICE VISIT (OUTPATIENT)
Dept: PULMONOLOGY | Facility: CLINIC | Age: 89
End: 2025-03-26
Payer: MEDICARE

## 2025-03-26 VITALS
HEART RATE: 68 BPM | SYSTOLIC BLOOD PRESSURE: 136 MMHG | HEIGHT: 64 IN | WEIGHT: 89.06 LBS | DIASTOLIC BLOOD PRESSURE: 62 MMHG | BODY MASS INDEX: 15.21 KG/M2 | OXYGEN SATURATION: 92 %

## 2025-03-26 VITALS — HEIGHT: 64 IN | WEIGHT: 89.06 LBS | BODY MASS INDEX: 15.21 KG/M2

## 2025-03-26 DIAGNOSIS — Z99.81 CHRONIC HYPOXIC RESPIRATORY FAILURE, ON HOME OXYGEN THERAPY: ICD-10-CM

## 2025-03-26 DIAGNOSIS — H40.053 RAISED INTRAOCULAR PRESSURE OF BOTH EYES: ICD-10-CM

## 2025-03-26 DIAGNOSIS — J44.9 CHRONIC OBSTRUCTIVE PULMONARY DISEASE, UNSPECIFIED COPD TYPE: ICD-10-CM

## 2025-03-26 DIAGNOSIS — J44.89 ASTHMA-COPD OVERLAP SYNDROME: ICD-10-CM

## 2025-03-26 DIAGNOSIS — J96.11 CHRONIC HYPOXIC RESPIRATORY FAILURE, ON HOME OXYGEN THERAPY: ICD-10-CM

## 2025-03-26 DIAGNOSIS — H25.13 NUCLEAR SCLEROSIS OF BOTH EYES: Primary | ICD-10-CM

## 2025-03-26 DIAGNOSIS — H26.9 CORTICAL CATARACT OF BOTH EYES: ICD-10-CM

## 2025-03-26 DIAGNOSIS — J44.9 CHRONIC OBSTRUCTIVE PULMONARY DISEASE, UNSPECIFIED COPD TYPE: Primary | ICD-10-CM

## 2025-03-26 PROCEDURE — 99999 PR PBB SHADOW E&M-EST. PATIENT-LVL III: CPT | Mod: PBBFAC,,, | Performed by: INTERNAL MEDICINE

## 2025-03-26 PROCEDURE — 99204 OFFICE O/P NEW MOD 45 MIN: CPT | Mod: S$PBB,,, | Performed by: OPTOMETRIST

## 2025-03-26 PROCEDURE — 99999 PR PBB SHADOW E&M-EST. PATIENT-LVL III: CPT | Mod: PBBFAC,,, | Performed by: OPTOMETRIST

## 2025-03-26 PROCEDURE — 94618 PULMONARY STRESS TESTING: CPT | Mod: PBBFAC | Performed by: INTERNAL MEDICINE

## 2025-03-26 PROCEDURE — 99213 OFFICE O/P EST LOW 20 MIN: CPT | Mod: PBBFAC,PO | Performed by: OPTOMETRIST

## 2025-03-26 PROCEDURE — 99213 OFFICE O/P EST LOW 20 MIN: CPT | Mod: PBBFAC,25,27 | Performed by: INTERNAL MEDICINE

## 2025-03-26 RX ORDER — BUDESONIDE AND FORMOTEROL FUMARATE DIHYDRATE 160; 4.5 UG/1; UG/1
2 AEROSOL RESPIRATORY (INHALATION) 2 TIMES DAILY
COMMUNITY
Start: 2024-12-21

## 2025-03-26 RX ORDER — ACETAMINOPHEN 325 MG/1
650 TABLET ORAL
COMMUNITY
Start: 2025-03-03

## 2025-03-26 RX ORDER — FLUTICASONE PROPIONATE AND SALMETEROL XINAFOATE 230; 21 UG/1; UG/1
2 AEROSOL, METERED RESPIRATORY (INHALATION) 2 TIMES DAILY
COMMUNITY
Start: 2025-03-05

## 2025-03-26 RX ORDER — AMLODIPINE BESYLATE 5 MG/1
5 TABLET ORAL
COMMUNITY
Start: 2025-03-12

## 2025-03-26 NOTE — PROGRESS NOTES
Subjective:       Patient ID: Gloria Garcia is a 89 y.o. female      Chief Complaint   Patient presents with    Concerns About Ocular Health    Hypertensive Eye Exam     History of Present Illness  Dls: ? Yrs     89 y/o female presents today for ocular health check.  Pt c/o blurry vision at distance and near ou off/on   Pt does not wear any glasses.     + ou  tearing  No itching  No burning  + ou off/on pain  + ha's  + ou off/on  floaters  No flashes    Eye meds  None    Pohx:   None    Fohx:   None         Assessment/Plan:     1. Nuclear sclerosis of both eyes (Primary)  2. Cortical cataract of both eyes  Visually significant cataract. Discussed diagnosis with patient and surgical process. Referral for cataract evaluation.     - Ambulatory referral/consult to Ophthalmology    3. Raised intraocular pressure of both eyes   Elevated IOP on tonometry, pt unable to complete goldmann. Optic nerves appear healthy. Recheck IOP at cataract evaluation.      Follow up for cataract consult.

## 2025-03-26 NOTE — PROCEDURES
Gloria Garcia is a 89 y.o.   female patient, who presents for a 6 minute walk test ordered by MD Blanquita.  The diagnosis is Qualify for Oxygen; COPD/Asthma; Dyspnea on Exertion.  The patient's BMI is 15.3 kg/m2. Predicted distance (lower limit of normal) is 269.49 meters.    Test Results:    The test was not completed.  The patient stopped 1 time for a total of 306 seconds.  The total time walked was 54 seconds.  During walking, the patient reported:  No complaints.  The patient used supplemental oxygen during testing.     03/26/2025---------Distance: 30.48 meters (100 feet)     O2 Sat % Supplemental Oxygen Heart Rate Blood Pressure Morteza Scale   Pre-exercise  (Resting) 88 % Room Air 58 bpm 185/95 mmHg 0   Pre-exercise  (Resting) 98 % 2 L/M 50 bpm 174/71 mmHg 0   During Exercise 97 % 2 L/M 87 bpm 157/77 mmHg 0   Post-exercise   97 % 2 L/M  80 bpm       Recovery Time: 40 seconds    Oxygen Qualification:     O2 Sat % Supplemental Oxygen Heart Rate Blood Pressure Morteza Scale   Pre-exercise  (Resting) 88 % Room Air  58 bpm  185/95 mmHg 0      Performing nurse/tech: Leyda MERCADO      PREVIOUS STUDY:   The patient has not had a previous study.      CLINICAL INTERPRETATION:  Six minute walk distance is 30.48 meters (100 feet) with no dyspnea.  At rest, there was significant desaturation while breathing room air.  During exercise, there was no significant desaturation while breathing supplemental oxygen.  Blood pressure remained stable and Heart rate increased significantly with walking.  Bradycardia and Hypertension were present prior to exercise.  The patient did not report non-pulmonary symptoms during exercise.  Severe exercise impairment is likely due to desaturation, cardiovascular causes, and subjective symptoms.  The patient did not complete the study, walking 54 seconds of the 360 second test.  The patient may benefit from using supplemental oxygen.  No previous study performed.  Based upon age and body mass index,  exercise capacity is less than predicted.   Oxygen saturation did improve while breathing supplemental oxygen.

## 2025-04-21 DIAGNOSIS — Z99.81 CHRONIC HYPOXIC RESPIRATORY FAILURE, ON HOME OXYGEN THERAPY: ICD-10-CM

## 2025-04-21 DIAGNOSIS — J96.11 CHRONIC HYPOXIC RESPIRATORY FAILURE, ON HOME OXYGEN THERAPY: ICD-10-CM

## 2025-04-21 DIAGNOSIS — J44.89 ASTHMA-COPD OVERLAP SYNDROME: ICD-10-CM

## 2025-04-21 RX ORDER — IPRATROPIUM BROMIDE AND ALBUTEROL SULFATE 2.5; .5 MG/3ML; MG/3ML
SOLUTION RESPIRATORY (INHALATION)
Qty: 90 ML | Refills: 2 | Status: SHIPPED | OUTPATIENT
Start: 2025-04-21

## 2025-05-15 NOTE — PLAN OF CARE
ALISSA email pt MITZI Bailey [phone] a snf list to review.   POA will call ALISSA back with options.     Will follow up.     Triny Hughes MSW, CSW     No

## 2025-05-23 ENCOUNTER — TELEPHONE (OUTPATIENT)
Dept: OPHTHALMOLOGY | Facility: CLINIC | Age: 89
End: 2025-05-23
Payer: MEDICARE

## 2025-05-23 ENCOUNTER — OFFICE VISIT (OUTPATIENT)
Dept: OPHTHALMOLOGY | Facility: CLINIC | Age: 89
End: 2025-05-23
Payer: MEDICARE

## 2025-05-23 DIAGNOSIS — H25.13 NUCLEAR SCLEROSIS OF BOTH EYES: Primary | ICD-10-CM

## 2025-05-23 DIAGNOSIS — H25.11 NS (NUCLEAR SCLEROSIS), RIGHT: Primary | ICD-10-CM

## 2025-05-23 DIAGNOSIS — H26.9 CORTICAL CATARACT OF BOTH EYES: ICD-10-CM

## 2025-05-23 DIAGNOSIS — H25.12 NS (NUCLEAR SCLEROSIS), LEFT: Primary | ICD-10-CM

## 2025-05-23 DIAGNOSIS — H52.7 REFRACTIVE ERROR: ICD-10-CM

## 2025-05-23 PROCEDURE — 99999 PR PBB SHADOW E&M-EST. PATIENT-LVL I: CPT | Mod: PBBFAC,,, | Performed by: OPHTHALMOLOGY

## 2025-05-23 RX ORDER — PREDNISOLONE/MOXIFLOX/BROMFEN 1 %-0.5 %
1 SUSPENSION, DROPS(FINAL DOSAGE FORM)(ML) OPHTHALMIC (EYE) 3 TIMES DAILY
Qty: 8 ML | Refills: 2 | Status: SHIPPED | OUTPATIENT
Start: 2025-06-21

## 2025-05-23 NOTE — PROGRESS NOTES
Subjective:       Patient ID: Gloria Garcia is a 89 y.o. female.    Chief Complaint: Cataract    HPI    Here for cataract evaluation     Eye meds: none    89 year old female states she has been having blurry/foggy vision for   several years and stopped wearing glasses, because it didn't help. Denies   flashes, floaters or diplopia. Pt sees glare and occasionally had light   sensitivity   Last edited by Lynn Caba on 5/23/2025  9:29 AM.             Assessment:       1. Nuclear sclerosis of both eyes    2. Cortical cataract of both eyes    3. Refractive error        Plan:       Visually significant cataract OU -Pt. Wants Sx.    RE      Cataract Surgery Consent: Patient with a visually significant cataract with difficulties of ADLs, reading, driving, night vision, glare (any and all).  Discussed with Patient/Family/Caregiver: options, risks and benefits, expectations of cataract surgery, utilized an eye model with questions and answers to facilitate discussion.  Discussed lens options and patient understands that glasses may be required for optimal vision for distance and/or near vision after cataract surgery.  The Patient/Family/Caregiver  voice good understanding and patient wishes to proceed with surgery.  The patient will likely benefit from surgery and patient signed consent for Left Eye.  CE OS 6/24/25 CNAOTO 21.5,        OD 7/8/25 CNAOTO 21.5.

## 2025-06-18 ENCOUNTER — TELEPHONE (OUTPATIENT)
Dept: OPHTHALMOLOGY | Facility: CLINIC | Age: 89
End: 2025-06-18
Payer: MEDICARE

## 2025-06-19 ENCOUNTER — TELEPHONE (OUTPATIENT)
Dept: OPHTHALMOLOGY | Facility: CLINIC | Age: 89
End: 2025-06-19
Payer: MEDICARE

## 2025-06-19 DIAGNOSIS — H25.12 NS (NUCLEAR SCLEROSIS), LEFT: Primary | ICD-10-CM

## 2025-06-19 DIAGNOSIS — H25.11 NS (NUCLEAR SCLEROSIS), RIGHT: Primary | ICD-10-CM
